# Patient Record
Sex: MALE | Race: WHITE | Employment: FULL TIME | URBAN - METROPOLITAN AREA
[De-identification: names, ages, dates, MRNs, and addresses within clinical notes are randomized per-mention and may not be internally consistent; named-entity substitution may affect disease eponyms.]

---

## 2017-07-14 ENCOUNTER — HOSPITAL ENCOUNTER (EMERGENCY)
Age: 37
Discharge: HOME | End: 2017-07-14
Attending: EMERGENCY MEDICINE

## 2017-07-14 ENCOUNTER — APPOINTMENT (OUTPATIENT)
Dept: CT IMAGING | Age: 37
End: 2017-07-14

## 2017-07-14 VITALS
HEIGHT: 74 IN | SYSTOLIC BLOOD PRESSURE: 115 MMHG | HEART RATE: 67 BPM | DIASTOLIC BLOOD PRESSURE: 66 MMHG | TEMPERATURE: 97 F | OXYGEN SATURATION: 99 % | WEIGHT: 165 LBS | RESPIRATION RATE: 16 BRPM | BODY MASS INDEX: 21.17 KG/M2

## 2017-07-14 DIAGNOSIS — H72.92 RUPTURED TYMPANIC MEMBRANE, LEFT: Primary | ICD-10-CM

## 2017-07-14 PROCEDURE — 99284 EMERGENCY DEPT VISIT MOD MDM: CPT

## 2017-07-14 PROCEDURE — 6370000000 HC RX 637 (ALT 250 FOR IP): Performed by: EMERGENCY MEDICINE

## 2017-07-14 PROCEDURE — 70480 CT ORBIT/EAR/FOSSA W/O DYE: CPT

## 2017-07-14 RX ORDER — CIPROFLOXACIN AND DEXAMETHASONE 3; 1 MG/ML; MG/ML
4 SUSPENSION/ DROPS AURICULAR (OTIC) 2 TIMES DAILY
Status: DISCONTINUED | OUTPATIENT
Start: 2017-07-14 | End: 2017-07-14 | Stop reason: HOSPADM

## 2017-07-14 RX ORDER — CIPROFLOXACIN AND DEXAMETHASONE 3; 1 MG/ML; MG/ML
4 SUSPENSION/ DROPS AURICULAR (OTIC) 2 TIMES DAILY
Qty: 7.5 ML | Refills: 0 | Status: SHIPPED | OUTPATIENT
Start: 2017-07-14 | End: 2017-07-21

## 2017-07-14 RX ADMIN — CIPROFLOXACIN AND DEXAMETHASONE 4 DROP: 3; 1 SUSPENSION/ DROPS AURICULAR (OTIC) at 07:36

## 2017-07-14 ASSESSMENT — ENCOUNTER SYMPTOMS
RHINORRHEA: 0
COLOR CHANGE: 0
NAUSEA: 0
ABDOMINAL PAIN: 0
SHORTNESS OF BREATH: 0
TROUBLE SWALLOWING: 0
VOMITING: 0
CHEST TIGHTNESS: 0

## 2017-07-19 ENCOUNTER — OFFICE VISIT (OUTPATIENT)
Dept: OTOLARYNGOLOGY | Age: 37
End: 2017-07-19

## 2017-07-19 VITALS
SYSTOLIC BLOOD PRESSURE: 127 MMHG | HEIGHT: 74 IN | HEART RATE: 73 BPM | WEIGHT: 164.9 LBS | DIASTOLIC BLOOD PRESSURE: 68 MMHG | BODY MASS INDEX: 21.16 KG/M2

## 2017-07-19 DIAGNOSIS — H72.92 TYMPANIC MEMBRANE PERFORATION, LEFT: Primary | ICD-10-CM

## 2017-07-19 PROCEDURE — 99202 OFFICE O/P NEW SF 15 MIN: CPT

## 2017-07-19 PROCEDURE — 99203 OFFICE O/P NEW LOW 30 MIN: CPT | Performed by: OTOLARYNGOLOGY

## 2017-07-24 ENCOUNTER — TELEPHONE (OUTPATIENT)
Dept: FAMILY MEDICINE CLINIC | Age: 37
End: 2017-07-24

## 2017-08-10 ENCOUNTER — HOSPITAL ENCOUNTER (EMERGENCY)
Age: 37
Discharge: HOME | End: 2017-08-10
Attending: EMERGENCY MEDICINE

## 2017-08-10 ENCOUNTER — APPOINTMENT (OUTPATIENT)
Dept: CT IMAGING | Age: 37
End: 2017-08-10

## 2017-08-10 VITALS
RESPIRATION RATE: 16 BRPM | DIASTOLIC BLOOD PRESSURE: 74 MMHG | SYSTOLIC BLOOD PRESSURE: 112 MMHG | TEMPERATURE: 97 F | OXYGEN SATURATION: 100 % | HEART RATE: 75 BPM

## 2017-08-10 DIAGNOSIS — R10.9 FLANK PAIN: Primary | ICD-10-CM

## 2017-08-10 LAB
-: ABNORMAL
ABSOLUTE EOS #: 0.1 K/UL (ref 0–0.4)
ABSOLUTE LYMPH #: 2.6 K/UL (ref 1–4.8)
ABSOLUTE MONO #: 1.1 K/UL (ref 0.1–1.2)
AMORPHOUS: ABNORMAL
ANION GAP SERPL CALCULATED.3IONS-SCNC: 17 MMOL/L (ref 9–17)
BACTERIA: ABNORMAL
BASOPHILS # BLD: 0 %
BASOPHILS ABSOLUTE: 0 K/UL (ref 0–0.2)
BILIRUBIN URINE: NEGATIVE
BUN BLDV-MCNC: 12 MG/DL (ref 6–20)
BUN/CREAT BLD: ABNORMAL (ref 9–20)
CALCIUM SERPL-MCNC: 9.3 MG/DL (ref 8.6–10.4)
CASTS UA: ABNORMAL /LPF (ref 0–8)
CHLORIDE BLD-SCNC: 101 MMOL/L (ref 98–107)
CO2: 27 MMOL/L (ref 20–31)
COLOR: ABNORMAL
CREAT SERPL-MCNC: 0.83 MG/DL (ref 0.7–1.2)
CRYSTALS, UA: ABNORMAL /HPF
DIFFERENTIAL TYPE: ABNORMAL
EOSINOPHILS RELATIVE PERCENT: 1 %
EPITHELIAL CELLS UA: ABNORMAL /HPF (ref 0–5)
GFR AFRICAN AMERICAN: >60 ML/MIN
GFR NON-AFRICAN AMERICAN: >60 ML/MIN
GFR SERPL CREATININE-BSD FRML MDRD: ABNORMAL ML/MIN/{1.73_M2}
GFR SERPL CREATININE-BSD FRML MDRD: ABNORMAL ML/MIN/{1.73_M2}
GLUCOSE BLD-MCNC: 102 MG/DL (ref 70–99)
GLUCOSE URINE: NEGATIVE
HCT VFR BLD CALC: 39.2 % (ref 41–53)
HEMOGLOBIN: 13.1 G/DL (ref 13.5–17.5)
KETONES, URINE: ABNORMAL
LEUKOCYTE ESTERASE, URINE: ABNORMAL
LYMPHOCYTES # BLD: 19 %
MCH RBC QN AUTO: 30 PG (ref 26–34)
MCHC RBC AUTO-ENTMCNC: 33.5 G/DL (ref 31–37)
MCV RBC AUTO: 89.6 FL (ref 80–100)
MONOCYTES # BLD: 8 %
MUCUS: ABNORMAL
NITRITE, URINE: NEGATIVE
OTHER OBSERVATIONS UA: ABNORMAL
PDW BLD-RTO: 13.6 % (ref 12.5–15.4)
PH UA: 5 (ref 5–8)
PLATELET # BLD: 159 K/UL (ref 140–450)
PLATELET ESTIMATE: ABNORMAL
PMV BLD AUTO: 8.9 FL (ref 6–12)
POTASSIUM SERPL-SCNC: 4 MMOL/L (ref 3.7–5.3)
PROTEIN UA: ABNORMAL
RBC # BLD: 4.37 M/UL (ref 4.5–5.9)
RBC # BLD: ABNORMAL 10*6/UL
RBC UA: ABNORMAL /HPF (ref 0–4)
RENAL EPITHELIAL, UA: ABNORMAL /HPF
SEG NEUTROPHILS: 72 %
SEGMENTED NEUTROPHILS ABSOLUTE COUNT: 9.8 K/UL (ref 1.8–7.7)
SODIUM BLD-SCNC: 145 MMOL/L (ref 135–144)
SPECIFIC GRAVITY UA: 1.04 (ref 1–1.03)
TRICHOMONAS: ABNORMAL
TURBIDITY: CLEAR
URINE HGB: ABNORMAL
UROBILINOGEN, URINE: NORMAL
WBC # BLD: 13.6 K/UL (ref 3.5–11)
WBC # BLD: ABNORMAL 10*3/UL
WBC UA: ABNORMAL /HPF (ref 0–5)
YEAST: ABNORMAL

## 2017-08-10 PROCEDURE — 81001 URINALYSIS AUTO W/SCOPE: CPT

## 2017-08-10 PROCEDURE — 6360000002 HC RX W HCPCS: Performed by: EMERGENCY MEDICINE

## 2017-08-10 PROCEDURE — 96372 THER/PROPH/DIAG INJ SC/IM: CPT

## 2017-08-10 PROCEDURE — 85025 COMPLETE CBC W/AUTO DIFF WBC: CPT

## 2017-08-10 PROCEDURE — 96374 THER/PROPH/DIAG INJ IV PUSH: CPT

## 2017-08-10 PROCEDURE — 87591 N.GONORRHOEAE DNA AMP PROB: CPT

## 2017-08-10 PROCEDURE — 87491 CHLMYD TRACH DNA AMP PROBE: CPT

## 2017-08-10 PROCEDURE — 6370000000 HC RX 637 (ALT 250 FOR IP): Performed by: EMERGENCY MEDICINE

## 2017-08-10 PROCEDURE — 74176 CT ABD & PELVIS W/O CONTRAST: CPT

## 2017-08-10 PROCEDURE — 80048 BASIC METABOLIC PNL TOTAL CA: CPT

## 2017-08-10 PROCEDURE — G0383 LEV 4 HOSP TYPE B ED VISIT: HCPCS

## 2017-08-10 RX ORDER — IBUPROFEN 600 MG/1
600 TABLET ORAL EVERY 6 HOURS PRN
Qty: 30 TABLET | Refills: 0 | Status: SHIPPED | OUTPATIENT
Start: 2017-08-10 | End: 2018-08-19

## 2017-08-10 RX ORDER — HYDROCODONE BITARTRATE AND ACETAMINOPHEN 5; 325 MG/1; MG/1
1 TABLET ORAL EVERY 6 HOURS PRN
Qty: 10 TABLET | Refills: 0 | Status: SHIPPED | OUTPATIENT
Start: 2017-08-10 | End: 2017-08-17

## 2017-08-10 RX ORDER — AZITHROMYCIN 250 MG/1
1000 TABLET, FILM COATED ORAL ONCE
Status: COMPLETED | OUTPATIENT
Start: 2017-08-10 | End: 2017-08-10

## 2017-08-10 RX ORDER — KETOROLAC TROMETHAMINE 30 MG/ML
30 INJECTION, SOLUTION INTRAMUSCULAR; INTRAVENOUS ONCE
Status: COMPLETED | OUTPATIENT
Start: 2017-08-10 | End: 2017-08-10

## 2017-08-10 RX ORDER — CEPHALEXIN 250 MG/1
500 CAPSULE ORAL ONCE
Status: COMPLETED | OUTPATIENT
Start: 2017-08-10 | End: 2017-08-10

## 2017-08-10 RX ORDER — CEPHALEXIN 500 MG/1
500 CAPSULE ORAL 4 TIMES DAILY
Qty: 40 CAPSULE | Refills: 0 | Status: SHIPPED | OUTPATIENT
Start: 2017-08-10 | End: 2017-08-20

## 2017-08-10 RX ORDER — CEFTRIAXONE SODIUM 250 MG/1
250 INJECTION, POWDER, FOR SOLUTION INTRAMUSCULAR; INTRAVENOUS ONCE
Status: COMPLETED | OUTPATIENT
Start: 2017-08-10 | End: 2017-08-10

## 2017-08-10 RX ORDER — KETOROLAC TROMETHAMINE 30 MG/ML
60 INJECTION, SOLUTION INTRAMUSCULAR; INTRAVENOUS ONCE
Status: DISCONTINUED | OUTPATIENT
Start: 2017-08-10 | End: 2017-08-10

## 2017-08-10 RX ADMIN — KETOROLAC TROMETHAMINE 30 MG: 30 INJECTION, SOLUTION INTRAMUSCULAR at 20:35

## 2017-08-10 RX ADMIN — CEPHALEXIN 500 MG: 250 CAPSULE ORAL at 20:52

## 2017-08-10 RX ADMIN — CEFTRIAXONE SODIUM 250 MG: 250 INJECTION, POWDER, FOR SOLUTION INTRAMUSCULAR; INTRAVENOUS at 20:52

## 2017-08-10 RX ADMIN — AZITHROMYCIN 1000 MG: 250 TABLET, FILM COATED ORAL at 20:51

## 2017-08-10 ASSESSMENT — ENCOUNTER SYMPTOMS
NAUSEA: 0
VOMITING: 0
BACK PAIN: 0
SHORTNESS OF BREATH: 0
COUGH: 0
WHEEZING: 0
ABDOMINAL PAIN: 0
ORTHOPNEA: 0
DIARRHEA: 0

## 2017-08-10 ASSESSMENT — PAIN DESCRIPTION - PAIN TYPE: TYPE: ACUTE PAIN

## 2017-08-10 ASSESSMENT — PAIN SCALES - GENERAL: PAINLEVEL_OUTOF10: 2

## 2017-08-10 ASSESSMENT — PAIN SCALES - WONG BAKER: WONGBAKER_NUMERICALRESPONSE: 0

## 2017-08-10 ASSESSMENT — PAIN DESCRIPTION - LOCATION: LOCATION: PENIS

## 2017-08-11 LAB
C. TRACHOMATIS DNA ,URINE: NEGATIVE
N. GONORRHOEAE DNA, URINE: NEGATIVE

## 2018-04-15 ENCOUNTER — APPOINTMENT (OUTPATIENT)
Dept: GENERAL RADIOLOGY | Age: 38
End: 2018-04-15

## 2018-04-15 ENCOUNTER — HOSPITAL ENCOUNTER (EMERGENCY)
Age: 38
Discharge: HOME | End: 2018-04-15
Attending: EMERGENCY MEDICINE

## 2018-04-15 VITALS
OXYGEN SATURATION: 98 % | RESPIRATION RATE: 19 BRPM | TEMPERATURE: 98.1 F | SYSTOLIC BLOOD PRESSURE: 121 MMHG | HEART RATE: 89 BPM | BODY MASS INDEX: 22.46 KG/M2 | WEIGHT: 175 LBS | DIASTOLIC BLOOD PRESSURE: 68 MMHG | HEIGHT: 74 IN

## 2018-04-15 DIAGNOSIS — S01.81XA CHIN LACERATION, INITIAL ENCOUNTER: Primary | ICD-10-CM
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
## 2018-04-15 PROCEDURE — 12013 RPR F/E/E/N/L/M 2.6-5.0 CM: CPT

## 2018-04-15 PROCEDURE — 99283 EMERGENCY DEPT VISIT LOW MDM: CPT

## 2018-04-15 PROCEDURE — 72072 X-RAY EXAM THORAC SPINE 3VWS: CPT

## 2018-04-15 PROCEDURE — 73562 X-RAY EXAM OF KNEE 3: CPT

## 2018-04-15 ASSESSMENT — PAIN DESCRIPTION - ONSET: ONSET: SUDDEN

## 2018-04-15 ASSESSMENT — PAIN DESCRIPTION - FREQUENCY: FREQUENCY: CONTINUOUS

## 2018-04-15 ASSESSMENT — PAIN DESCRIPTION - ORIENTATION: ORIENTATION: LOWER

## 2018-04-15 ASSESSMENT — PAIN DESCRIPTION - DESCRIPTORS: DESCRIPTORS: ACHING

## 2018-04-15 ASSESSMENT — PAIN DESCRIPTION - PAIN TYPE: TYPE: ACUTE PAIN

## 2018-04-15 ASSESSMENT — PAIN SCALES - GENERAL: PAINLEVEL_OUTOF10: 2

## 2018-04-24 ENCOUNTER — HOSPITAL ENCOUNTER (EMERGENCY)
Age: 38
Discharge: HOME | End: 2018-04-24
Attending: EMERGENCY MEDICINE

## 2018-04-24 VITALS
WEIGHT: 164 LBS | SYSTOLIC BLOOD PRESSURE: 107 MMHG | HEART RATE: 56 BPM | HEIGHT: 74 IN | RESPIRATION RATE: 17 BRPM | OXYGEN SATURATION: 100 % | TEMPERATURE: 98.1 F | DIASTOLIC BLOOD PRESSURE: 65 MMHG | BODY MASS INDEX: 21.05 KG/M2

## 2018-04-24 DIAGNOSIS — Z48.02 VISIT FOR SUTURE REMOVAL: Primary | ICD-10-CM

## 2018-04-24 PROCEDURE — 99281 EMR DPT VST MAYX REQ PHY/QHP: CPT

## 2018-04-24 ASSESSMENT — ENCOUNTER SYMPTOMS
ABDOMINAL PAIN: 0
NAUSEA: 0
EYE DISCHARGE: 0
CHEST TIGHTNESS: 0
RHINORRHEA: 0
VOMITING: 0
SHORTNESS OF BREATH: 0
DIARRHEA: 0
EYE REDNESS: 0
BLOOD IN STOOL: 0
SORE THROAT: 0
COUGH: 0

## 2018-08-19 ENCOUNTER — HOSPITAL ENCOUNTER (EMERGENCY)
Age: 38
Discharge: HOME | End: 2018-08-19
Attending: EMERGENCY MEDICINE

## 2018-08-19 VITALS
TEMPERATURE: 98.4 F | RESPIRATION RATE: 18 BRPM | SYSTOLIC BLOOD PRESSURE: 123 MMHG | HEART RATE: 77 BPM | DIASTOLIC BLOOD PRESSURE: 76 MMHG | OXYGEN SATURATION: 100 %

## 2018-08-19 DIAGNOSIS — S46.011A STRAIN OF RIGHT ROTATOR CUFF CAPSULE, INITIAL ENCOUNTER: Primary | ICD-10-CM

## 2018-08-19 PROCEDURE — 96374 THER/PROPH/DIAG INJ IV PUSH: CPT

## 2018-08-19 PROCEDURE — 6360000002 HC RX W HCPCS: Performed by: EMERGENCY MEDICINE

## 2018-08-19 PROCEDURE — 99283 EMERGENCY DEPT VISIT LOW MDM: CPT

## 2018-08-19 RX ORDER — IBUPROFEN 200 MG
400 TABLET ORAL EVERY 8 HOURS PRN
Qty: 30 TABLET | Refills: 0 | Status: SHIPPED | OUTPATIENT
Start: 2018-08-19 | End: 2018-08-24

## 2018-08-19 RX ORDER — KETOROLAC TROMETHAMINE 30 MG/ML
30 INJECTION, SOLUTION INTRAMUSCULAR; INTRAVENOUS ONCE
Status: COMPLETED | OUTPATIENT
Start: 2018-08-19 | End: 2018-08-19

## 2018-08-19 RX ORDER — ACETAMINOPHEN 325 MG/1
650 TABLET ORAL EVERY 6 HOURS PRN
Qty: 30 TABLET | Refills: 3 | Status: SHIPPED | OUTPATIENT
Start: 2018-08-19 | End: 2018-08-24

## 2018-08-19 RX ADMIN — KETOROLAC TROMETHAMINE 30 MG: 30 INJECTION, SOLUTION INTRAMUSCULAR at 22:33

## 2018-08-19 ASSESSMENT — PAIN SCALES - GENERAL: PAINLEVEL_OUTOF10: 9

## 2018-08-19 ASSESSMENT — ENCOUNTER SYMPTOMS
EYE PAIN: 0
SORE THROAT: 0
COUGH: 0
VOMITING: 0
ABDOMINAL PAIN: 0
EYE DISCHARGE: 0
SHORTNESS OF BREATH: 0
DIARRHEA: 0
NAUSEA: 0

## 2018-08-20 NOTE — ED NOTES
Sw asked to see patient who had questions regarding insurance. Patient has pending medicaid. Sw gave patient HELP # to call and check on pending #. Patient was also in need of a PCP for follow up, SW gave patient resources to call and schedule @ [facility].

## 2018-08-20 NOTE — ED PROVIDER NOTES
distress. He has no wheezes. He has no rales. Abdominal: Soft. Bowel sounds are normal. There is no tenderness. There is no rebound and no guarding. Musculoskeletal: He exhibits no edema or deformity. No erythema or swelling to the shoulder, no bony tenderness to the shoulder, full active and passive range of motion although patient notes pain with abduction above 90°  Strong radial pulses bilaterally, 5/5 strength and sensation intact to bilateral upper extremities, cap refill less than 2 seconds to bilateral fingers  No midline tenderness or deformity to thoracic or lumbar spine   Neurological: He is alert and oriented to person, place, and time. He exhibits normal muscle tone. Skin: Skin is warm and dry. No rash noted. Psychiatric: He has a normal mood and affect. Thought content normal.   Nursing note and vitals reviewed. DIFFERENTIAL  DIAGNOSIS     PLAN (LABS / IMAGING / EKG):  No orders of the defined types were placed in this encounter. MEDICATIONS ORDERED:  Orders Placed This Encounter   Medications    ketorolac (TORADOL) injection 30 mg    ibuprofen (ADVIL;MOTRIN) 200 MG tablet     Sig: Take 2 tablets by mouth every 8 hours as needed for Pain or Fever     Dispense:  30 tablet     Refill:  0    acetaminophen (TYLENOL) 325 MG tablet     Sig: Take 2 tablets by mouth every 6 hours as needed for Pain     Dispense:  30 tablet     Refill:  3       DDX: Rotator cuff strain, contusion, bursitis, arthritis    DIAGNOSTIC RESULTS / [address] [place] [address] / [facility]     LABS:  No results found for this visit on 08/19/18. IMPRESSION: 52-year-old male complaining of right-sided shoulder pain, nontraumatic, ×1 month, no evidence of any infection, full active and passive range of motion, doubt septic arthritis, increased pain with over 90° of abduction of the right shoulder, consistent with rotator cuff strain.   Discussed need for patient to follow up with primary care provider for potential

## 2022-10-01 ENCOUNTER — APPOINTMENT (OUTPATIENT)
Dept: GENERAL RADIOLOGY | Age: 42
DRG: 308 | End: 2022-10-01

## 2022-10-01 ENCOUNTER — HOSPITAL ENCOUNTER (INPATIENT)
Age: 42
LOS: 3 days | Discharge: HOME | DRG: 308 | End: 2022-10-04
Attending: EMERGENCY MEDICINE | Admitting: INTERNAL MEDICINE

## 2022-10-01 DIAGNOSIS — U07.1 COVID-19: ICD-10-CM

## 2022-10-01 DIAGNOSIS — R00.1 BRADYCARDIA: Primary | ICD-10-CM

## 2022-10-01 PROBLEM — J45.909 ASTHMA: Status: RESOLVED | Noted: 2022-10-01 | Resolved: 2022-10-01

## 2022-10-01 PROBLEM — J45.909 ASTHMA: Status: ACTIVE | Noted: 2022-10-01

## 2022-10-01 PROBLEM — J93.83 SPONTANEOUS PNEUMOTHORAX: Status: ACTIVE | Noted: 2019-11-11

## 2022-10-01 PROBLEM — F17.210 NICOTINE DEPENDENCE, CIGARETTES, UNCOMPLICATED: Status: ACTIVE | Noted: 2019-11-12

## 2022-10-01 LAB
ABSOLUTE EOS #: <0.03 K/UL (ref 0–0.44)
ABSOLUTE IMMATURE GRANULOCYTE: <0.03 K/UL (ref 0–0.3)
ABSOLUTE LYMPH #: 1.96 K/UL (ref 1.1–3.7)
ABSOLUTE MONO #: 1.4 K/UL (ref 0.1–1.2)
ANION GAP SERPL CALCULATED.3IONS-SCNC: 11 MMOL/L (ref 9–17)
BASOPHILS # BLD: 0 % (ref 0–2)
BASOPHILS ABSOLUTE: 0.03 K/UL (ref 0–0.2)
BUN BLDV-MCNC: 13 MG/DL (ref 6–20)
CALCIUM SERPL-MCNC: 8.7 MG/DL (ref 8.6–10.4)
CHLORIDE BLD-SCNC: 98 MMOL/L (ref 98–107)
CO2: 25 MMOL/L (ref 20–31)
CREAT SERPL-MCNC: 0.92 MG/DL (ref 0.7–1.2)
EOSINOPHILS RELATIVE PERCENT: 0 % (ref 1–4)
FLU A ANTIGEN: NEGATIVE
FLU B ANTIGEN: NEGATIVE
GFR AFRICAN AMERICAN: >60 ML/MIN
GFR NON-AFRICAN AMERICAN: >60 ML/MIN
GFR SERPL CREATININE-BSD FRML MDRD: ABNORMAL ML/MIN/{1.73_M2}
GLUCOSE BLD-MCNC: 112 MG/DL (ref 70–99)
HCT VFR BLD CALC: 38.8 % (ref 40.7–50.3)
HEMOGLOBIN: 13.3 G/DL (ref 13–17)
IMMATURE GRANULOCYTES: 0 %
LACTIC ACID, WHOLE BLOOD: 1.3 MMOL/L (ref 0.7–2.1)
LIPASE: 30 U/L (ref 13–60)
LYMPHOCYTES # BLD: 24 % (ref 24–43)
MAGNESIUM: 2.1 MG/DL (ref 1.6–2.6)
MCH RBC QN AUTO: 30.8 PG (ref 25.2–33.5)
MCHC RBC AUTO-ENTMCNC: 34.3 G/DL (ref 28.4–34.8)
MCV RBC AUTO: 89.8 FL (ref 82.6–102.9)
MONOCYTES # BLD: 17 % (ref 3–12)
NRBC AUTOMATED: 0 PER 100 WBC
PDW BLD-RTO: 13.2 % (ref 11.8–14.4)
PLATELET # BLD: 149 K/UL (ref 138–453)
PMV BLD AUTO: 10.9 FL (ref 8.1–13.5)
POTASSIUM SERPL-SCNC: 3.6 MMOL/L (ref 3.7–5.3)
RBC # BLD: 4.32 M/UL (ref 4.21–5.77)
SARS-COV-2, RAPID: DETECTED
SEG NEUTROPHILS: 59 % (ref 36–65)
SEGMENTED NEUTROPHILS ABSOLUTE COUNT: 4.94 K/UL (ref 1.5–8.1)
SODIUM BLD-SCNC: 134 MMOL/L (ref 135–144)
SPECIMEN DESCRIPTION: ABNORMAL
TOTAL CK: 105 U/L (ref 39–308)
TROPONIN, HIGH SENSITIVITY: 7 NG/L (ref 0–22)
TROPONIN, HIGH SENSITIVITY: <6 NG/L (ref 0–22)
WBC # BLD: 8.3 K/UL (ref 3.5–11.3)

## 2022-10-01 PROCEDURE — 83605 ASSAY OF LACTIC ACID: CPT

## 2022-10-01 PROCEDURE — 87804 INFLUENZA ASSAY W/OPTIC: CPT

## 2022-10-01 PROCEDURE — 71045 X-RAY EXAM CHEST 1 VIEW: CPT

## 2022-10-01 PROCEDURE — 83690 ASSAY OF LIPASE: CPT

## 2022-10-01 PROCEDURE — 93005 ELECTROCARDIOGRAM TRACING: CPT | Performed by: STUDENT IN AN ORGANIZED HEALTH CARE EDUCATION/TRAINING PROGRAM

## 2022-10-01 PROCEDURE — 87635 SARS-COV-2 COVID-19 AMP PRB: CPT

## 2022-10-01 PROCEDURE — 2060000000 HC ICU INTERMEDIATE R&B

## 2022-10-01 PROCEDURE — 83735 ASSAY OF MAGNESIUM: CPT

## 2022-10-01 PROCEDURE — 2580000003 HC RX 258: Performed by: STUDENT IN AN ORGANIZED HEALTH CARE EDUCATION/TRAINING PROGRAM

## 2022-10-01 PROCEDURE — 99285 EMERGENCY DEPT VISIT HI MDM: CPT

## 2022-10-01 PROCEDURE — 36415 COLL VENOUS BLD VENIPUNCTURE: CPT

## 2022-10-01 PROCEDURE — 96372 THER/PROPH/DIAG INJ SC/IM: CPT

## 2022-10-01 PROCEDURE — 96374 THER/PROPH/DIAG INJ IV PUSH: CPT

## 2022-10-01 PROCEDURE — 85025 COMPLETE CBC W/AUTO DIFF WBC: CPT

## 2022-10-01 PROCEDURE — 84484 ASSAY OF TROPONIN QUANT: CPT

## 2022-10-01 PROCEDURE — 2580000003 HC RX 258: Performed by: CLINICAL NURSE SPECIALIST

## 2022-10-01 PROCEDURE — 6370000000 HC RX 637 (ALT 250 FOR IP): Performed by: CLINICAL NURSE SPECIALIST

## 2022-10-01 PROCEDURE — 6360000002 HC RX W HCPCS: Performed by: STUDENT IN AN ORGANIZED HEALTH CARE EDUCATION/TRAINING PROGRAM

## 2022-10-01 PROCEDURE — 80048 BASIC METABOLIC PNL TOTAL CA: CPT

## 2022-10-01 PROCEDURE — 82550 ASSAY OF CK (CPK): CPT

## 2022-10-01 PROCEDURE — 6360000002 HC RX W HCPCS: Performed by: CLINICAL NURSE SPECIALIST

## 2022-10-01 RX ORDER — DICYCLOMINE HYDROCHLORIDE 10 MG/ML
20 INJECTION INTRAMUSCULAR ONCE
Status: COMPLETED | OUTPATIENT
Start: 2022-10-01 | End: 2022-10-01

## 2022-10-01 RX ORDER — SODIUM CHLORIDE 9 MG/ML
INJECTION, SOLUTION INTRAVENOUS CONTINUOUS
Status: DISCONTINUED | OUTPATIENT
Start: 2022-10-01 | End: 2022-10-03

## 2022-10-01 RX ORDER — GUAIFENESIN DEXTROMETHORPHAN HYDROBROMIDE ORAL SOLUTION 10; 100 MG/5ML; MG/5ML
5 SOLUTION ORAL EVERY 4 HOURS PRN
Status: DISCONTINUED | OUTPATIENT
Start: 2022-10-01 | End: 2022-10-04 | Stop reason: HOSPADM

## 2022-10-01 RX ORDER — ACETAMINOPHEN 325 MG/1
650 TABLET ORAL EVERY 6 HOURS PRN
Status: DISCONTINUED | OUTPATIENT
Start: 2022-10-01 | End: 2022-10-04 | Stop reason: HOSPADM

## 2022-10-01 RX ORDER — ONDANSETRON 2 MG/ML
4 INJECTION INTRAMUSCULAR; INTRAVENOUS ONCE
Status: COMPLETED | OUTPATIENT
Start: 2022-10-01 | End: 2022-10-01

## 2022-10-01 RX ORDER — ONDANSETRON 4 MG/1
4 TABLET, ORALLY DISINTEGRATING ORAL EVERY 8 HOURS PRN
Status: DISCONTINUED | OUTPATIENT
Start: 2022-10-01 | End: 2022-10-04 | Stop reason: HOSPADM

## 2022-10-01 RX ORDER — 0.9 % SODIUM CHLORIDE 0.9 %
1000 INTRAVENOUS SOLUTION INTRAVENOUS ONCE
Status: COMPLETED | OUTPATIENT
Start: 2022-10-01 | End: 2022-10-01

## 2022-10-01 RX ORDER — GUAIFENESIN/DEXTROMETHORPHAN 100-10MG/5
5 SYRUP ORAL EVERY 4 HOURS PRN
Status: DISCONTINUED | OUTPATIENT
Start: 2022-10-01 | End: 2022-10-01 | Stop reason: CLARIF

## 2022-10-01 RX ORDER — SODIUM CHLORIDE 0.9 % (FLUSH) 0.9 %
5-40 SYRINGE (ML) INJECTION PRN
Status: DISCONTINUED | OUTPATIENT
Start: 2022-10-01 | End: 2022-10-04 | Stop reason: HOSPADM

## 2022-10-01 RX ORDER — ENOXAPARIN SODIUM 100 MG/ML
30 INJECTION SUBCUTANEOUS 2 TIMES DAILY
Status: DISCONTINUED | OUTPATIENT
Start: 2022-10-01 | End: 2022-10-04 | Stop reason: HOSPADM

## 2022-10-01 RX ORDER — SODIUM CHLORIDE 9 MG/ML
25 INJECTION, SOLUTION INTRAVENOUS PRN
Status: DISCONTINUED | OUTPATIENT
Start: 2022-10-01 | End: 2022-10-04 | Stop reason: HOSPADM

## 2022-10-01 RX ORDER — ONDANSETRON 2 MG/ML
4 INJECTION INTRAMUSCULAR; INTRAVENOUS EVERY 6 HOURS PRN
Status: DISCONTINUED | OUTPATIENT
Start: 2022-10-01 | End: 2022-10-04 | Stop reason: HOSPADM

## 2022-10-01 RX ORDER — ACETAMINOPHEN 650 MG/1
650 SUPPOSITORY RECTAL EVERY 6 HOURS PRN
Status: DISCONTINUED | OUTPATIENT
Start: 2022-10-01 | End: 2022-10-04 | Stop reason: HOSPADM

## 2022-10-01 RX ORDER — POLYETHYLENE GLYCOL 3350 17 G/17G
17 POWDER, FOR SOLUTION ORAL DAILY PRN
Status: DISCONTINUED | OUTPATIENT
Start: 2022-10-01 | End: 2022-10-04 | Stop reason: HOSPADM

## 2022-10-01 RX ORDER — SODIUM CHLORIDE 0.9 % (FLUSH) 0.9 %
5-40 SYRINGE (ML) INJECTION EVERY 12 HOURS SCHEDULED
Status: DISCONTINUED | OUTPATIENT
Start: 2022-10-01 | End: 2022-10-04 | Stop reason: HOSPADM

## 2022-10-01 RX ADMIN — DICYCLOMINE HYDROCHLORIDE 20 MG: 10 INJECTION, SOLUTION INTRAMUSCULAR at 10:13

## 2022-10-01 RX ADMIN — SODIUM CHLORIDE 1000 ML: 9 INJECTION, SOLUTION INTRAVENOUS at 10:17

## 2022-10-01 RX ADMIN — ONDANSETRON 4 MG: 2 INJECTION INTRAMUSCULAR; INTRAVENOUS at 10:11

## 2022-10-01 RX ADMIN — ENOXAPARIN SODIUM 30 MG: 100 INJECTION SUBCUTANEOUS at 21:03

## 2022-10-01 RX ADMIN — SODIUM CHLORIDE: 9 INJECTION, SOLUTION INTRAVENOUS at 15:03

## 2022-10-01 RX ADMIN — SODIUM CHLORIDE 1000 ML: 9 INJECTION, SOLUTION INTRAVENOUS at 12:48

## 2022-10-01 RX ADMIN — ACETAMINOPHEN 650 MG: 325 TABLET ORAL at 21:11

## 2022-10-01 ASSESSMENT — ENCOUNTER SYMPTOMS
DIARRHEA: 1
COUGH: 1
TROUBLE SWALLOWING: 0
NAUSEA: 1
APNEA: 0
ABDOMINAL DISTENTION: 0
ABDOMINAL PAIN: 0
SORE THROAT: 0
BACK PAIN: 0
COUGH: 0
COLOR CHANGE: 0
CHEST TIGHTNESS: 0
VOICE CHANGE: 0
SHORTNESS OF BREATH: 0
VOMITING: 1
BLOOD IN STOOL: 0
CONSTIPATION: 0

## 2022-10-01 ASSESSMENT — PAIN DESCRIPTION - LOCATION: LOCATION: HEAD

## 2022-10-01 ASSESSMENT — PAIN SCALES - GENERAL: PAINLEVEL_OUTOF10: 3

## 2022-10-01 ASSESSMENT — PAIN - FUNCTIONAL ASSESSMENT: PAIN_FUNCTIONAL_ASSESSMENT: NONE - DENIES PAIN

## 2022-10-01 NOTE — ED NOTES
HR dropped to 34 and increased to 40s, Dr. Justine Kenyon notified.       Fabby Conner, RN  10/01/22 1000

## 2022-10-01 NOTE — ED NOTES
Pt received warm blankets. Pt denies other needs at this time.      Coletta Osler, RN  10/01/22 1010

## 2022-10-01 NOTE — H&P
[facility]  Office: [address], DO, Christopher Malhotra, DO, Brian Max, DO, Kaitlin Orlandomaxwell Blood, DO, Velton Habermann, MD, Ginette Suggs MD, Colton Reza MD, Pam Urena MD,  Silvina Hamilton MD, Mannie Goodrich MD, Rebeca Falk, DO, Simón Paulino MD,  Rula Osorio MD, Dori Brito MD, Artis Snyder DO, Dara Ken MD, Damaris Baldwin MD, Erika Encarnacion MD, Fransico Mobley MD, Naldo France MD, Kurt Cardona MD, Carrillo Morrison, DO, Trisha Khan MD, [place] MD, Jose Maria Finley, CNP,  Renetta Cross, CNP, Diamond Barragan, CNP, Bertha Syed, CNP,  Nika Nicolas, [facility], Yana Alvarez, CNP, Micah Perez, CNP, Shamar Anne, CNP, Denise Moss, CNP, Austin Rosenbaum, CNP, Josué Royal, PA-C, Sim Elizondo, CNS, Dawna Fink, [facility], Toni Drew, CNP, Flaquito Day, CNP, Alycia Edwards, [facility]    HISTORY AND PHYSICAL EXAMINATION            Date:   10/1/2022  Patient name:  Derik Harris  Date of admission:  10/1/2022  9:42 AM  MRN:   [id number]  Account:  [de-identified]  YOB: 1980  PCP:    No primary care provider on file. Room:   [address] [place] [address]  Code Status:    Full Code    Chief Complaint:     Chief Complaint   Patient presents with    Nausea       History Obtained From:     patient, electronic medical record    History of Present Illness:     Derik Harris is a 40 y.o. male who presents with Nausea  He was admitted Oct 1 through the ED for the management of Bradycardia. Pt tested + Covid, not vaccinated. Hx left PTX due to bleb, Nov '19. During that admit pt was told he was frequently bradycardic but there is no documentation of this in care everywhere. He reports Fever and chills since Sept 29, nausea with 1 episode of vomiting/diarrhea yesterday. Pt has also had gen body aches and  fatigue.   In the ED he was noted to have a sinus bradycardia, at times in the 20's. Pt admits to freq episodes of dizziness. In fact, his co-workers know that if he changes position too fast they should be ready to catch him. He denies chest pain or shortness of breath. Past Medical History:     Past Medical History:   Diagnosis Date    Asthma     Spontaneous pneumothorax     Left        Past Surgical History:     Past Surgical History:   Procedure Laterality Date    HAND SURGERY          Medications Prior to Admission:     Prior to Admission medications    Medication Sig Start Date End Date Taking? Authorizing Provider   ibuprofen (ADVIL;MOTRIN) 200 MG tablet Take 2 tablets by mouth every 8 hours as needed for Pain or Fever 8/19/18 8/24/18  Mao [place], DO   acetaminophen (TYLENOL) 325 MG tablet Take 2 tablets by mouth every 6 hours as needed for Pain 8/19/18 8/24/18  Mao Arndtr,         Allergies:     Patient has no known allergies. Social History:     Tobacco:    reports that he has been smoking cigarettes. He has been smoking an average of 1 pack per day. He does not have any smokeless tobacco history on file. Alcohol:      reports no history of alcohol use. Drug Use:  reports current drug use. Drug: Marijuana Yanidelfino Valdivia). Family History:     History reviewed. No pertinent family history. Review of Systems:     Positive and Negative as described in HPI. Review of Systems   Constitutional:  Positive for chills, fatigue and fever. HENT:  Negative for sore throat and trouble swallowing. Respiratory:  Positive for cough. Negative for chest tightness and shortness of breath. Cardiovascular:  Negative for chest pain, palpitations and leg swelling. Gastrointestinal:  Positive for diarrhea, nausea and vomiting. Negative for abdominal pain and blood in stool. Genitourinary:  Negative for dysuria and hematuria. Musculoskeletal:  Positive for myalgias. Neurological:  Positive for dizziness (with position changes).    All other systems reviewed and are negative. Physical Exam:   BP (!) 101/57   Pulse (!) 34   Temp 98.9 °F (37.2 °C) (Oral)   Resp 12   Ht 6' 2\" (1.88 m)   Wt 180 lb 12.4 oz (82 kg)   SpO2 96%   BMI 23.21 kg/m²   Temp (24hrs), Av.9 °F (37.2 °C), Min:98.9 °F (37.2 °C), Max:98.9 °F (37.2 °C)    No results for input(s): POCGLU in the last 72 hours. No intake or output data in the 24 hours ending 10/01/22 1627    Physical Exam  Vitals and nursing note reviewed. Constitutional:       General: He is not in acute distress. Appearance: He is ill-appearing. HENT:      Head: Normocephalic. Mouth/Throat:      Mouth: Mucous membranes are moist.      Pharynx: No oropharyngeal exudate. Comments: Post pharynx mildly erythematous  Eyes:      General: No scleral icterus. Extraocular Movements: Extraocular movements intact. Pupils: Pupils are equal, round, and reactive to light. Cardiovascular:      Rate and Rhythm: Regular rhythm. Bradycardia present. Pulses: Normal pulses. Heart sounds: No murmur heard. Comments: Sinus mayda 40's  Pulmonary:      Effort: Pulmonary effort is normal.      Breath sounds: Normal breath sounds. Abdominal:      General: Abdomen is flat. Palpations: Abdomen is soft. Tenderness: There is no abdominal tenderness. Musculoskeletal:         General: No swelling or tenderness. Skin:     General: Skin is warm and dry. Coloration: Skin is not jaundiced. Neurological:      General: No focal deficit present. Mental Status: He is alert and oriented to person, place, and time. Cranial Nerves: No cranial nerve deficit.    Psychiatric:         Behavior: Behavior normal.       Investigations:      Laboratory Testing:  Recent Results (from the past 24 hour(s))   CBC with Auto Differential    Collection Time: 10/01/22 10:07 AM   Result Value Ref Range    WBC 8.3 3.5 - 11.3 k/uL    RBC 4.32 4.21 - 5.77 m/uL    Hemoglobin 13.3 13.0 - 17.0 g/dL    Hematocrit 38.8 (L) 40.7 - 50.3 %    MCV 89.8 82.6 - 102.9 fL    MCH 30.8 25.2 - 33.5 pg    MCHC 34.3 28.4 - 34.8 g/dL    RDW 13.2 11.8 - 14.4 %    Platelets 125 563 - 800 k/uL    MPV 10.9 8.1 - 13.5 fL    NRBC Automated 0.0 0.0 per 100 WBC    Seg Neutrophils 59 36 - 65 %    Lymphocytes 24 24 - 43 %    Monocytes 17 (H) 3 - 12 %    Eosinophils % 0 (L) 1 - 4 %    Basophils 0 0 - 2 %    Immature Granulocytes 0 0 %    Segs Absolute 4.94 1.50 - 8.10 k/uL    Absolute Lymph # 1.96 1.10 - 3.70 k/uL    Absolute Mono # 1.40 (H) 0.10 - 1.20 k/uL    Absolute Eos # <0.03 0.00 - 0.44 k/uL    Basophils Absolute 0.03 0.00 - 0.20 k/uL    Absolute Immature Granulocyte <0.03 0.00 - 0.30 k/uL   BMP    Collection Time: 10/01/22 10:07 AM   Result Value Ref Range    Glucose 112 (H) 70 - 99 mg/dL    BUN 13 6 - 20 mg/dL    Creatinine 0.92 0.70 - 1.20 mg/dL    Calcium 8.7 8.6 - 10.4 mg/dL    Sodium 134 (L) 135 - 144 mmol/L    Potassium 3.6 (L) 3.7 - 5.3 mmol/L    Chloride 98 98 - 107 mmol/L    CO2 25 20 - 31 mmol/L    Anion Gap 11 9 - 17 mmol/L    GFR Non-African American >60 >60 mL/min    GFR African American >60 >60 mL/min    GFR Comment         COVID-19, Rapid    Collection Time: 10/01/22 10:07 AM    Specimen: Nasopharyngeal Swab   Result Value Ref Range    Specimen Description . NASOPHARYNGEAL SWAB     SARS-CoV-2, Rapid DETECTED (A) Not Detected   Lactic Acid    Collection Time: 10/01/22 10:07 AM   Result Value Ref Range    Lactic Acid, Whole Blood 1.3 0.7 - 2.1 mmol/L   Lipase    Collection Time: 10/01/22 10:07 AM   Result Value Ref Range    Lipase 30 13 - 60 U/L   Magnesium    Collection Time: 10/01/22 10:07 AM   Result Value Ref Range    Magnesium 2.1 1.6 - 2.6 mg/dL   Troponin    Collection Time: 10/01/22 10:07 AM   Result Value Ref Range    Troponin, High Sensitivity <6 0 - 22 ng/L   CK    Collection Time: 10/01/22 10:07 AM   Result Value Ref Range    Total  39 - 308 U/L   RAPID INFLUENZA A/B ANTIGENS    Collection Time: 10/01/22 10:21 AM Specimen: Nasopharyngeal   Result Value Ref Range    Flu A Antigen NEGATIVE NEGATIVE    Flu B Antigen NEGATIVE NEGATIVE       Imaging/Diagnostics:    XR CHEST PORTABLE  Result Date: 10/1/2022  No acute abnormality. Assessment :      Hospital Problems             Last Modified POA    * (Principal) Bradycardia 10/1/2022 Yes    COVID 10/1/2022 Yes    Nicotine dependence, cigarettes, uncomplicated 98/1/8506 Yes       Plan:     Patient status inpatient in the Progressive Unit/Step down    Bradycardia, freq episodes of dizziness  -Consult Cardiology    Covid  -Monitor    Nicotine dependence  -Encouraged to consider quitting, hasn't smoked in 2 days    [facility]   [facility]    Patient is admitted as inpatient status because of co-morbidities listed above, severity of signs and symptoms as outlined, requirement for current medical therapies and most importantly because of direct risk to patient if care not provided in a hospital setting. Expected length of stay > 48 hours. ALBERT Briceño - CNS  10/1/2022  4:27 PM    Copy sent to Dr. Frank Beltran primary care provider on file.

## 2022-10-01 NOTE — ED PROVIDER NOTES
[facility]  ED  Emergency Department Encounter  Emergency Medicine Resident     Pt Name: Mariah Christianson  FSO:[id number]  [place] 1980  Date of evaluation: 10/1/22  PCP:  No primary care provider on file. CHIEF COMPLAINT       Chief Complaint   Patient presents with    Nausea       HISTORY OF PRESENT ILLNESS  (Location/Symptom, Timing/Onset, Context/Setting, Quality, Duration, ModifyingFactors, Severity.)      Mariah Christianson is a 40 y.o. male presents to the emergency room for evaluation secondary to having several days of nausea with emesis and a single episode of diarrhea. Patient states that he has been feeling off and just not himself. Patient works as a scrap yard middle worker, states that is confounding of severe fatigue. Endorses that he is been having hot and cold chills at home as well as change in his taste and appetite. Patient Floydene Prude is that he does not like needles, has not been vaccinated for the flu or COVID-19. Patient denies change in his mental status or recent viral illnesses. PAST MEDICAL / SURGICAL / SOCIAL /FAMILY HISTORY      has a past medical history of Asthma. No other pertinent PMH on review with patient/guardian. has a past surgical history that includes Hand surgery. No other pertinent PSH on review with patient/guardian.   Social History     Socioeconomic History    Marital status: Single     Spouse name: Not on file    Number of children: Not on file    Years of education: Not on file    Highest education level: Not on file   Occupational History    Not on file   Tobacco Use    Smoking status: Every Day     Packs/day: 1.00     Types: Cigarettes    Smokeless tobacco: Not on file   Substance and Sexual Activity    Alcohol use: No    Drug use: Yes     Types: Marijuana Deadra Steven)    Sexual activity: Not on file   Other Topics Concern    Not on file   Social History Narrative    Not on file     Social Determinants of Health     Financial Resource Strain: Not on file   Food Insecurity: Not on file   Transportation Needs: Not on file   Physical Activity: Not on file   Stress: Not on file   Social Connections: Not on file   Intimate Partner Violence: Not on file   Housing Stability: Not on file       I counseled the patient against using tobacco products. History reviewed. No pertinent family history. No other pertinent FamHx on review with patient/guardian. Allergies:  Patient has no known allergies. Home Medications:  Prior to Admission medications    Medication Sig Start Date End Date Taking? Authorizing Provider   ibuprofen (ADVIL;MOTRIN) 200 MG tablet Take 2 tablets by mouth every 8 hours as needed for Pain or Fever 8/19/18 8/24/18  Yves Linton,    acetaminophen (TYLENOL) 325 MG tablet Take 2 tablets by mouth every 6 hours as needed for Pain 8/19/18 8/24/18  Yonatan Long, DO       REVIEW OF SYSTEMS    (2-9 systems for level 4, 10 ormore for level 5)      Review of Systems   Constitutional:  Positive for activity change, appetite change, chills, fatigue and fever. HENT:  Negative for congestion, trouble swallowing and voice change. Respiratory:  Negative for apnea, cough and shortness of breath. Cardiovascular:  Negative for chest pain. Gastrointestinal:  Positive for diarrhea, nausea and vomiting. Negative for abdominal distention, abdominal pain, blood in stool and constipation. Endocrine: Positive for cold intolerance. Negative for heat intolerance. Genitourinary:  Negative for dysuria and frequency. Musculoskeletal:  Negative for arthralgias and back pain. Skin:  Negative for color change, pallor, rash and wound. Allergic/Immunologic: Negative for immunocompromised state. Neurological:  Negative for dizziness, light-headedness and headaches. Psychiatric/Behavioral:  Negative for agitation, behavioral problems and confusion.       PHYSICAL EXAM   (up to 7 for level 4, 8 or more for level 5)      INITIAL VITALS:   BP (!) 99/55   Pulse 54   Temp 98.9 °F (37.2 °C) (Oral)   Resp 18   Ht 6' 2\" (1.88 m)   Wt 175 lb (79.4 kg)   SpO2 95%   BMI 22.47 kg/m²     Physical Exam  Vitals reviewed. Constitutional:       Appearance: Normal appearance. He is obese. He is not ill-appearing. HENT:      Head: Normocephalic and atraumatic. Nose: Nose normal.      Mouth/Throat:      Mouth: Mucous membranes are moist.      Pharynx: Oropharynx is clear. Eyes:      Extraocular Movements: Extraocular movements intact. Conjunctiva/sclera: Conjunctivae normal.      Pupils: Pupils are equal, round, and reactive to light. Cardiovascular:      Rate and Rhythm: Regular rhythm. Bradycardia present. Pulses: Normal pulses. Heart sounds: Normal heart sounds. Pulmonary:      Effort: Pulmonary effort is normal.      Breath sounds: Normal breath sounds. Abdominal:      General: Abdomen is flat. There is no distension. Palpations: Abdomen is soft. Tenderness: There is no abdominal tenderness. Musculoskeletal:         General: Normal range of motion. Cervical back: Normal range of motion and neck supple. Skin:     General: Skin is warm and dry. Capillary Refill: Capillary refill takes less than 2 seconds. Neurological:      General: No focal deficit present. Mental Status: He is alert. Mental status is at baseline.    Psychiatric:         Mood and Affect: Mood normal.       DIFFERENTIAL  DIAGNOSIS     DDX: COVID-19/influenza, viral illness, gastritis, enteritis, hepatitis    PLAN (LABS / IMAGING / EKG):  Orders Placed This Encounter   Procedures    COVID-19, Rapid    RAPID INFLUENZA A/B ANTIGENS    XR CHEST PORTABLE    CBC with Auto Differential    BMP    Lactic Acid    Lipase    Magnesium    Troponin    MYOGLOBIN, SERUM    CK    Inpatient consult to Cardiology    EKG 12 Lead       MEDICATIONS ORDERED:  Orders Placed This Encounter   Medications    0.9 % sodium chloride bolus    ondansetron (ZOFRAN) injection 4 mg    dicyclomine (BENTYL) injection 20 mg    0.9 % sodium chloride bolus           DIAGNOSTIC RESULTS / EMERGENCY DEPARTMENT COURSE / MDM     LABS:  Results for orders placed or performed during the hospital encounter of 10/01/22   COVID-19, Rapid    Specimen: Nasopharyngeal Swab   Result Value Ref Range    Specimen Description . NASOPHARYNGEAL SWAB     SARS-CoV-2, Rapid DETECTED (A) Not Detected   RAPID INFLUENZA A/B ANTIGENS    Specimen: Nasopharyngeal   Result Value Ref Range    Flu A Antigen NEGATIVE NEGATIVE    Flu B Antigen NEGATIVE NEGATIVE   CBC with Auto Differential   Result Value Ref Range    WBC 8.3 3.5 - 11.3 k/uL    RBC 4.32 4.21 - 5.77 m/uL    Hemoglobin 13.3 13.0 - 17.0 g/dL    Hematocrit 38.8 (L) 40.7 - 50.3 %    MCV 89.8 82.6 - 102.9 fL    MCH 30.8 25.2 - 33.5 pg    MCHC 34.3 28.4 - 34.8 g/dL    RDW 13.2 11.8 - 14.4 %    Platelets 649 404 - 361 k/uL    MPV 10.9 8.1 - 13.5 fL    NRBC Automated 0.0 0.0 per 100 WBC    Seg Neutrophils 59 36 - 65 %    Lymphocytes 24 24 - 43 %    Monocytes 17 (H) 3 - 12 %    Eosinophils % 0 (L) 1 - 4 %    Basophils 0 0 - 2 %    Immature Granulocytes 0 0 %    Segs Absolute 4.94 1.50 - 8.10 k/uL    Absolute Lymph # 1.96 1.10 - 3.70 k/uL    Absolute Mono # 1.40 (H) 0.10 - 1.20 k/uL    Absolute Eos # <0.03 0.00 - 0.44 k/uL    Basophils Absolute 0.03 0.00 - 0.20 k/uL    Absolute Immature Granulocyte <0.03 0.00 - 0.30 k/uL   BMP   Result Value Ref Range    Glucose 112 (H) 70 - 99 mg/dL    BUN 13 6 - 20 mg/dL    Creatinine 0.92 0.70 - 1.20 mg/dL    Calcium 8.7 8.6 - 10.4 mg/dL    Sodium 134 (L) 135 - 144 mmol/L    Potassium 3.6 (L) 3.7 - 5.3 mmol/L    Chloride 98 98 - 107 mmol/L    CO2 25 20 - 31 mmol/L    Anion Gap 11 9 - 17 mmol/L    GFR Non-African American >60 >60 mL/min    GFR African American >60 >60 mL/min    GFR Comment         Lactic Acid   Result Value Ref Range    Lactic Acid, Whole Blood 1.3 0.7 - 2.1 mmol/L   Lipase   Result Value Ref Range    Lipase 30 13 - 60 U/L   Magnesium   Result Value Ref Range    Magnesium 2.1 1.6 - 2.6 mg/dL   Troponin   Result Value Ref Range    Troponin, High Sensitivity <6 0 - 22 ng/L         IMPRESSION/MDM/ED COURSE:  40 y.o. male presented with 2 days of fatigue, nausea vomiting and some mild diarrhea. Patient is dehydrated clinically and states he does not feel well. Patient is a decreased appetite, and is not vaccinated for flu or COVID. Will swab for COVID and influenza. Fluid hydrate with 1 L normal saline, symptomatic Introl with Bentyl and Zofran, draw abdominal laboratory work-up as well as cardiac laboratory work-up for evaluation of patient's symptoms as well as his bradycardia. Will reevaluate after labs and scans are returned    ED Course as of 10/01/22 1237   Sat Oct 01, 2022   1234 Was able to speak with cardiology. Stated the patient would need to be admitted for evaluation by them. Stated that the patient went into the 20s again that he would need to have atropine regardless of his symptomology. We will proceed in this fashion. [ES]      ED Course User Index  [ES] Lennox Burn, MD         RADIOLOGY:  XR CHEST PORTABLE   Final Result   No acute abnormality. EKG  Marked sinus bradycardia  Normal axis  No ST elevations  No T wave inversions  No ectopy  Other than bradycardia, normal EKG. All EKG's are interpreted by the Emergency Department Physician who either signs or Co-signs this chart in the absence of a cardiologist.      PROCEDURES:  None    CONSULTS:  IP CONSULT TO CARDIOLOGY        FINAL IMPRESSION      1. Bradycardia    2. COVID-19          DISPOSITION / PLAN       DISPOSITION Decision To Admit 10/01/2022 12:37:00 PM        PATIENT REFERREDTO:  No follow-up provider specified.     DISCHARGE MEDICATIONS:  New Prescriptions    No medications on file       Lennox Burn MD  PGY 3  Resident Physician Emergency Medicine  10/01/22 12:37 PM        (Please note that portions of this note were completed with a voice recognition program.Efforts were made to edit the dictations but occasionally words are mis-transcribed.)       Malik Granados MD  Resident  10/01/22 8652

## 2022-10-01 NOTE — ED NOTES
Pt arrives to ED with nausea and vomiting. Pt states he has not been feeling right for the past couple of days and has been vomiting \"green stuff\" since yesterday. Pt has not had anything to eat/drink since yesterday. Pt states he has also been more tired recently and sleeps all the time. Pt hr is mayda in the 40s, pt states that is not unusual for him. Pt denies pain at this time. Pt A&Ox4, RR even and nonlabored. Dr. Steven Osorio at bedside to assess pt.      Mike Oscar RN  10/01/22 2497

## 2022-10-02 PROBLEM — Z87.898 HISTORY OF SYNCOPE: Status: ACTIVE | Noted: 2022-10-02

## 2022-10-02 LAB
ABSOLUTE EOS #: 0.04 K/UL (ref 0–0.44)
ABSOLUTE IMMATURE GRANULOCYTE: <0.03 K/UL (ref 0–0.3)
ABSOLUTE LYMPH #: 2.09 K/UL (ref 1.1–3.7)
ABSOLUTE MONO #: 0.59 K/UL (ref 0.1–1.2)
ALBUMIN SERPL-MCNC: 3.3 G/DL (ref 3.5–5.2)
ALBUMIN/GLOBULIN RATIO: 1.3 (ref 1–2.5)
ALP BLD-CCNC: 51 U/L (ref 40–129)
ALT SERPL-CCNC: 12 U/L (ref 5–41)
ANION GAP SERPL CALCULATED.3IONS-SCNC: 11 MMOL/L (ref 9–17)
AST SERPL-CCNC: 27 U/L
BASOPHILS # BLD: 1 % (ref 0–2)
BASOPHILS ABSOLUTE: 0.03 K/UL (ref 0–0.2)
BILIRUB SERPL-MCNC: 0.2 MG/DL (ref 0.3–1.2)
BUN BLDV-MCNC: 11 MG/DL (ref 6–20)
C-REACTIVE PROTEIN: <3 MG/L (ref 0–5)
CALCIUM SERPL-MCNC: 7.8 MG/DL (ref 8.6–10.4)
CHLORIDE BLD-SCNC: 105 MMOL/L (ref 98–107)
CO2: 21 MMOL/L (ref 20–31)
CREAT SERPL-MCNC: 0.84 MG/DL (ref 0.7–1.2)
EOSINOPHILS RELATIVE PERCENT: 1 % (ref 1–4)
FERRITIN: 236 NG/ML (ref 30–400)
GFR AFRICAN AMERICAN: >60 ML/MIN
GFR NON-AFRICAN AMERICAN: >60 ML/MIN
GFR SERPL CREATININE-BSD FRML MDRD: ABNORMAL ML/MIN/{1.73_M2}
GLUCOSE BLD-MCNC: 94 MG/DL (ref 70–99)
HCT VFR BLD CALC: 34.5 % (ref 40.7–50.3)
HEMOGLOBIN: 11.7 G/DL (ref 13–17)
IMMATURE GRANULOCYTES: 0 %
LYMPHOCYTES # BLD: 49 % (ref 24–43)
MCH RBC QN AUTO: 30.6 PG (ref 25.2–33.5)
MCHC RBC AUTO-ENTMCNC: 33.9 G/DL (ref 28.4–34.8)
MCV RBC AUTO: 90.3 FL (ref 82.6–102.9)
MONOCYTES # BLD: 14 % (ref 3–12)
NRBC AUTOMATED: 0 PER 100 WBC
PDW BLD-RTO: 13.3 % (ref 11.8–14.4)
PLATELET # BLD: ABNORMAL K/UL (ref 138–453)
PLATELET, FLUORESCENCE: 119 K/UL (ref 138–453)
PLATELET, IMMATURE FRACTION: 6 % (ref 1.1–10.3)
POTASSIUM SERPL-SCNC: 4.2 MMOL/L (ref 3.7–5.3)
RBC # BLD: 3.82 M/UL (ref 4.21–5.77)
SEG NEUTROPHILS: 36 % (ref 36–65)
SEGMENTED NEUTROPHILS ABSOLUTE COUNT: 1.55 K/UL (ref 1.5–8.1)
SODIUM BLD-SCNC: 137 MMOL/L (ref 135–144)
TOTAL PROTEIN: 5.8 G/DL (ref 6.4–8.3)
TSH SERPL DL<=0.05 MIU/L-ACNC: 1.06 UIU/ML (ref 0.3–5)
VITAMIN D 25-HYDROXY: 27.4 NG/ML
WBC # BLD: 4.3 K/UL (ref 3.5–11.3)

## 2022-10-02 PROCEDURE — 2580000003 HC RX 258: Performed by: CLINICAL NURSE SPECIALIST

## 2022-10-02 PROCEDURE — 36415 COLL VENOUS BLD VENIPUNCTURE: CPT

## 2022-10-02 PROCEDURE — 84443 ASSAY THYROID STIM HORMONE: CPT

## 2022-10-02 PROCEDURE — 85055 RETICULATED PLATELET ASSAY: CPT

## 2022-10-02 PROCEDURE — 85025 COMPLETE CBC W/AUTO DIFF WBC: CPT

## 2022-10-02 PROCEDURE — 82728 ASSAY OF FERRITIN: CPT

## 2022-10-02 PROCEDURE — 82306 VITAMIN D 25 HYDROXY: CPT

## 2022-10-02 PROCEDURE — 86140 C-REACTIVE PROTEIN: CPT

## 2022-10-02 PROCEDURE — 80053 COMPREHEN METABOLIC PANEL: CPT

## 2022-10-02 PROCEDURE — 2060000000 HC ICU INTERMEDIATE R&B

## 2022-10-02 PROCEDURE — 6360000002 HC RX W HCPCS: Performed by: CLINICAL NURSE SPECIALIST

## 2022-10-02 RX ORDER — ATROPINE SULFATE 0.1 MG/ML
0.5 INJECTION INTRAVENOUS PRN
Status: DISCONTINUED | OUTPATIENT
Start: 2022-10-02 | End: 2022-10-04 | Stop reason: HOSPADM

## 2022-10-02 RX ADMIN — ENOXAPARIN SODIUM 30 MG: 100 INJECTION SUBCUTANEOUS at 21:25

## 2022-10-02 RX ADMIN — ENOXAPARIN SODIUM 30 MG: 100 INJECTION SUBCUTANEOUS at 10:19

## 2022-10-02 RX ADMIN — SODIUM CHLORIDE 75 ML/HR: 9 INJECTION, SOLUTION INTRAVENOUS at 23:44

## 2022-10-02 RX ADMIN — SODIUM CHLORIDE 75 ML/HR: 9 INJECTION, SOLUTION INTRAVENOUS at 01:44

## 2022-10-02 ASSESSMENT — ENCOUNTER SYMPTOMS
SHORTNESS OF BREATH: 0
COUGH: 0
VOMITING: 0
CONSTIPATION: 0
BLOOD IN STOOL: 0
ABDOMINAL PAIN: 0
CHEST TIGHTNESS: 0
WHEEZING: 0
DIARRHEA: 0
NAUSEA: 0

## 2022-10-02 NOTE — PROGRESS NOTES
[facility] Summers, [facility]. Bradycardia [R00.1]  COVID-19 [U07.1] . Vitals:    10/02/22 0457   BP: (!) 95/59   Pulse: (!) 39   Resp:    Temp:    SpO2: 99%   . Patients home meds are   Prior to Admission medications    Medication Sig Start Date End Date Taking?  Authorizing Provider   ibuprofen (ADVIL;MOTRIN) 200 MG tablet Take 2 tablets by mouth every 8 hours as needed for Pain or Fever 8/19/18 8/24/18  Nain Mole, DO   acetaminophen (TYLENOL) 325 MG tablet Take 2 tablets by mouth every 6 hours as needed for Pain 8/19/18 8/24/18  [place]ak Mole, DO   .  RR 17  Breath Sounds: clear      Bronchodilator assessment at level  0  Hyperinflation assessment at level   Secretion Management assessment at level      [x]    Bronchodilator Assessment  BRONCHODILATOR ASSESSMENT SCORE  Score 0 1 2 3 4 5   Breath Sounds   [x]  Patient Baseline []  No Wheeze good aeration []  Faint, scattered wheezing, good aeration []  Expiratory Wheezing and or moderately diminished []  Insp/Exp wheeze and/or very diminished []  Insp/Exp and/ or marked distress   Respiratory Rate   [x]  Patient Baseline []  Less than 20 []  Less than 20 []  20-25 []  Greater than 25 []  Greater than 25   Peak flow % of Pred or PB [x]  NA   []  Greater than 90%  []  81-90% []  71-80% []  Less than or equal to 70%  or unable to perform []  Unable due to Respiratory Distress   Dyspnea re [x]  Patient Baseline []  No SOB []  No SOB []  SOB on exertion []  SOB min activity []  At rest/acute   e FEV% Predicted       [x]  NA []  Above 69%  []  Unable []  Above 60-69%  []  Unable []  Above 50-59%  []  Unable []  Above 35-49%  []  Unable []  Less than 35%  []  Unable

## 2022-10-02 NOTE — FLOWSHEET NOTE
10/02/22 1743   Vital Signs   Temp 99 °F (37.2 °C)   Temp Source Oral   Heart Rate 52   Heart Rate Source Monitor   Resp 17   BP (!) 109/59   MAP (Calculated) 75.67   Orthostatic B/P and Pulse?  Yes   Blood Pressure Lying 109/59   Pulse Lying 49 PER MINUTE   Blood Pressure Sitting 104/66   Pulse Sitting 67 PER MINUTE   Blood Pressure Standing 95/65   Pulse Standing 68 PER MINUTE   Level of Consciousness 0   MEWS Score 1   Oxygen Therapy   SpO2 96 %   O2 Device None (Room air)

## 2022-10-02 NOTE — PROGRESS NOTES
[facility]  Office: [address], DO, Darby Stone, DO, Anisha Angelo, DO, Daniels Blood, DO, Wilfrido Meza MD, Sravanthi Townsend MD, Andree Fabian MD, Ousmane Abreu MD,  Christiano Tinoco MD, Amador Owen MD, Conrado Salmon, DO, Kashif Steinberg MD,  Navin Leblanc MD, Manfred Pérez MD, Inocente Soulier, DO, Jay Carroll MD, Solange Johnson MD, Conor Aguilar MD, Dannie Hemphill MD, Waldo Bucio MD, Yu Meza MD, Kamlesh Christina, DO, Gene Back MD, Darryl Roe MD, Angel Stratton CNP,  Laron Connelly CNP, Americo Brody, CNP, Silvino Felton CNP,  Kalie Titus, [facility], Lashay Al, CNP, Chris Valenzuela, CNP, Mike Lee, CNP, Kate Ocampo, CNP, Sadi Eduardo, CNP, TAMELA SuárezC, Adilene Douglass, CNS, Faraz Shaikh, DNP, Alessandro Marcus, CNP, Edita Nicholson, CNP, Ashlee Swans, [address] [place] [address]    Progress Note    10/2/2022    2:45 PM    Name:   Kenyon Allison  MRN:     [id number]     Acct:      [de-identified]   Room:   [address] [place] [address] Day:  1  Admit Date:  10/1/2022  9:42 AM    PCP:   No primary care provider on file. Code Status:  Full Code    Subjective:     C/C:   Chief Complaint   Patient presents with    Nausea     Interval History Status: improved. Patient seen and examined at bedside, no acute events overnight. Continue with bradycardia worst was 29 while asleep yesterday, he continues to deny any symptoms with any of the above   His BP is soft  Has been having episodes of syncope if stood up quickly for years now per his report  He is not vaccinated   patient denies any chest pain, shortness of breath, chills, fevers, nausea or vomiting.   Patient vitals, labs and all providers notes were reviewed,from overnight shift and morning updates were noted and discussed with the nurse    Brief History:     Kenyon Allison is a 40 y.o. male who presents with Nausea  He was admitted Oct 1 through the ED for the management of Bradycardia. Pt tested + Covid, not vaccinated. Hx left PTX due to bleb, Nov '19. During that admit pt was told he was frequently bradycardic but there is no documentation of this in care everywhere. He reports Fever and chills since Sept 29, nausea with 1 episode of vomiting/diarrhea yesterday. Pt has also had gen body aches and  fatigue. In the ED he was noted to have a sinus bradycardia, at times in the 20's. Pt admits to freq episodes of dizziness. In fact, his co-workers know that if he changes position too fast they should be ready to catch him. He denies chest pain or shortness of breath    Review of Systems:     Review of Systems   Constitutional:  Positive for activity change, appetite change and fatigue. Negative for chills, diaphoresis and fever. HENT:  Negative for congestion. Eyes:  Negative for visual disturbance. Respiratory:  Negative for cough, chest tightness, shortness of breath and wheezing. Cardiovascular:  Negative for chest pain, palpitations and leg swelling. Gastrointestinal:  Negative for abdominal pain, blood in stool, constipation, diarrhea, nausea and vomiting. Genitourinary:  Negative for difficulty urinating. Neurological:  Negative for dizziness, weakness, light-headedness, numbness and headaches. All other systems reviewed and are negative. Medications:      Allergies:  No Known Allergies    Current Meds:   Scheduled Meds:    sodium chloride flush  5-40 mL IntraVENous 2 times per day    enoxaparin  30 mg SubCUTAneous BID     Continuous Infusions:    sodium chloride      sodium chloride 75 mL/hr (10/02/22 0144)     PRN Meds: atropine, sodium chloride flush, sodium chloride, ondansetron **OR** ondansetron, polyethylene glycol, acetaminophen **OR** acetaminophen, dextromethorphan-guaiFENesin    Data:     Past Medical History:   has a past medical history of Asthma and Spontaneous pneumothorax. Social History:   reports that he has been smoking cigarettes. He has been smoking an average of 1 pack per day. He does not have any smokeless tobacco history on file. He reports current drug use. Drug: Marijuana Aric Goldberg). He reports that he does not drink alcohol. Family History:   Family History   Adopted: Yes   Problem Relation Age of Onset    Kidney stones Mother     Lymphoma Mother     Suicide Father        Vitals:  /64   Pulse 50   Temp 99.1 °F (37.3 °C) (Oral)   Resp 16   Ht 6' 2\" (1.88 m)   Wt 180 lb 12.4 oz (82 kg)   SpO2 97%   BMI 23.21 kg/m²   Temp (24hrs), Av.8 °F (37.1 °C), Min:98.2 °F (36.8 °C), Max:99.5 °F (37.5 °C)    No results for input(s): POCGLU in the last 72 hours. I/O (24Hr):     Intake/Output Summary (Last 24 hours) at 10/2/2022 1445  Last data filed at 10/2/2022 3176  Gross per 24 hour   Intake 200 ml   Output --   Net 200 ml       Labs:  Hematology:  Recent Labs     10/01/22  1007 10/02/22  0732   WBC 8.3 4.3   RBC 4.32 3.82*   HGB 13.3 11.7*   HCT 38.8* 34.5*   MCV 89.8 90.3   MCH 30.8 30.6   MCHC 34.3 33.9   RDW 13.2 13.3    See Reflexed IPF Result   MPV 10.9  --      Chemistry:  Recent Labs     10/01/22  1007 10/01/22  1718 10/02/22  0732   *  --  137   K 3.6*  --  4.2   CL 98  --  105   CO2 25  --  21   GLUCOSE 112*  --  94   BUN 13  --  11   CREATININE 0.92  --  0.84   MG 2.1  --   --    ANIONGAP 11  --  11   LABGLOM >60  --  >60   GFRAA >60  --  >60   CALCIUM 8.7  --  7.8*   TROPHS <6 7  --    CKTOTAL 105  --   --    LACTACIDWB 1.3  --   --      Recent Labs     10/01/22  1007 10/02/22  0732   PROT  --  5.8*   LABALBU  --  3.3*   TSH  --  1.06   AST  --  27   ALT  --  12   ALKPHOS  --  51   BILITOT  --  0.2*   LIPASE 30  --      ABG:No results found for: POCPH, PHART, PH, POCPCO2, RPI3PGG, PCO2, POCPO2, PO2ART, PO2, POCHCO3, GXV3SEU, HCO3, NBEA, PBEA, BEART, BE, THGBART, THB, AKE1QHY, ISBW2MEW, V9WTAJWJ, O2SAT, FIO2  No results found for: SPECIAL  No results found for: CULTURE    Radiology:  XR CHEST PORTABLE    Result Date: 10/1/2022  No acute abnormality. Physical Examination:        Physical Exam  Vitals and nursing note reviewed. Constitutional:       General: He is not in acute distress. HENT:      Head: Normocephalic and atraumatic. Eyes:      Conjunctiva/sclera: Conjunctivae normal.      Pupils: Pupils are equal, round, and reactive to light. Cardiovascular:      Rate and Rhythm: Normal rate and regular rhythm. Heart sounds: No murmur heard. Pulmonary:      Effort: Pulmonary effort is normal. No accessory muscle usage or respiratory distress. Breath sounds: No stridor. No decreased breath sounds, wheezing, rhonchi or rales. Abdominal:      General: Bowel sounds are normal. There is no distension. Palpations: Abdomen is soft. Abdomen is not rigid. Tenderness: There is no abdominal tenderness. There is no guarding. Musculoskeletal:         General: No tenderness. Skin:     General: Skin is warm and dry. Findings: No erythema, lesion or rash. Neurological:      Mental Status: He is alert and oriented to person, place, and time. Cranial Nerves: No cranial nerve deficit. Motor: No seizure activity. Psychiatric:         Speech: Speech normal.         Behavior: Behavior normal. Behavior is cooperative.        Assessment:        Hospital Problems             Last Modified POA    * (Principal) Bradycardia 10/1/2022 Yes    Nicotine dependence, cigarettes, uncomplicated 03/1/8864 Yes    COVID 10/1/2022 Yes    History of syncope 10/2/2022 Yes       Plan:        Principal Problem:    Bradycardia  Active Problems:    Nicotine dependence, cigarettes, uncomplicated    COVID    History of syncope  Resolved Problems:    Asthma      -Gentle hydration  -Inflammatory markers  -TSH normal  -Continue telemetry  -Get orthostatic vitals  -Fall precautions  -EKG as needed  -Appreciate cardiology input  -Might benefit from EP consult  -Tobacco cessation education  -Nicotine patch added  -Consulted regarding continued on his energy drink consumption  ( consumes up to 12 can/day)   -Continue to monitor closely      Luvenia Duverney, MD  10/2/2022  2:45 PM

## 2022-10-02 NOTE — CONSULTS
[facility]    Consult / H&P               Today's Date: 10/2/2022  Patient Name: Álvaro Luevano  Date of admission: 10/1/2022  9:42 AM  Patient's age: 40 y.o., 1980  Admission Dx: Bradycardia [R00.1]  COVID-19 [U07.1]    Reason for Consult: bradycardia  Requesting Physician: No admitting provider for patient encounter. CHIEF COMPLAINT: Nausea, diarrhea. History Obtained From:  patient, EMR    HISTORY OF PRESENT ILLNESS:      The patient is a 40 y.o. male who presents to the hospital with nausea for few days and 1 episode of diarrhea. Patient does not have any significant past medical history aside from asthma, no chronic medications. Patient was found to be COVID-positive. Arrival here patient was found to be bradycardic at around 47 but otherwise other vital signs have been stable. Cardiology was consulted for his bradycardia which was going to 20. Atropine as needed ordered. Labs done in the ED were unremarkable, tropes and BNP WNL. Chest x-ray unremarkable. On my evaluation, patient is still bradycardic, otherwise other vital signs stable. The patient denies any chest pain, shortness of breath, dyspnea. Patient is not sure about family history of heart diseases as he was adopted. Past Medical History:   has a past medical history of Asthma and Spontaneous pneumothorax. Past Surgical History:   has a past surgical history that includes Hand surgery. Home Medications:    Prior to Admission medications    Medication Sig Start Date End Date Taking? Authorizing Provider   ibuprofen (ADVIL;MOTRIN) 200 MG tablet Take 2 tablets by mouth every 8 hours as needed for Pain or Fever 8/19/18 8/24/18  Miryam Bridgett, DO   acetaminophen (TYLENOL) 325 MG tablet Take 2 tablets by mouth every 6 hours as needed for Pain 8/19/18 8/24/18  Miryam Bridgett, DO       Allergies:  Patient has no known allergies.     Social History:   reports that he has been smoking cigarettes. He has been smoking an average of 1 pack per day. He does not have any smokeless tobacco history on file. He reports current drug use. Drug: Marijuana Daril Sin). He reports that he does not drink alcohol. Family History: family history includes Kidney stones in his mother; Lymphoma in his mother; Suicide in his father. He was adopted. No h/o sudden cardiac death. REVIEW OF SYSTEMS:    Constitutional: there has been no unanticipated weight loss. There's been No change in energy level, No change in activity level. Eyes: No visual changes or diplopia. No scleral icterus. ENT: No Headaches  Cardiovascular: as above  Respiratory: No previous pulmonary problems, No cough  Gastrointestinal: No abdominal pain. No change in bowel or bladder habits. Genitourinary: No dysuria, trouble voiding, or hematuria. Musculoskeletal:  No gait disturbance, No weakness or joint complaints. Integumentary: No rash or pruritis. Neurological: No headache, diplopia, change in muscle strength, numbness or tingling. No change in gait, balance, coordination, mood, affect, memory, mentation, behavior. Psychiatric: No anxiety, or depression. Endocrine: No temperature intolerance. No excessive thirst, fluid intake, or urination. No tremor. Hematologic/Lymphatic: No abnormal bruising or bleeding, blood clots or swollen lymph nodes. Allergic/Immunologic: No nasal congestion or hives. PHYSICAL EXAM:      BP (!) 95/59   Pulse (!) 39   Temp 98.4 °F (36.9 °C) (Oral)   Resp 17   Ht 6' 2\" (1.88 m)   Wt 180 lb 12.4 oz (82 kg)   SpO2 99%   BMI 23.21 kg/m²    Constitutional and General Appearance: alert, cooperative, no distress and appears stated age  HEENT: PERRL, no cervical lymphadenopathy. No masses palpable. Normal oral mucosa  Respiratory:  Normal excursion and expansion without use of accessory muscles  Resp Auscultation: Good respiratory effort. No for increased work of breathing.  On auscultation: clear to auscultation bilaterally  Cardiovascular: The apical impulse is not displaced  Heart tones are crisp and normal. regular S1 and S2.  Jugular venous pulsation Normal  The carotid upstroke is normal in amplitude and contour without delay or bruit  Peripheral pulses are symmetrical and full   Abdomen:   No masses or tenderness  Bowel sounds present  Extremities:   No Cyanosis or Clubbing   Lower extremity edema: No   Skin: Warm and dry  Neurological:  Alert and oriented. Moves all extremities well  No abnormalities of mood, affect, memory, mentation, or behavior are noted      Labs:     CBC:   Recent Labs     10/01/22  1007   WBC 8.3   HGB 13.3   HCT 38.8*        BMP:   Recent Labs     10/01/22  1007   *   K 3.6*   CO2 25   BUN 13   CREATININE 0.92   LABGLOM >60   GLUCOSE 112*     BNP: No results for input(s): BNP in the last 72 hours. PT/INR: No results for input(s): PROTIME, INR in the last 72 hours. APTT:No results for input(s): APTT in the last 72 hours. CARDIAC ENZYMES:  Recent Labs     10/01/22  1007   CKTOTAL 105     FASTING LIPID PANEL:No results found for: HDL, LDLDIRECT, LDLCALC, TRIG  LIVER PROFILE:No results for input(s): AST, ALT, LABALBU in the last 72 hours. Patient Active Problem List   Diagnosis    Bradycardia    Nicotine dependence, cigarettes, uncomplicated    COVID    Spontaneous pneumothorax         DATA:    IMPRESSION:    Asymptomatic resting sinus bradycardia. Hypokalemia. COVID-19. RECOMMENDATIONS:  Plan for echo tomorrow a.m. Further recommendations after echo. Avoid heart rate lowering medications. Atropine as needed. Replace potassium, keep it above 4. Keep magnesium above 2. Rest of the management as per primary team    Discussed with attending.      Denise Jeffery MD  Cardiology Service  Internal Medicine Residency Program, PGY-2  [facility]    Attending Physician Statement  I have discussed the care of Monika Cooljean Yuridia, including pertinent history and exam findings,  with the cardiology fellow/resident. I have completed at least one if not all key elements of the E/M (history, physical exam, and MDM). I agree with the assessment, plan and orders as documented by the resident with additional recommendations as below:     COVID19 Infection. Resting sinus bradycardia. No AVB on telemetry. HR in 40's while awake. BP normal. TSH normal. Not on any AVN blockers. Will obtain echo tomorrow. Will start theophylline in case HR is < 40. Atropine PRN.         Kris Ball MD, [facility]

## 2022-10-02 NOTE — PLAN OF CARE
Problem: Discharge Planning  Goal: Discharge to home or other facility with appropriate resources  Outcome: Progressing     Problem: Safety - Adult  Goal: Free from fall injury  Outcome: Progressing     Problem: ABCDS Injury Assessment  Goal: Absence of physical injury  Outcome: Progressing     Problem: Pain  Goal: Verbalizes/displays adequate comfort level or baseline comfort level  Outcome: Progressing     -will continue to monitor the patient

## 2022-10-02 NOTE — CARE COORDINATION
10/02/22 1440   Service Assessment   Patient Orientation Alert and Oriented   Cognition Alert   History Provided By Patient   Support Systems None   PCP Verified by CM No  (Pt requests PCP list)   Prior Functional Level Independent in ADLs/IADLs   Current Functional Level Independent in ADLs/IADLs   Can patient return to prior living arrangement Unknown at present   Ability to make needs known: Good   Family able to assist with home care needs: No   Would you like for me to discuss the discharge plan with any other family members/significant others, and if so, who? No   Social/Functional History   Lives With Alone  (homeless- lives in his truck)   Type of [address] [facility]   (lives in truck)   [address] Responsibilities Yes   Ambulation Assistance Independent   Transfer Assistance Independent   Active  Yes   Mode of Transportation Truck   Occupation Part time employment   Type of Occupation [employer]   Discharge Planning   Type of [address] [facility] Prior To Admission None   Potential Assistance Needed N/A   DME Ordered? No   Potential Assistance Purchasing Medications No   Type of Home Care Services None   Patient expects to be discharged to: Other (comment)  (back to truck)   History of falls? 0   Services At/After Discharge   Services At/After Discharge None   [employer] Information Provided? No   Mode of Transport at Discharge Self   Confirm Follow Up Transport Self   Condition of Participation: Discharge Planning   The Plan for Transition of Care is related to the following treatment goals: goal to discharge independently   The Patient and/or Patient Representative was provided with a Choice of Provider? Patient   The Patient and/Or Patient Representative agree with the Discharge Plan?  Yes   Freedom of Choice list was provided with basic dialogue that supports the patient's individualized plan of care/goals, treatment preferences, and shares the quality data associated with the providers? Yes   CM called and spoke with patient on hospital room phone to discuss transitional planning. Demographic information verified. Patient states that he has been homeless for the past few years and lives out of his truck. Patient declines SW consult for housing and shelter assistance. Patient plans to return to living in his truck at discharge. Patient is requesting hospital PCP list to establish with a provider.

## 2022-10-03 LAB
EKG ATRIAL RATE: 41 BPM
EKG P AXIS: 66 DEGREES
EKG P-R INTERVAL: 126 MS
EKG Q-T INTERVAL: 502 MS
EKG QRS DURATION: 90 MS
EKG QTC CALCULATION (BAZETT): 414 MS
EKG R AXIS: 49 DEGREES
EKG T AXIS: 58 DEGREES
EKG VENTRICULAR RATE: 41 BPM
LV EF: 60 %
LVEF MODALITY: NORMAL

## 2022-10-03 PROCEDURE — 6370000000 HC RX 637 (ALT 250 FOR IP): Performed by: CLINICAL NURSE SPECIALIST

## 2022-10-03 PROCEDURE — 2580000003 HC RX 258: Performed by: CLINICAL NURSE SPECIALIST

## 2022-10-03 PROCEDURE — 6370000000 HC RX 637 (ALT 250 FOR IP): Performed by: FAMILY MEDICINE

## 2022-10-03 PROCEDURE — 6360000002 HC RX W HCPCS: Performed by: CLINICAL NURSE SPECIALIST

## 2022-10-03 PROCEDURE — 93306 TTE W/DOPPLER COMPLETE: CPT

## 2022-10-03 PROCEDURE — 2060000000 HC ICU INTERMEDIATE R&B

## 2022-10-03 RX ORDER — ERGOCALCIFEROL 1.25 MG/1
50000 CAPSULE ORAL WEEKLY
Status: DISCONTINUED | OUTPATIENT
Start: 2022-10-03 | End: 2022-10-04 | Stop reason: HOSPADM

## 2022-10-03 RX ADMIN — ACETAMINOPHEN 650 MG: 325 TABLET ORAL at 08:28

## 2022-10-03 RX ADMIN — ERGOCALCIFEROL 50000 UNITS: 1.25 CAPSULE ORAL at 16:44

## 2022-10-03 RX ADMIN — SODIUM CHLORIDE, PRESERVATIVE FREE 10 ML: 5 INJECTION INTRAVENOUS at 20:38

## 2022-10-03 ASSESSMENT — ENCOUNTER SYMPTOMS
DIARRHEA: 0
SHORTNESS OF BREATH: 0
VOMITING: 0
ABDOMINAL PAIN: 0
NAUSEA: 0
COUGH: 0
CHEST TIGHTNESS: 0
WHEEZING: 0
BLOOD IN STOOL: 0
CONSTIPATION: 0

## 2022-10-03 ASSESSMENT — PAIN SCALES - GENERAL: PAINLEVEL_OUTOF10: 3

## 2022-10-03 NOTE — PLAN OF CARE
Problem: Discharge Planning  Goal: Discharge to home or other facility with appropriate resources  10/3/2022 1128 by Kelsie Laughlin RN  Outcome: Progressing  10/3/2022 0602 by Chalino Gaitan RN  Outcome: Progressing     Problem: Safety - Adult  Goal: Free from fall injury  10/3/2022 1128 by Kelsie Laughlin RN  Outcome: Progressing  10/3/2022 0602 by Chalino Gaitan RN  Outcome: Progressing     Problem: ABCDS Injury Assessment  Goal: Absence of physical injury  10/3/2022 1128 by Kelsie Laughlin RN  Outcome: Progressing  10/3/2022 0602 by Chalino Gaitan RN  Outcome: Progressing     Problem: Pain  Goal: Verbalizes/displays adequate comfort level or baseline comfort level  10/3/2022 1128 by Kelsie Laughlin RN  Outcome: Progressing  10/3/2022 0602 by Chalino Gaitan RN  Outcome: Progressing

## 2022-10-03 NOTE — CARE COORDINATION
Consult : homeless , lives in  truck  Chart reviewed  Noted pt is covid+. 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 Pt has been homeless for the past couple of years. Writer offered shelter and housing resources and pt declined.

## 2022-10-03 NOTE — PROGRESS NOTES
[facility]   Progress Note                   Date:   10/3/2022  Patient name: Dmitriy Bueno  Date of admission:  10/1/2022  9:42 AM  MRN:   [id number]  YOB: 1980  PCP: No primary care provider on file. Reason for Admission: Bradycardia [R00.1]  COVID-19 [U07.1]    Subjective:       Clinical Changes / Abnormalities: LAbs, medications, vitals and tele reviewed. Discussed with RN       Medications:   Scheduled Meds:   sodium chloride flush  5-40 mL IntraVENous 2 times per day    enoxaparin  30 mg SubCUTAneous BID     Continuous Infusions:   sodium chloride       CBC:   Recent Labs     10/01/22  1007 10/02/22  0732   WBC 8.3 4.3   HGB 13.3 11.7*    See Reflexed IPF Result     BMP:    Recent Labs     10/01/22  1007 10/02/22  0732   * 137   K 3.6* 4.2   CL 98 105   CO2 25 21   BUN 13 11   CREATININE 0.92 0.84   GLUCOSE 112* 94     Hepatic:   Recent Labs     10/02/22  0732   AST 27   ALT 12   BILITOT 0.2*   ALKPHOS 51     Troponin:   Recent Labs     10/01/22  1007 10/01/22  1718   TROPHS <6 7     BNP: No results for input(s): BNP in the last 72 hours. Lipids: No results for input(s): CHOL, HDL in the last 72 hours. Invalid input(s): LDLCALCU  INR: No results for input(s): INR in the last 72 hours. Objective:   Vitals: /66   Pulse (!) 49   Temp 99.7 °F (37.6 °C) (Oral)   Resp 18   Ht 6' 2\" (1.88 m)   Wt 180 lb 12.4 oz (82 kg)   SpO2 98%   BMI 23.21 kg/m²   For careful stewardship of limited PPE during COVID-19 pandemic my physical exam was deferred. For physical exam, please see today's physical from primary team or ICU team.          Assessment / Acute Cardiac Problems:   Asymptomatic resting sinus bradycardia. Hypokalemia. COVID-19.     Patient Active Problem List:     Bradycardia     Nicotine dependence, cigarettes, uncomplicated     COVID     Spontaneous pneumothorax     History of syncope      Plan of Treatment:   COVID 19 per primary Asymptomatic resting sinus bradycardia. TSH wnl. No AVN blockings.   HR currently 68  Await ECHO     Electronically signed by ALBERT Felipe CNP on 10/3/2022 at 10:08 AM  [address]  [phone]

## 2022-10-03 NOTE — PROGRESS NOTES
[facility]  Office: [address], DO, Rochester Ards, DO, Arik Cowley, DO, Cornelio Manzano Blood, DO, Curry Pacheco MD, Deandra Arnold MD, Sharon Wallis MD, Elodia Rojo MD,  Ed Sierra MD, Tierra Tyler MD, Katya Garcia, DO, Yasmine Sorto MD,  Guerda Deras MD, Rox Barrientos MD, Tori Moritz, DO, Roby Solis MD, Ayse Barrett MD, Doyle Thornton MD, Kristel Cason MD, Ronal Ruffin MD, Marta Moore MD, Aleksandar Bishpo DO, Konstantin Conklin MD, Jenni Kapoor MD, Rumalda Snellen, CNP,  Amy Ignacio, CNP, Juan José Trimble, CNP, Edison Levin, CNP,  Taty Mccord, [facility], Sharonda Moses, CNP, Deidra Humphrey, CNP, Sonya Lynch, CNP, Lyric Layne, CNP, Lexii Teixeira, CNP, Juancarlos Liu PA-C, Isauro Leo, CNS, Justo Kenyon, [facility], Roxi Diaz, CNP, Makeda Martinez, CNP, Angelia Erazo, [facility]    Progress Note    10/3/2022    8:44 AM    Name:   Ajit Bueno  MRN:     [id number]     Acct:      [de-identified]   Room:   [address] [place] [address] Day:  2  Admit Date:  10/1/2022  9:42 AM    PCP:   No primary care provider on file. Code Status:  Full Code    Subjective:     C/C:   Chief Complaint   Patient presents with    Nausea     Interval History Status: improved. Patient seen and examined at bedside, no acute events overnight. Continue with bradycardia during rest  He is not vaccinated   patient denies any chest pain, shortness of breath, chills, fevers, nausea or vomiting. Patient vitals, labs and all providers notes were reviewed,from overnight shift and morning updates were noted and discussed with the nurse    Brief History:     Ajit Bueno is a 40 y.o. male who presents with Nausea  He was admitted Oct 1 through the ED for the management of Bradycardia. Pt tested + Covid, not vaccinated. Hx left PTX due to bleb, Nov '19.   During that admit pt was told he was frequently bradycardic but there is no documentation of this in care everywhere. He reports Fever and chills since Sept 29, nausea with 1 episode of vomiting/diarrhea yesterday. Pt has also had gen body aches and  fatigue. In the ED he was noted to have a sinus bradycardia, at times in the 20's. Pt admits to freq episodes of dizziness. In fact, his co-workers know that if he changes position too fast they should be ready to catch him. He denies chest pain or shortness of breath    Review of Systems:     Review of Systems   Constitutional:  Positive for activity change, appetite change and fatigue. Negative for chills, diaphoresis and fever. HENT:  Negative for congestion. Eyes:  Negative for visual disturbance. Respiratory:  Negative for cough, chest tightness, shortness of breath and wheezing. Cardiovascular:  Negative for chest pain, palpitations and leg swelling. Gastrointestinal:  Negative for abdominal pain, blood in stool, constipation, diarrhea, nausea and vomiting. Genitourinary:  Negative for difficulty urinating. Neurological:  Negative for dizziness, weakness, light-headedness, numbness and headaches. All other systems reviewed and are negative. Medications: Allergies:  No Known Allergies    Current Meds:   Scheduled Meds:    sodium chloride flush  5-40 mL IntraVENous 2 times per day    enoxaparin  30 mg SubCUTAneous BID     Continuous Infusions:    sodium chloride       PRN Meds: atropine, sodium chloride flush, sodium chloride, ondansetron **OR** ondansetron, polyethylene glycol, acetaminophen **OR** acetaminophen, dextromethorphan-guaiFENesin    Data:     Past Medical History:   has a past medical history of Asthma and Spontaneous pneumothorax. Social History:   reports that he has been smoking cigarettes. He has been smoking an average of 1 pack per day. He does not have any smokeless tobacco history on file. He reports current drug use. Drug: Marijuana [place]).  He reports that he does not drink alcohol. Family History:   Family History   Adopted: Yes   Problem Relation Age of Onset    Kidney stones Mother     Lymphoma Mother     Suicide Father        Vitals:  /66   Pulse (!) 49   Temp 99.7 °F (37.6 °C) (Oral)   Resp 18   Ht 6' 2\" (1.88 m)   Wt 180 lb 12.4 oz (82 kg)   SpO2 98%   BMI 23.21 kg/m²   Temp (24hrs), Av.1 °F (37.3 °C), Min:98.8 °F (37.1 °C), Max:99.7 °F (37.6 °C)    No results for input(s): POCGLU in the last 72 hours. I/O (24Hr): Intake/Output Summary (Last 24 hours) at 10/3/2022 0844  Last data filed at 10/3/2022 0603  Gross per 24 hour   Intake 300 ml   Output --   Net 300 ml         Labs:  Hematology:  Recent Labs     10/01/22  1007 10/02/22  0732   WBC 8.3 4.3   RBC 4.32 3.82*   HGB 13.3 11.7*   HCT 38.8* 34.5*   MCV 89.8 90.3   MCH 30.8 30.6   MCHC 34.3 33.9   RDW 13.2 13.3    See Reflexed IPF Result   MPV 10.9  --    CRP  --  <3.0       Chemistry:  Recent Labs     10/01/22  1007 10/01/22  1718 10/02/22  0732   *  --  137   K 3.6*  --  4.2   CL 98  --  105   CO2 25  --  21   GLUCOSE 112*  --  94   BUN 13  --  11   CREATININE 0.92  --  0.84   MG 2.1  --   --    ANIONGAP 11  --  11   LABGLOM >60  --  >60   GFRAA >60  --  >60   CALCIUM 8.7  --  7.8*   TROPHS <6 7  --    CKTOTAL 105  --   --    LACTACIDWB 1.3  --   --        Recent Labs     10/01/22  1007 10/02/22  0732   PROT  --  5.8*   LABALBU  --  3.3*   TSH  --  1.06   AST  --  27   ALT  --  12   ALKPHOS  --  51   BILITOT  --  0.2*   LIPASE 30  --        ABG:No results found for: POCPH, PHART, PH, POCPCO2, QAR9WOB, PCO2, POCPO2, PO2ART, PO2, POCHCO3, TZQ7GDJ, HCO3, NBEA, PBEA, BEART, BE, THGBART, THB, LWO8WKC, TORD4NDI, I5TZXDPB, O2SAT, FIO2  No results found for: SPECIAL  No results found for: CULTURE    Radiology:  XR CHEST PORTABLE    Result Date: 10/1/2022  No acute abnormality. Physical Examination:        Physical Exam  Vitals and nursing note reviewed. Constitutional:       General: He is not in acute distress. HENT:      Head: Normocephalic and atraumatic. Eyes:      Conjunctiva/sclera: Conjunctivae normal.      Pupils: Pupils are equal, round, and reactive to light. Cardiovascular:      Rate and Rhythm: Normal rate and regular rhythm. Heart sounds: No murmur heard. Pulmonary:      Effort: Pulmonary effort is normal. No accessory muscle usage or respiratory distress. Breath sounds: No stridor. No decreased breath sounds, wheezing, rhonchi or rales. Abdominal:      General: Bowel sounds are normal. There is no distension. Palpations: Abdomen is soft. Abdomen is not rigid. Tenderness: There is no abdominal tenderness. There is no guarding. Musculoskeletal:         General: No tenderness. Skin:     General: Skin is warm and dry. Findings: No erythema, lesion or rash. Neurological:      Mental Status: He is alert and oriented to person, place, and time. Cranial Nerves: No cranial nerve deficit. Motor: No seizure activity. Psychiatric:         Speech: Speech normal.         Behavior: Behavior normal. Behavior is cooperative.        Assessment:        Hospital Problems             Last Modified POA    * (Principal) Bradycardia 10/1/2022 Yes    Nicotine dependence, cigarettes, uncomplicated 54/2/8610 Yes    COVID 10/1/2022 Yes    History of syncope 10/2/2022 Yes     Plan:        Principal Problem:    Bradycardia  Active Problems:    Nicotine dependence, cigarettes, uncomplicated    COVID    History of syncope  Resolved Problems:    Asthma      -Gentle hydration  -Inflammatory markers unremarkable   -TSH normal  - Start Vit D supplementation   -Continue telemetry  -Orthostatic vitals  -Fall precautions  -EKG as needed  - Echo pending  -Appreciate cardiology input  -Might benefit from EP consult  -Tobacco cessation education  -Nicotine patch added  -Consulted regarding continued on his energy drink consumption  ( consumes up to 12 can/day)   -Continue to monitor closely      Virginie Cronin MD  10/3/2022  8:44 AM

## 2022-10-04 VITALS
HEART RATE: 60 BPM | RESPIRATION RATE: 16 BRPM | HEIGHT: 74 IN | DIASTOLIC BLOOD PRESSURE: 67 MMHG | BODY MASS INDEX: 23.2 KG/M2 | TEMPERATURE: 99 F | OXYGEN SATURATION: 94 % | WEIGHT: 180.78 LBS | SYSTOLIC BLOOD PRESSURE: 111 MMHG

## 2022-10-04 LAB
ANION GAP SERPL CALCULATED.3IONS-SCNC: 9 MMOL/L (ref 9–17)
BUN BLDV-MCNC: 7 MG/DL (ref 6–20)
CALCIUM SERPL-MCNC: 8.5 MG/DL (ref 8.6–10.4)
CHLORIDE BLD-SCNC: 103 MMOL/L (ref 98–107)
CO2: 24 MMOL/L (ref 20–31)
CREAT SERPL-MCNC: 0.68 MG/DL (ref 0.7–1.2)
GFR SERPL CREATININE-BSD FRML MDRD: >60 ML/MIN/1.73M2
GLUCOSE BLD-MCNC: 98 MG/DL (ref 70–99)
POTASSIUM SERPL-SCNC: 4 MMOL/L (ref 3.7–5.3)
SODIUM BLD-SCNC: 136 MMOL/L (ref 135–144)

## 2022-10-04 PROCEDURE — 2580000003 HC RX 258: Performed by: CLINICAL NURSE SPECIALIST

## 2022-10-04 PROCEDURE — 80048 BASIC METABOLIC PNL TOTAL CA: CPT

## 2022-10-04 PROCEDURE — 36415 COLL VENOUS BLD VENIPUNCTURE: CPT

## 2022-10-04 RX ORDER — ERGOCALCIFEROL 1.25 MG/1
50000 CAPSULE ORAL WEEKLY
Qty: 5 CAPSULE | Refills: 0 | Status: SHIPPED | OUTPATIENT
Start: 2022-10-10 | End: 2022-11-22

## 2022-10-04 RX ORDER — GUAIFENESIN DEXTROMETHORPHAN HYDROBROMIDE ORAL SOLUTION 10; 100 MG/5ML; MG/5ML
5 SOLUTION ORAL EVERY 4 HOURS PRN
Qty: 1 EACH | Refills: 1 | Status: SHIPPED | OUTPATIENT
Start: 2022-10-04

## 2022-10-04 RX ADMIN — SODIUM CHLORIDE, PRESERVATIVE FREE 10 ML: 5 INJECTION INTRAVENOUS at 08:04

## 2022-10-04 ASSESSMENT — PAIN SCALES - GENERAL: PAINLEVEL_OUTOF10: 0

## 2022-10-04 NOTE — PLAN OF CARE
Problem: Discharge Planning  Goal: Discharge to home or other facility with appropriate resources  Outcome: Progressing     Problem: Safety - Adult  Goal: Free from fall injury  Outcome: Progressing  Flowsheets (Taken 10/3/2022 2000)  Free From Fall Injury: Instruct family/caregiver on patient safety     Problem: ABCDS Injury Assessment  Goal: Absence of physical injury  Outcome: Progressing  Flowsheets (Taken 10/3/2022 2000)  Absence of Physical Injury: Implement safety measures based on patient assessment     Problem: Pain  Goal: Verbalizes/displays adequate comfort level or baseline comfort level  Outcome: Progressing

## 2022-10-04 NOTE — DISCHARGE SUMMARY
[facility]  Office: [address], DO, Mck[facility], DO, Vvii July, DO, Unagaby Cagle Blood, DO, Rocky Juarez MD, June Alejandra MD, Bubba George MD, Sylvie Bernheim, MD,  Pia Astudillo MD, Nia Dodson MD, Tonya Lucas, DO, Radha Carty MD,  Gavin Campbell, DO, Andrews Soto MD, Abner Mark MD, Servando Ramos, DO, Lanette Painting MD, Moose Little MD, Madai eLe MD, Kam Sharif MD, Zaina Gallardo MD, Malik Nunez MD, Eneida Morris DO, Maral Carlin MD, Myron Knott MD, Leah Beasley, CNP,  Kathi Javier, CNP, Ko Blackmon, CNP, Mann Willett, CNP,  Zunilda Sifuentes, [facility], Neville Mohan, CNP, Mandy Barrow, CNP, Shamar Mcintyre, CNP, Joanna Garza, CNP, Ishan Blankenship, [facility], Juancarlos Hoskins PA-C, Marya Chun, [facility], Magdy Ponce, [facility], Yue Gao, CNP, Bhavana Brooks, CNP, Mckenna Bedolla, [facility] [facility] [place] [place]    Discharge Summary     Patient ID: Judy Pang  :  1980   MRN: [id number]     ACCOUNT:  [de-identified]   Patient's PCP: Pcp No  Admit Date: 10/1/2022   Discharge Date: 10/4/2022     Length of Stay: 3  Code Status:  Full Code  Admitting Physician: No admitting provider for patient encounter. Discharge Physician: Radha Carty MD     Active Discharge Diagnoses:     Hospital Problem Lists:  Principal Problem:    Bradycardia  Active Problems:    Nicotine dependence, cigarettes, uncomplicated    COVID    History of syncope  Resolved Problems:    Asthma      Admission Condition:  stable     Discharged Condition: stable    Hospital Stay:     Hospital Course:  Judy Pang is a 40 y.o. male who was admitted for the management of   Bradycardia , presented to ER with Nausea    40year old male past medical history of pneumothorax due to bleb in 2019, asymptomatic bradycardia presented with nausea and was found to be positive for covid 19.  Patient bradycardic and had not followed up with a cardiologist and they were consulted. Patient underwent echo  and to follow up with cardiology as outpatient. Echo:  Left ventricle is normal in size, global left ventricular systolic function  is normal, calculated ejection fraction is 60%. Normal right ventricular size and function. Mild tricuspid regurgitation. Estimated right ventricular systolic pressure is 24 mmHg. Significant therapeutic interventions: see above    Significant Diagnostic Studies:   Labs / Micro:  CBC:   Lab Results   Component Value Date/Time    WBC 4.3 10/02/2022 07:32 AM    RBC 3.82 10/02/2022 07:32 AM    HGB 11.7 10/02/2022 07:32 AM    HCT 34.5 10/02/2022 07:32 AM    MCV 90.3 10/02/2022 07:32 AM    MCH 30.6 10/02/2022 07:32 AM    MCHC 33.9 10/02/2022 07:32 AM    RDW 13.3 10/02/2022 07:32 AM    PLT See Reflexed IPF Result 10/02/2022 07:32 AM     BMP:    Lab Results   Component Value Date/Time    GLUCOSE 98 10/04/2022 07:45 AM     10/04/2022 07:45 AM    K 4.0 10/04/2022 07:45 AM     10/04/2022 07:45 AM    CO2 24 10/04/2022 07:45 AM    ANIONGAP 9 10/04/2022 07:45 AM    BUN 7 10/04/2022 07:45 AM    CREATININE 0.68 10/04/2022 07:45 AM    BUNCRER NOT REPORTED 08/10/2017 08:06 PM    CALCIUM 8.5 10/04/2022 07:45 AM    LABGLOM >60 10/04/2022 07:45 AM    GFRAA >60 10/02/2022 07:32 AM    GFR      10/02/2022 07:32 AM        Radiology:  XR CHEST PORTABLE    Result Date: 10/1/2022  No acute abnormality. Consultations:    Consults:     Final Specialist Recommendations/Findings:   IP CONSULT TO CARDIOLOGY  IP CONSULT TO HOSPITALIST  IP CONSULT TO SOCIAL WORK      The patient was seen and examined on day of discharge and this discharge summary is in conjunction with any daily progress note from day of discharge.     Discharge plan:     Disposition: Home    Physician Follow Up:     Pcp No    Follow up      Brigitte Weir MD  [address] [place] [address] 6  [address] [place] [id number]  [phone]    Follow up       Requiring Further Evaluation/Follow Up POST HOSPITALIZATION/Incidental Findings: follow up cardiology, encoruage patient to follow up with PCP    Diet: regular diet    Activity: As tolerated    Instructions to Patient: please follow up with cardiology as outpatient follow up with a PCP as well    Discharge Medications:      Medication List        START taking these medications      dextromethorphan-guaiFENesin  MG/5ML syrup  Commonly known as: ROBITUSSIN-DM  Take 5 mLs by mouth every 4 hours as needed for Cough     Vitamin D (Ergocalciferol) 79457 units Caps  Take 50,000 Units by mouth once a week for 7 doses  Start taking on: October 10, 2022            Sam Morales taking these medications      acetaminophen 325 MG tablet  Commonly known as: Tylenol  Take 2 tablets by mouth every 6 hours as needed for Pain     ibuprofen 200 MG tablet  Commonly known as: ADVIL;MOTRIN  Take 2 tablets by mouth every 8 hours as needed for Pain or Fever               Where to Get Your Medications        These medications were sent to [address]  [address] [facility], [address] [facility] [address]      Phone: [phone]   dextromethorphan-guaiFENesin  MG/5ML syrup  Vitamin D (Ergocalciferol) 33363 units Caps         Discharge Procedure Orders   [facility]ANE - Sofi Abad MD, Cardiology, [place]   Referral Priority: Routine Referral Type: Eval and Treat   Referral Reason: Specialty Services Required   Referred to Provider: Devin Singh Requested Specialty: Cardiology   Number of Visits Requested: 1       Time Spent on discharge is  31 mins in patient examination, evaluation, counseling as well as medication reconciliation, prescriptions for required medications, discharge plan and follow up.     Electronically signed by   Kevin Garcia MD  10/4/2022  2:47 PM

## 2022-10-04 NOTE — PROGRESS NOTES
[facility]  Office: [address], DO, Edilia Cruz, DO, Nicole Suarez, DO, Lisa Milner Blood, DO, Noe Adair MD, Susy Maldonado MD, Yvone Phalen, MD, Ian Reilly MD,  Homer Hashimoto, MD, Elias Francis MD, Agustin Alanis DO, Ana Villasenor MD,  Heidy Harper MD, Sara Ravi MD, Idris Huggins DO, Shimon Gipson MD, Kellie Almanza MD, Meghan Membreno MD, Mikayla Salcedo MD, Norma Lantigua MD, Ana Luong MD, Aundrea Naylor DO, Paula Worley MD, Yue Horn MD, Renetta Li, CNP,  Brittany Wynn, CNP, Leyda Ford, CNP, Tequila Levi, CNP,  Donavan Ojeda, [facility], Mandy Sim, CNP, Taz Lozano, CNP, Kvng Blanco, CNP, Valeria George, CNP, Ashtyn Pascual, CNP, Lisa Pratt PA-C, Deepa Funk, CNS, Charlene Zhao, [facility], Gael Lorenz, CNP, Gerson Hong, [facility], Douglas Stern, [address] [place] [address]    Progress Note    10/4/2022    9:02 AM    Name:   Linsey Myrick  MRN:     [id number]     Acct:      [de-identified]   Room:   [address] [place] [address] Day:  3  Admit Date:  10/1/2022  9:42 AM    PCP:   No primary care provider on file. Code Status:  Full Code    Subjective:     C/C:   Chief Complaint   Patient presents with    Nausea     Interval History Status: not changed. Patient seen and examined. Intermittent bradycardic with out symptoms, wants to go home. Brief History:     40year old male past medical history of pneumothorax due to bleb in 2019, asymptomatic bradycardia presented with nausea and was found to be positive for covid 19. Patient bradycardic and had not followed up with a cardiologist and they were consulted. Patient underwent echo  and to follow up with cardiology as outpatient. Echo:  Left ventricle is normal in size, global left ventricular systolic function  is normal, calculated ejection fraction is 60%. Normal right ventricular size and function.   Mild tricuspid regurgitation. Estimated right ventricular systolic pressure is 24 mmHg. Review of Systems:     Constitutional:  negative for chills, fevers, sweats  Respiratory:  negative for cough, dyspnea on exertion, shortness of breath, wheezing  Cardiovascular:  negative for chest pain, chest pressure/discomfort, lower extremity edema, palpitations  Gastrointestinal:  negative for abdominal pain, constipation, diarrhea, nausea, vomiting  Neurological:  negative for dizziness, headache    Medications: Allergies:  No Known Allergies    Current Meds:   Scheduled Meds:    vitamin D  50,000 Units Oral Weekly    sodium chloride flush  5-40 mL IntraVENous 2 times per day    enoxaparin  30 mg SubCUTAneous BID     Continuous Infusions:    sodium chloride       PRN Meds: atropine, sodium chloride flush, sodium chloride, ondansetron **OR** ondansetron, polyethylene glycol, acetaminophen **OR** acetaminophen, dextromethorphan-guaiFENesin    Data:     Past Medical History:   has a past medical history of Asthma and Spontaneous pneumothorax. Social History:   reports that he has been smoking cigarettes. He has been smoking an average of 1 pack per day. He does not have any smokeless tobacco history on file. He reports current drug use. Drug: Marijuana [place]). He reports that he does not drink alcohol. Family History:   Family History   Adopted: Yes   Problem Relation Age of Onset    Kidney stones Mother     Lymphoma Mother     Suicide Father        Vitals:  BP (!) 108/58   Pulse (!) 43   Temp 98.6 °F (37 °C) (Oral)   Resp 18   Ht 6' 2\" (1.88 m)   Wt 180 lb 12.4 oz (82 kg)   SpO2 97%   BMI 23.21 kg/m²   Temp (24hrs), Av.1 °F (36.7 °C), Min:97.7 °F (36.5 °C), Max:98.9 °F (37.2 °C)    No results for input(s): POCGLU in the last 72 hours. I/O (24Hr):     Intake/Output Summary (Last 24 hours) at 10/4/2022 09  Last data filed at 10/4/2022 0804  Gross per 24 hour   Intake 360 ml   Output --   Net 360 ml Labs:  Hematology:  Recent Labs     10/01/22  1007 10/02/22  0732   WBC 8.3 4.3   RBC 4.32 3.82*   HGB 13.3 11.7*   HCT 38.8* 34.5*   MCV 89.8 90.3   MCH 30.8 30.6   MCHC 34.3 33.9   RDW 13.2 13.3    See Reflexed IPF Result   MPV 10.9  --    CRP  --  <3.0     Chemistry:  Recent Labs     10/01/22  1007 10/01/22  1718 10/02/22  0732 10/04/22  0745   *  --  137 136   K 3.6*  --  4.2 4.0   CL 98  --  105 103   CO2 25  --  21 24   GLUCOSE 112*  --  94 98   BUN 13  --  11 7   CREATININE 0.92  --  0.84 0.68*   MG 2.1  --   --   --    ANIONGAP 11  --  11 9   LABGLOM >60  --  >60 >60   GFRAA >60  --  >60  --    CALCIUM 8.7  --  7.8* 8.5*   TROPHS <6 7  --   --    CKTOTAL 105  --   --   --    LACTACIDWB 1.3  --   --   --      Recent Labs     10/01/22  1007 10/02/22  0732   PROT  --  5.8*   LABALBU  --  3.3*   TSH  --  1.06   AST  --  27   ALT  --  12   ALKPHOS  --  51   BILITOT  --  0.2*   LIPASE 30  --      ABG:No results found for: POCPH, PHART, PH, POCPCO2, WNX6UBN, PCO2, POCPO2, PO2ART, PO2, POCHCO3, DID2RBZ, HCO3, NBEA, PBEA, BEART, BE, THGBART, THB, IPR6OLG, OAFI3WIL, G1YKMISV, O2SAT, FIO2  No results found for: SPECIAL  No results found for: CULTURE    Radiology:  XR CHEST PORTABLE    Result Date: 10/1/2022  No acute abnormality.        Physical Examination:        General appearance:  alert, cooperative and no distress  Mental Status:  oriented to person, place and time and normal affect  Lungs:  decreased in bases  Heart:  regular rate and rhythm, no murmur  Abdomen:  soft, nontender, nondistended, normal bowel sounds, no masses, hepatomegaly, splenomegaly  Extremities:  no edema, redness, tenderness in the calves  Skin:  no gross lesions, rashes, induration    Assessment:        Hospital Problems             Last Modified POA    * (Principal) Bradycardia 10/1/2022 Yes    Nicotine dependence, cigarettes, uncomplicated 77/6/6991 Yes    COVID 10/1/2022 Yes    History of syncope 10/2/2022 Yes Plan:        Isolate till 10/9/22  Symptoatic treatment for void  Encourage smoking cessation  Discusseed with cardiology about echo and agrees with follow up  Echo stable, outpatient follow up with cardiology    Dina Muniz MD  10/4/2022  9:02 AM

## 2022-10-04 NOTE — PLAN OF CARE
Problem: Discharge Planning  Goal: Discharge to home or other facility with appropriate resources  10/4/2022 1323 by Stephen Schaffer RN  Outcome: Adequate for Discharge  10/4/2022 0542 by Jorge Lombardi  Outcome: Progressing     Problem: Safety - Adult  Goal: Free from fall injury  10/4/2022 1323 by Stephen Schaffer RN  Outcome: Adequate for Discharge  10/4/2022 0542 by Jorge Lombardi  Outcome: Progressing  Flowsheets (Taken 10/3/2022 2000)  Free From Fall Injury: Instruct family/caregiver on patient safety     Problem: ABCDS Injury Assessment  Goal: Absence of physical injury  10/4/2022 1323 by Stephen Schaffer RN  Outcome: Adequate for Discharge  10/4/2022 0542 by Jorge Lombardi  Outcome: Progressing  Flowsheets (Taken 10/3/2022 2000)  Absence of Physical Injury: Implement safety measures based on patient assessment     Problem: Pain  Goal: Verbalizes/displays adequate comfort level or baseline comfort level  10/4/2022 1323 by Stephen Schaffer RN  Outcome: Adequate for Discharge  10/4/2022 0542 by Jorge Lombardi  Outcome: Progressing

## 2022-10-05 ENCOUNTER — CARE COORDINATION (OUTPATIENT)
Dept: CASE MANAGEMENT | Age: 42
End: 2022-10-05

## 2022-10-05 NOTE — CARE COORDINATION
[facility] Care Transitions Initial Follow Up Call    Call within 2 business days of discharge: Yes    Care Transition Nurse contacted the patient by telephone to perform post hospital discharge assessment. Verified name and  with patient as identifiers. Provided introduction to self, and explanation of the Care Transition Nurse role. Patient: Judy Pang Patient : 1980   MRN: [id number]  Reason for Admission:   Discharge Date: 10/4/22 RARS: Readmission Risk Score: 6.4      Last Discharge [address]       Date Complaint Diagnosis Description Type Department Provider    10/1/22 Nausea Bradycardia . .. ED to Hosp-Admission (Discharged) (ADMITTED) [facility] Radha Carty MD; Joana Branch... Was this an external facility discharge? No Discharge Facility: [facility]    Challenges to be reviewed by the provider   Additional needs identified to be addressed with provider: No  none               Method of communication with provider: none. writer spoke to patient, he is doing ok, still has a cough, no fever, chills, sob, ha, n/v/d, ,  patient asked about how long he should quarantine but is back to work already today, patient has no pcp or medical insurance, declined all help from writer in regards to assisting in getting pcp or insurance, reviewed covid precautions and s/s, explained role of CTN, provided contact information, will follow//JU    Care Transition Nurse reviewed discharge instructions with patient who verbalized understanding. The patient was given an opportunity to ask questions and does not have any further questions or concerns at this time. Were discharge instructions available to patient? Yes. Reviewed appropriate site of care based on symptoms and resources available to patient including: Urgent care clinics  When to call 911. Advance Care Planning:   Does patient have an Advance Directive: Shawn López     Medication reconciliation was performed with patient, who verbalizes understanding of administration of home medications. Medications reviewed, 1111F entered: N/A    Was patient discharged with a pulse oximeter? no    Non-face-to-face services provided:       Offered patient enrollment in the Remote Patient Monitoring (RPM) program for in-home monitoring: NA.    Care Transitions 24 Hour Call    Schedule Follow Up Appointment with PCP: Declined  Do you have a copy of your discharge instructions?: Yes  Do you have all of your prescriptions and are they filled?: Yes  Have you been contacted by a Candance Blare Pharmacist?: No  Have you scheduled your follow up appointment?: No  Care Transitions Interventions  Disease Association: Completed               Follow Up  No future appointments. Care Transition Nurse provided contact information. Plan for follow-up call in 5-7 days based on severity of symptoms and risk factors.   Plan for next call:       Kami Farooq RN

## 2022-10-07 NOTE — ED PROVIDER NOTES
[facility]  eMERGENCY dEPARTMENT eNCOUnter   Attending Attestation     Pt Name: Paco Hair  MRN: [id number]  [place] 1980  Date of evaluation: 10/7/22       Paco Hair is a 40 y.o. male who presents with Nausea      PT with COVID symptoms Satting well. Plan for labs, Covid Test positive, discharge if normal bedside march. I performed a history and physical examination of the patient and discussed management with the resident. I reviewed the residents note and agree with the documented findings and plan of care. Any areas of disagreement are noted on the chart. I was personally present for the key portions of any procedures. I have documented in the chart those procedures where I was not present during the key portions. I have personally reviewed all images and agree with the resident's interpretation. I have reviewed the emergency nurses triage note. I agree with the chief complaint, past medical history, past surgical history, allergies, medications, social and family history as documented unless otherwise noted below. Documentation of the HPI, Physical Exam and Medical Decision Making performed by medical students or scribes is based on my personal performance of the HPI, PE and MDM. For Phys Assistant/ Nurse Practitioner cases/documentation I have had a face to face evaluation of this patient and have completed at least one if not all key elements of the E/M (history, physical exam, and MDM). Additional findings are as noted. For APC cases I have personally evaluated and examined the patient in conjunction with the APC and agree with the treatment plan and disposition of the patient as recorded by the APC.     Yasmeen Hong MD  Attending Emergency  Physician       Luis Daniel Kay MD  10/07/22 8737

## 2023-07-18 ENCOUNTER — HOSPITAL ENCOUNTER (EMERGENCY)
Age: 43
Discharge: HOME | End: 2023-07-18
Attending: EMERGENCY MEDICINE

## 2023-07-18 ENCOUNTER — APPOINTMENT (OUTPATIENT)
Dept: CT IMAGING | Age: 43
End: 2023-07-18

## 2023-07-18 VITALS
WEIGHT: 185 LBS | OXYGEN SATURATION: 99 % | DIASTOLIC BLOOD PRESSURE: 62 MMHG | HEART RATE: 105 BPM | TEMPERATURE: 97.8 F | RESPIRATION RATE: 16 BRPM | SYSTOLIC BLOOD PRESSURE: 108 MMHG | BODY MASS INDEX: 23.75 KG/M2

## 2023-07-18 DIAGNOSIS — L03.213 PERIORBITAL CELLULITIS OF RIGHT EYE: Primary | ICD-10-CM

## 2023-07-18 DIAGNOSIS — J01.90 ACUTE SINUSITIS, RECURRENCE NOT SPECIFIED, UNSPECIFIED LOCATION: ICD-10-CM

## 2023-07-18 PROCEDURE — 99285 EMERGENCY DEPT VISIT HI MDM: CPT

## 2023-07-18 PROCEDURE — 96374 THER/PROPH/DIAG INJ IV PUSH: CPT

## 2023-07-18 PROCEDURE — 70481 CT ORBIT/EAR/FOSSA W/DYE: CPT

## 2023-07-18 PROCEDURE — 6370000000 HC RX 637 (ALT 250 FOR IP)

## 2023-07-18 PROCEDURE — 6360000004 HC RX CONTRAST MEDICATION: Performed by: EMERGENCY MEDICINE

## 2023-07-18 PROCEDURE — 6360000002 HC RX W HCPCS: Performed by: EMERGENCY MEDICINE

## 2023-07-18 RX ORDER — IBUPROFEN 200 MG
400 TABLET ORAL EVERY 8 HOURS PRN
Qty: 30 TABLET | Refills: 0 | Status: SHIPPED | OUTPATIENT
Start: 2023-07-18 | End: 2023-07-23

## 2023-07-18 RX ORDER — CLINDAMYCIN HYDROCHLORIDE 150 MG/1
300 CAPSULE ORAL ONCE
Status: DISCONTINUED | OUTPATIENT
Start: 2023-07-18 | End: 2023-07-18

## 2023-07-18 RX ORDER — AMOXICILLIN AND CLAVULANATE POTASSIUM 875; 125 MG/1; MG/1
1 TABLET, FILM COATED ORAL 2 TIMES DAILY
Qty: 14 TABLET | Refills: 0 | Status: SHIPPED | OUTPATIENT
Start: 2023-07-18 | End: 2023-07-25

## 2023-07-18 RX ORDER — AMOXICILLIN AND CLAVULANATE POTASSIUM 875; 125 MG/1; MG/1
1 TABLET, FILM COATED ORAL ONCE
Status: COMPLETED | OUTPATIENT
Start: 2023-07-18 | End: 2023-07-18

## 2023-07-18 RX ORDER — TETRACAINE HYDROCHLORIDE 5 MG/ML
1 SOLUTION OPHTHALMIC ONCE
Status: COMPLETED | OUTPATIENT
Start: 2023-07-18 | End: 2023-07-18

## 2023-07-18 RX ORDER — KETOROLAC TROMETHAMINE 30 MG/ML
30 INJECTION, SOLUTION INTRAMUSCULAR; INTRAVENOUS ONCE
Status: COMPLETED | OUTPATIENT
Start: 2023-07-18 | End: 2023-07-18

## 2023-07-18 RX ADMIN — FLUORESCEIN SODIUM 1 MG: 1 STRIP OPHTHALMIC at 21:57

## 2023-07-18 RX ADMIN — TETRACAINE HYDROCHLORIDE 1 DROP: 5 SOLUTION OPHTHALMIC at 21:57

## 2023-07-18 RX ADMIN — AMOXICILLIN AND CLAVULANATE POTASSIUM 1 TABLET: 875; 125 TABLET, FILM COATED ORAL at 22:08

## 2023-07-18 RX ADMIN — KETOROLAC TROMETHAMINE 30 MG: 30 INJECTION, SOLUTION INTRAMUSCULAR; INTRAVENOUS at 20:39

## 2023-07-18 RX ADMIN — IOPAMIDOL 75 ML: 755 INJECTION, SOLUTION INTRAVENOUS at 20:51

## 2023-07-18 ASSESSMENT — VISUAL ACUITY
OS: 20/20
OD: 20/20
OU: 20/20

## 2023-07-18 ASSESSMENT — PAIN SCALES - GENERAL: PAINLEVEL_OUTOF10: 5

## 2023-07-18 ASSESSMENT — PAIN - FUNCTIONAL ASSESSMENT: PAIN_FUNCTIONAL_ASSESSMENT: 0-10

## 2023-07-18 NOTE — ED TRIAGE NOTES
Woke up yesterday and noticed his right eye was swollen  Today be now reports some pain and is having difficulty seeing out of the right eye

## 2023-07-19 ASSESSMENT — ENCOUNTER SYMPTOMS
EYE REDNESS: 1
SORE THROAT: 0
EYE PAIN: 1

## 2023-07-19 NOTE — ED PROVIDER NOTES
[address] ED  Emergency Department Encounter  Emergency Medicine Resident     Pt Name:Meek Shi  MRN: [id number]  [address] [facility] 1980  Date of evaluation: 7/18/23  PCP:  Pcp No  Note Started: 9:33 PM EDT      CHIEF COMPLAINT       Chief Complaint   Patient presents with    Eye Pain       HISTORY OF PRESENT ILLNESS  (Location/Symptom, Timing/Onset, Context/Setting, Quality, Duration, Modifying Factors, Severity.)      Key Welch is a 44 y.o. male with a history of asthma who presents with right eye pain and swelling that began yesterday. Patient states he noticed his eyelid becoming more swollen and having pain with opening his eye. Denies concern for foreign body in his eye. States he has not had trauma the area. States his vision is slightly altered. He is having difficulty opening his right eyelid. No fevers or chills at home. Denies headaches, sore throat, difficulty swallowing, numbness, weakness. PAST MEDICAL / SURGICAL / SOCIAL / FAMILY HISTORY      has a past medical history of Asthma and Spontaneous pneumothorax.       has a past surgical history that includes Hand surgery.       Social History     Socioeconomic History    Marital status: Single     Spouse name: Not on file    Number of children: Not on file    Years of education: Not on file    Highest education level: Not on file   Occupational History    Not on file   Tobacco Use    Smoking status: Every Day     Packs/day: 1.00     Types: Cigarettes    Smokeless tobacco: Not on file   Substance and Sexual Activity    Alcohol use: No    Drug use: Yes     Types: Marijuana Kreg Sjogren)    Sexual activity: Not on file   Other Topics Concern    Not on file   Social History Narrative    Not on file     Social Determinants of Health     Financial Resource Strain: Not on file   Food Insecurity: Not on file   Transportation Needs: Not on file   Physical Activity: Not on file   Stress: Not on file   Social Connections: Not on

## 2023-07-19 NOTE — DISCHARGE INSTRUCTIONS
You were seen for evaluation of pain and swelling of your right eye. CT scan of your eyes showing infection in your sinuses and in the skin surrounding your eye. You were given antibiotics in the emergency department. You should continue to take the antibiotics called Augmentin at home for the entire course and do not skip any doses. You will be given ibuprofen that you can take at home for pain. Return to the emergency department for any worsening eye pain, vision changes, swelling, drainage, fevers, chills, headaches, loss of consciousness, chest pain, shortness of breath, other new or concerning symptoms. Otherwise you should follow-up outpatient with ENT information for an ENT is included below    You will need to follow up with ENT. Appointment can be arranged with an adult ENT provider. You can contact EITHER of the practices listed here to schedule a follow up. Please contact the office at the number listed below to coordinate follow up.      OPTION 1:  Ear, Nose, and Throat surgeons at [facility]     [facility]:  Dr. Vanessa Elizabeth and Dr. Long Posada  [address] [place] [address],[address]  [phone]     OPTION 2:  [facility]  [address]  [place], [address] [facility]  Appointment scheduling:  [phone]

## 2023-08-04 ENCOUNTER — HOSPITAL ENCOUNTER (EMERGENCY)
Age: 43
Discharge: HOME | End: 2023-08-05
Attending: EMERGENCY MEDICINE

## 2023-08-04 ENCOUNTER — APPOINTMENT (OUTPATIENT)
Dept: CT IMAGING | Age: 43
End: 2023-08-04

## 2023-08-04 VITALS
SYSTOLIC BLOOD PRESSURE: 122 MMHG | RESPIRATION RATE: 17 BRPM | HEART RATE: 62 BPM | WEIGHT: 174 LBS | BODY MASS INDEX: 22.33 KG/M2 | DIASTOLIC BLOOD PRESSURE: 72 MMHG | TEMPERATURE: 98.4 F | OXYGEN SATURATION: 98 % | HEIGHT: 74 IN

## 2023-08-04 DIAGNOSIS — L03.213 PERIORBITAL CELLULITIS OF RIGHT EYE: Primary | ICD-10-CM

## 2023-08-04 DIAGNOSIS — J01.90 ACUTE SINUSITIS, RECURRENCE NOT SPECIFIED, UNSPECIFIED LOCATION: ICD-10-CM

## 2023-08-04 LAB
ANION GAP SERPL CALCULATED.3IONS-SCNC: 9 MMOL/L (ref 9–17)
BASOPHILS # BLD: 0.04 K/UL (ref 0–0.2)
BASOPHILS NFR BLD: 0 % (ref 0–2)
BUN SERPL-MCNC: 8 MG/DL (ref 6–20)
CALCIUM SERPL-MCNC: 9.4 MG/DL (ref 8.6–10.4)
CHLORIDE SERPL-SCNC: 104 MMOL/L (ref 98–107)
CO2 SERPL-SCNC: 27 MMOL/L (ref 20–31)
CREAT SERPL-MCNC: 0.7 MG/DL (ref 0.7–1.2)
EOSINOPHIL # BLD: 0.15 K/UL (ref 0–0.44)
EOSINOPHILS RELATIVE PERCENT: 1 % (ref 1–4)
ERYTHROCYTE [DISTWIDTH] IN BLOOD BY AUTOMATED COUNT: 12.7 % (ref 11.8–14.4)
GFR SERPL CREATININE-BSD FRML MDRD: >60 ML/MIN/1.73M2
GLUCOSE SERPL-MCNC: 109 MG/DL (ref 70–99)
HCT VFR BLD AUTO: 41 % (ref 40.7–50.3)
HGB BLD-MCNC: 13.2 G/DL (ref 13–17)
IMM GRANULOCYTES # BLD AUTO: 0.04 K/UL (ref 0–0.3)
IMM GRANULOCYTES NFR BLD: 0 %
LYMPHOCYTES NFR BLD: 2.95 K/UL (ref 1.1–3.7)
LYMPHOCYTES RELATIVE PERCENT: 23 % (ref 24–43)
MCH RBC QN AUTO: 30.3 PG (ref 25.2–33.5)
MCHC RBC AUTO-ENTMCNC: 32.2 G/DL (ref 28.4–34.8)
MCV RBC AUTO: 94.3 FL (ref 82.6–102.9)
MONOCYTES NFR BLD: 0.95 K/UL (ref 0.1–1.2)
MONOCYTES NFR BLD: 8 % (ref 3–12)
NEUTROPHILS NFR BLD: 68 % (ref 36–65)
NEUTS SEG NFR BLD: 8.53 K/UL (ref 1.5–8.1)
NRBC BLD-RTO: 0 PER 100 WBC
PLATELET # BLD AUTO: 203 K/UL (ref 138–453)
PMV BLD AUTO: 10.5 FL (ref 8.1–13.5)
POTASSIUM SERPL-SCNC: 3.8 MMOL/L (ref 3.7–5.3)
RBC # BLD AUTO: 4.35 M/UL (ref 4.21–5.77)
SODIUM SERPL-SCNC: 140 MMOL/L (ref 135–144)
WBC OTHER # BLD: 12.7 K/UL (ref 3.5–11.3)

## 2023-08-04 PROCEDURE — 85025 COMPLETE CBC W/AUTO DIFF WBC: CPT

## 2023-08-04 PROCEDURE — 96365 THER/PROPH/DIAG IV INF INIT: CPT

## 2023-08-04 PROCEDURE — 6360000004 HC RX CONTRAST MEDICATION: Performed by: STUDENT IN AN ORGANIZED HEALTH CARE EDUCATION/TRAINING PROGRAM

## 2023-08-04 PROCEDURE — 2580000003 HC RX 258: Performed by: STUDENT IN AN ORGANIZED HEALTH CARE EDUCATION/TRAINING PROGRAM

## 2023-08-04 PROCEDURE — 96375 TX/PRO/DX INJ NEW DRUG ADDON: CPT

## 2023-08-04 PROCEDURE — 70481 CT ORBIT/EAR/FOSSA W/DYE: CPT

## 2023-08-04 PROCEDURE — 6360000002 HC RX W HCPCS: Performed by: STUDENT IN AN ORGANIZED HEALTH CARE EDUCATION/TRAINING PROGRAM

## 2023-08-04 PROCEDURE — 80048 BASIC METABOLIC PNL TOTAL CA: CPT

## 2023-08-04 PROCEDURE — 99285 EMERGENCY DEPT VISIT HI MDM: CPT

## 2023-08-04 RX ORDER — KETOROLAC TROMETHAMINE 15 MG/ML
15 INJECTION, SOLUTION INTRAMUSCULAR; INTRAVENOUS ONCE
Status: COMPLETED | OUTPATIENT
Start: 2023-08-04 | End: 2023-08-04

## 2023-08-04 RX ADMIN — SODIUM CHLORIDE 3000 MG: 900 INJECTION INTRAVENOUS at 22:02

## 2023-08-04 RX ADMIN — KETOROLAC TROMETHAMINE 15 MG: 15 INJECTION, SOLUTION INTRAMUSCULAR; INTRAVENOUS at 21:50

## 2023-08-04 RX ADMIN — IOPAMIDOL 75 ML: 755 INJECTION, SOLUTION INTRAVENOUS at 22:48

## 2023-08-04 ASSESSMENT — PAIN SCALES - GENERAL
PAINLEVEL_OUTOF10: 10
PAINLEVEL_OUTOF10: 9

## 2023-08-04 ASSESSMENT — ENCOUNTER SYMPTOMS
COUGH: 0
SORE THROAT: 0
NAUSEA: 0
EYE PAIN: 0
RHINORRHEA: 1
VOMITING: 0
SHORTNESS OF BREATH: 0
ABDOMINAL PAIN: 0
TROUBLE SWALLOWING: 0
PHOTOPHOBIA: 0

## 2023-08-04 ASSESSMENT — PAIN DESCRIPTION - LOCATION
LOCATION: EYE
LOCATION: EYE

## 2023-08-04 ASSESSMENT — VISUAL ACUITY: OU: 1

## 2023-08-04 ASSESSMENT — PAIN DESCRIPTION - ORIENTATION
ORIENTATION: RIGHT
ORIENTATION: RIGHT

## 2023-08-04 ASSESSMENT — PAIN - FUNCTIONAL ASSESSMENT: PAIN_FUNCTIONAL_ASSESSMENT: 0-10

## 2023-08-05 PROCEDURE — 6370000000 HC RX 637 (ALT 250 FOR IP): Performed by: STUDENT IN AN ORGANIZED HEALTH CARE EDUCATION/TRAINING PROGRAM

## 2023-08-05 RX ORDER — IBUPROFEN 800 MG/1
800 TABLET ORAL EVERY 6 HOURS PRN
Qty: 21 TABLET | Refills: 0 | Status: SHIPPED | OUTPATIENT
Start: 2023-08-05

## 2023-08-05 RX ORDER — LEVOFLOXACIN 750 MG/1
750 TABLET ORAL ONCE
Status: COMPLETED | OUTPATIENT
Start: 2023-08-05 | End: 2023-08-05

## 2023-08-05 RX ORDER — LEVOFLOXACIN 750 MG/1
750 TABLET ORAL DAILY
Qty: 10 TABLET | Refills: 0 | Status: SHIPPED | OUTPATIENT
Start: 2023-08-05 | End: 2023-08-15

## 2023-08-05 RX ADMIN — LEVOFLOXACIN 750 MG: 750 TABLET, FILM COATED ORAL at 01:16

## 2023-08-05 NOTE — DISCHARGE INSTRUCTIONS
Please take the antibiotics as prescribed. We would like to recheck your wound in 48 hours, please return to the emergency department to ensure that the infection is getting better. Please follow with your primary care provider. Please return to the emergency department you develop any worsening or concerning symptoms.

## 2023-08-05 NOTE — ED NOTES
Patient back from [address] [place] [address], [address] [place] [address]  08/04/23 9327

## 2023-08-05 NOTE — ED NOTES
Report given to [facility], all questions answered      Shaye Kolb RN  08/04/23 5716

## 2023-08-05 NOTE — ED PROVIDER NOTES
[address] ED  Emergency Department Encounter  Emergency Medicine Resident     Pt Name:Meek Shi  MRN: [id number]  [address] [place] 1980  Date of evaluation: 8/4/23  PCP:  Pcp No  Note Started: 9:46 PM EDT      CHIEF COMPLAINT       Chief Complaint   Patient presents with    Facial Swelling       HISTORY OF PRESENT ILLNESS  (Location/Symptom, Timing/Onset, Context/Setting, Quality, Duration, Modifying Factors, Severity.)      Robe Richard is a 44 y.o. male who presents with complaints of right-sided periorbital swelling and pain. Seen in ED 7/18/2023 for similar complaint, found to have periorbital cellulitis with CT scan at that time also finding right-sided maxillary sinusitis. Patient was treated with Augmentin x7 days, states symptoms improved within a few days and has not had an issue until today. States he does still get intermittent congestion and rhinorrhea, however states this is common for him as he is around dust frequently with his work. States earlier today noticing small amount of swelling around his right eye again, went to another ED but felt the wait time was too long so came here, states in the couple hours he was waiting the eye became progressively more swollen. States pain is 9/10 in intensity to upper eyelid and eyebrow area. Denies pain with ocular movements, does report mildly blurry vision but attributes this to tearing as it improves when he wipes his eyes. Denies fever, chills, nausea, vomiting, chest pain, shortness of breath, cough, abdominal pain. Additionally complaining of pain to right lower molar, had tooth break a couple months ago and has had intermittent dental pain since then. He is set up to see a dentist in a couple weeks. Patient was previously referred to ENT for follow-up, however was told by them that they would not be able to see him until end of September.     PAST MEDICAL / SURGICAL / SOCIAL / FAMILY HISTORY      has a past medical history of Asthma and Spontaneous pneumothorax.       has a past surgical history that includes Hand surgery. Social History     Socioeconomic History    Marital status: Single     Spouse name: Not on file    Number of children: Not on file    Years of education: Not on file    Highest education level: Not on file   Occupational History    Not on file   Tobacco Use    Smoking status: Every Day     Packs/day: 1.00     Types: Cigarettes    Smokeless tobacco: Not on file   Substance and Sexual Activity    Alcohol use: No    Drug use: Yes     Types: Marijuana Violet Callander)    Sexual activity: Not on file   Other Topics Concern    Not on file   Social History Narrative    Not on file     Social Determinants of Health     Financial Resource Strain: Not on file   Food Insecurity: Not on file   Transportation Needs: Not on file   Physical Activity: Not on file   Stress: Not on file   Social Connections: Not on file   Intimate Partner Violence: Not on file   Housing Stability: Not on file       Family History   Adopted: Yes   Problem Relation Age of Onset    Kidney stones Mother     Lymphoma Mother     Suicide Father        Allergies:  Patient has no known allergies. Home Medications:  Prior to Admission medications    Medication Sig Start Date End Date Taking? Authorizing Provider   levoFLOXacin (LEVAQUIN) 750 MG tablet Take 1 tablet by mouth daily for 10 days 8/5/23 8/15/23 Yes Tiara Bhatia DO   ibuprofen (IBU) 800 MG tablet Take 1 tablet by mouth every 6 hours as needed for Pain 8/5/23  Yes Tiara Bhatia DO         REVIEW OF SYSTEMS       Review of Systems   Constitutional:  Negative for chills and fever. HENT:  Positive for congestion, dental problem and rhinorrhea. Negative for ear discharge, ear pain, sore throat and trouble swallowing. Eyes:  Positive for visual disturbance (Slight blurring to R eye that improves when eye is wiped). Negative for photophobia and pain.         Swelling to upper

## 2023-08-05 NOTE — ED NOTES
Patient presents today for periorbital cellulitis of right eye. He notes that his symptoms started today. He reports similar symptoms 3 weeks ago, but states that he did not have pain previously. He reports blurred vision. He also feels that his symptoms are spreading to the right upper area of his head. Patient denies any other concerns for today. Patient is alert and oriented X4. Vitals obtained. Will continue to monitor.       Ashely Osborn RN  08/04/23 8816

## 2023-08-07 ENCOUNTER — HOSPITAL ENCOUNTER (EMERGENCY)
Age: 43
Discharge: HOME | End: 2023-08-07
Attending: EMERGENCY MEDICINE

## 2023-08-07 VITALS
OXYGEN SATURATION: 96 % | HEART RATE: 105 BPM | WEIGHT: 174 LBS | DIASTOLIC BLOOD PRESSURE: 81 MMHG | RESPIRATION RATE: 16 BRPM | BODY MASS INDEX: 22.34 KG/M2 | SYSTOLIC BLOOD PRESSURE: 105 MMHG | TEMPERATURE: 98.5 F

## 2023-08-07 DIAGNOSIS — L03.213 PERIORBITAL CELLULITIS OF RIGHT EYE: Primary | ICD-10-CM

## 2023-08-07 DIAGNOSIS — J01.40 ACUTE PANSINUSITIS, RECURRENCE NOT SPECIFIED: ICD-10-CM

## 2023-08-07 PROCEDURE — 99283 EMERGENCY DEPT VISIT LOW MDM: CPT

## 2023-08-07 RX ORDER — PSEUDOEPHEDRINE HCL 30 MG
30 TABLET ORAL EVERY 6 HOURS PRN
Qty: 20 TABLET | Refills: 0 | Status: SHIPPED | OUTPATIENT
Start: 2023-08-07 | End: 2023-08-12

## 2023-08-07 ASSESSMENT — ENCOUNTER SYMPTOMS
NAUSEA: 0
SINUS PRESSURE: 1
RHINORRHEA: 1
SINUS PAIN: 1
TROUBLE SWALLOWING: 0
FACIAL SWELLING: 1
PHOTOPHOBIA: 0
COUGH: 0
SHORTNESS OF BREATH: 0
SORE THROAT: 0
EYE PAIN: 0
ABDOMINAL PAIN: 0
VOMITING: 0
SHORTNESS OF BREATH: 0
SORE THROAT: 0
EYE REDNESS: 0
VOMITING: 0
PHOTOPHOBIA: 0
EYE PAIN: 0
TROUBLE SWALLOWING: 0
NAUSEA: 0
COUGH: 0
ABDOMINAL PAIN: 0

## 2023-08-07 ASSESSMENT — VISUAL ACUITY: OU: 1

## 2023-08-07 ASSESSMENT — PAIN - FUNCTIONAL ASSESSMENT: PAIN_FUNCTIONAL_ASSESSMENT: NONE - DENIES PAIN

## 2023-08-07 NOTE — ED PROVIDER NOTES
[address] ED  Emergency Department Encounter  Emergency Medicine Resident     Pt Name:Meek Shi  MRN: [id number]  [address] [facility] 1980  Date of evaluation: 8/7/23  PCP:  Pcp No  Note Started: 5:56 PM EDT      CHIEF COMPLAINT       Chief Complaint   Patient presents with    Facial Swelling     Right eye       HISTORY OF PRESENT ILLNESS  (Location/Symptom, Timing/Onset, Context/Setting, Quality, Duration, Modifying Factors, Severity.)      Rolando Lee is a 44 y.o. male who presents for recheck of R sided periorbital cellulitis with R sided pansinusitis. Patient seen in ED by me 2 days ago for same, was seen 2.5 weeks prior to this for same and treated with Augmentin at that time. Given recurrence patient was discharged on extended duration Levofloxacin 2 days ago. Patient notes some improvement in swelling but still present. States headache has mildly improved but still present. Denies acute changes in vision, pain with ocular movement, fever, chills, nausea, vomiting, neck/throat pain, CP, SOB, abd pain. Has f/u with ENT, however this will not be until end of Sept per patient. PAST MEDICAL / SURGICAL / SOCIAL / FAMILY HISTORY      has a past medical history of Asthma and Spontaneous pneumothorax.       has a past surgical history that includes Hand surgery.       Social History     Socioeconomic History    Marital status: Single     Spouse name: Not on file    Number of children: Not on file    Years of education: Not on file    Highest education level: Not on file   Occupational History    Not on file   Tobacco Use    Smoking status: Every Day     Packs/day: 1.00     Types: Cigarettes    Smokeless tobacco: Not on file   Substance and Sexual Activity    Alcohol use: No    Drug use: Yes     Types: Marijuana Laila Callander)    Sexual activity: Not on file   Other Topics Concern    Not on file   Social History Narrative    Not on file     Social Determinants of Health     Financial

## 2023-08-27 ENCOUNTER — HOSPITAL ENCOUNTER (EMERGENCY)
Age: 43
Discharge: HOME | End: 2023-08-27
Attending: EMERGENCY MEDICINE

## 2023-08-27 VITALS
DIASTOLIC BLOOD PRESSURE: 63 MMHG | BODY MASS INDEX: 22.47 KG/M2 | SYSTOLIC BLOOD PRESSURE: 98 MMHG | RESPIRATION RATE: 16 BRPM | OXYGEN SATURATION: 99 % | WEIGHT: 175 LBS | HEART RATE: 54 BPM | TEMPERATURE: 97.2 F

## 2023-08-27 DIAGNOSIS — L03.213 PRESEPTAL CELLULITIS: Primary | ICD-10-CM

## 2023-08-27 PROCEDURE — 99283 EMERGENCY DEPT VISIT LOW MDM: CPT

## 2023-08-27 PROCEDURE — 6370000000 HC RX 637 (ALT 250 FOR IP): Performed by: STUDENT IN AN ORGANIZED HEALTH CARE EDUCATION/TRAINING PROGRAM

## 2023-08-27 RX ORDER — SULFAMETHOXAZOLE AND TRIMETHOPRIM 800; 160 MG/1; MG/1
1 TABLET ORAL ONCE
Status: COMPLETED | OUTPATIENT
Start: 2023-08-27 | End: 2023-08-27

## 2023-08-27 RX ORDER — SULFAMETHOXAZOLE AND TRIMETHOPRIM 800; 160 MG/1; MG/1
1 TABLET ORAL 2 TIMES DAILY
Qty: 14 TABLET | Refills: 0 | Status: SHIPPED | OUTPATIENT
Start: 2023-08-27 | End: 2023-09-03

## 2023-08-27 RX ORDER — AMOXICILLIN AND CLAVULANATE POTASSIUM 875; 125 MG/1; MG/1
1 TABLET, FILM COATED ORAL ONCE
Status: COMPLETED | OUTPATIENT
Start: 2023-08-27 | End: 2023-08-27

## 2023-08-27 RX ORDER — AMOXICILLIN AND CLAVULANATE POTASSIUM 875; 125 MG/1; MG/1
1 TABLET, FILM COATED ORAL 2 TIMES DAILY
Qty: 14 TABLET | Refills: 0 | Status: SHIPPED | OUTPATIENT
Start: 2023-08-27 | End: 2023-09-03

## 2023-08-27 RX ADMIN — AMOXICILLIN AND CLAVULANATE POTASSIUM 1 TABLET: 875; 125 TABLET, FILM COATED ORAL at 02:05

## 2023-08-27 RX ADMIN — SULFAMETHOXAZOLE AND TRIMETHOPRIM 1 TABLET: 800; 160 TABLET ORAL at 02:05

## 2023-08-27 ASSESSMENT — PAIN SCALES - GENERAL: PAINLEVEL_OUTOF10: 1

## 2023-08-27 ASSESSMENT — ENCOUNTER SYMPTOMS
ABDOMINAL PAIN: 0
SHORTNESS OF BREATH: 0
PHOTOPHOBIA: 0
CHEST TIGHTNESS: 0
COLOR CHANGE: 0
DIARRHEA: 0
TROUBLE SWALLOWING: 0
VOMITING: 0
EYE REDNESS: 0
BACK PAIN: 0
COUGH: 0
NAUSEA: 0

## 2023-08-27 ASSESSMENT — PAIN - FUNCTIONAL ASSESSMENT: PAIN_FUNCTIONAL_ASSESSMENT: 0-10

## 2023-08-27 NOTE — ED PROVIDER NOTES
[facility] ED  Emergency Department Encounter  Emergency Medicine Resident     Pt Name:Meek Shi  MRN: [id number]  [address] [facility] [place] 1980  Date of evaluation: 8/27/23  PCP:  Pcp No  Note Started: 1:10 AM EDT      CHIEF COMPLAINT       Chief Complaint   Patient presents with    Facial Swelling     Right eye       HISTORY OF PRESENT ILLNESS  (Location/Symptom, Timing/Onset, Context/Setting, Quality, Duration, Modifying Factors, Severity.)      Shabnam Sheikh is a 44 y.o. male who presents with right eyelid swelling. Patient states that he was seen earlier this month, chart review patient was treated with fluoroquinolone antibiotic, he has recurrence of eyelid swelling. Consistent with preseptal cellulitis diagnosed on previous ER visit. Patient did have a CT orbits performed at that time, no concern for orbital cellulitis at that time. Patient has a picture in his phone which appears more severe than his presenting symptoms today. Picture is in the patient's chart. Patient's not having any fevers or chills, no nausea vomiting. Vital signs within normal limits. PAST MEDICAL / SURGICAL / SOCIAL / FAMILY HISTORY      has a past medical history of Asthma and Spontaneous pneumothorax.       has a past surgical history that includes Hand surgery.       Social History     Socioeconomic History    Marital status: Single     Spouse name: Not on file    Number of children: Not on file    Years of education: Not on file    Highest education level: Not on file   Occupational History    Not on file   Tobacco Use    Smoking status: Every Day     Packs/day: 1.00     Types: Cigarettes    Smokeless tobacco: Not on file   Substance and Sexual Activity    Alcohol use: No    Drug use: Yes     Types: Marijuana Viola Katia)    Sexual activity: Not on file   Other Topics Concern    Not on file   Social History Narrative    Not on file     Social Determinants of Health     Financial Resource Strain: Not on file

## 2023-08-27 NOTE — DISCHARGE INSTRUCTIONS
You have been seen in the ER today for eye swelling. If you begin to experience any symptoms such as chest pain shortness of breath nausea vomiting dizziness drowsiness abdominal pain loss of consciousness or any other symptoms you find concerning please return to the ED for follow-up evaluation. If you have been given pain medication please take them only as prescribed. Do not take more medication than prescribed at any given time. Please follow-up with your primary care provider within 3-5 days for continued care, sooner if you have concerns.

## 2023-08-27 NOTE — ED TRIAGE NOTES
Pt presents to ED with right eye swelling. Pt states he has been treated for periorbital cellulitis. Pt reports mild pain, denies injuries, is alert and oriented.

## 2023-08-28 RX ORDER — IBUPROFEN 800 MG/1
TABLET ORAL
Qty: 21 TABLET | Refills: 0 | OUTPATIENT
Start: 2023-08-28

## 2023-10-25 ENCOUNTER — OFFICE VISIT (OUTPATIENT)
Dept: FAMILY MEDICINE CLINIC | Age: 43
End: 2023-10-25
Payer: COMMERCIAL

## 2023-10-25 VITALS
DIASTOLIC BLOOD PRESSURE: 66 MMHG | SYSTOLIC BLOOD PRESSURE: 110 MMHG | HEART RATE: 86 BPM | WEIGHT: 183 LBS | HEIGHT: 74 IN | BODY MASS INDEX: 23.49 KG/M2 | OXYGEN SATURATION: 97 %

## 2023-10-25 DIAGNOSIS — Z00.00 WELL ADULT EXAM: ICD-10-CM

## 2023-10-25 DIAGNOSIS — Z76.89 ESTABLISHING CARE WITH NEW DOCTOR, ENCOUNTER FOR: Primary | ICD-10-CM

## 2023-10-25 DIAGNOSIS — H05.011 ORBITAL ABSCESS, RIGHT: ICD-10-CM

## 2023-10-25 DIAGNOSIS — R00.1 BRADYCARDIA: ICD-10-CM

## 2023-10-25 PROCEDURE — 99386 PREV VISIT NEW AGE 40-64: CPT | Performed by: FAMILY MEDICINE

## 2023-10-25 RX ORDER — ERYTHROMYCIN 5 MG/G
1 OINTMENT OPHTHALMIC 2 TIMES DAILY
COMMUNITY
Start: 2023-10-08

## 2023-10-25 RX ORDER — AMOXICILLIN AND CLAVULANATE POTASSIUM 875; 125 MG/1; MG/1
1 TABLET, FILM COATED ORAL 2 TIMES DAILY
Qty: 60 TABLET | Refills: 0 | COMMUNITY
Start: 2023-10-08 | End: 2023-11-07

## 2023-10-25 RX ORDER — METRONIDAZOLE 500 MG/1
500 TABLET ORAL 2 TIMES DAILY
Qty: 60 TABLET | Refills: 0 | COMMUNITY
Start: 2023-10-08 | End: 2023-11-07

## 2023-10-25 SDOH — ECONOMIC STABILITY: INCOME INSECURITY: HOW HARD IS IT FOR YOU TO PAY FOR THE VERY BASICS LIKE FOOD, HOUSING, MEDICAL CARE, AND HEATING?: NOT VERY HARD

## 2023-10-25 SDOH — ECONOMIC STABILITY: HOUSING INSECURITY
IN THE LAST 12 MONTHS, WAS THERE A TIME WHEN YOU DID NOT HAVE A STEADY PLACE TO SLEEP OR SLEPT IN A SHELTER (INCLUDING NOW)?: NO

## 2023-10-25 SDOH — ECONOMIC STABILITY: FOOD INSECURITY: WITHIN THE PAST 12 MONTHS, THE FOOD YOU BOUGHT JUST DIDN'T LAST AND YOU DIDN'T HAVE MONEY TO GET MORE.: NEVER TRUE

## 2023-10-25 SDOH — ECONOMIC STABILITY: FOOD INSECURITY: WITHIN THE PAST 12 MONTHS, YOU WORRIED THAT YOUR FOOD WOULD RUN OUT BEFORE YOU GOT MONEY TO BUY MORE.: NEVER TRUE

## 2023-10-25 ASSESSMENT — PATIENT HEALTH QUESTIONNAIRE - PHQ9
SUM OF ALL RESPONSES TO PHQ QUESTIONS 1-9: 0
SUM OF ALL RESPONSES TO PHQ QUESTIONS 1-9: 0
SUM OF ALL RESPONSES TO PHQ9 QUESTIONS 1 & 2: 0
2. FEELING DOWN, DEPRESSED OR HOPELESS: 0
SUM OF ALL RESPONSES TO PHQ QUESTIONS 1-9: 0
1. LITTLE INTEREST OR PLEASURE IN DOING THINGS: 0
SUM OF ALL RESPONSES TO PHQ QUESTIONS 1-9: 0

## 2023-10-25 NOTE — PROGRESS NOTES
[address] [address]  Dept: [phone]      Ebenezer Scott is a 44 y.o. male who presents today for follow up on his  medical conditions as noted below. Chief Complaint   Patient presents with    New Patient       Patient Active Problem List:     Bradycardia     Nicotine dependence, cigarettes, uncomplicated     COVID     Spontaneous pneumothorax     History of syncope     Past Medical History:   Diagnosis Date    Asthma     childhood, no current treatment    Spontaneous pneumothorax     Left      Past Surgical History:   Procedure Laterality Date    HAND SURGERY       Family History   Adopted: Yes   Problem Relation Age of Onset    Kidney stones Mother     Lymphoma Mother     Suicide Father        Current Outpatient Medications   Medication Sig Dispense Refill    erythromycin (ROMYCIN) 5 MG/GM ophthalmic ointment 1 Application 2 times daily      metroNIDAZOLE (FLAGYL) 500 MG tablet Take 1 tablet by mouth 2 times daily 60 tablet 0    amoxicillin-clavulanate (AUGMENTIN) 875-125 MG per tablet Take 1 tablet by mouth 2 times daily 60 tablet 0    ibuprofen (IBU) 800 MG tablet Take 1 tablet by mouth every 6 hours as needed for Pain (Patient not taking: Reported on 10/25/2023) 21 tablet 0     No current facility-administered medications for this visit.      ALLERGIES:  No Known Allergies    Social History     Tobacco Use    Smoking status: Every Day     Packs/day: 1     Types: Cigarettes    Smokeless tobacco: Not on file   Substance Use Topics    Alcohol use: No        BUN (mg/dL)   Date Value   08/04/2023 8     Creatinine (mg/dL)   Date Value   08/04/2023 0.7     Glucose (mg/dL)   Date Value   08/04/2023 109 (H)              Subjective:      HPI  Today as a new patient to establish care apparently he presented to the emergency room here in [facility] and was diagnosed with an orbital abscess was so significant that they

## 2023-10-26 ENCOUNTER — HOSPITAL ENCOUNTER (OUTPATIENT)
Age: 43
Discharge: HOME | End: 2023-10-26
Payer: COMMERCIAL

## 2023-10-26 DIAGNOSIS — Z00.00 WELL ADULT EXAM: ICD-10-CM

## 2023-10-26 DIAGNOSIS — R00.1 BRADYCARDIA: ICD-10-CM

## 2023-10-26 LAB
25(OH)D3 SERPL-MCNC: 35.1 NG/ML
CHOLEST SERPL-MCNC: 103 MG/DL
CHOLESTEROL/HDL RATIO: 2.5
HDLC SERPL-MCNC: 41 MG/DL
LDLC SERPL CALC-MCNC: 54 MG/DL (ref 0–130)
T4 FREE SERPL-MCNC: 1.1 NG/DL (ref 0.9–1.7)
TRIGL SERPL-MCNC: 40 MG/DL
TSH SERPL DL<=0.05 MIU/L-ACNC: 0.19 UIU/ML (ref 0.3–5)

## 2023-10-26 PROCEDURE — 84439 ASSAY OF FREE THYROXINE: CPT

## 2023-10-26 PROCEDURE — 36415 COLL VENOUS BLD VENIPUNCTURE: CPT

## 2023-10-26 PROCEDURE — 84443 ASSAY THYROID STIM HORMONE: CPT

## 2023-10-26 PROCEDURE — 80061 LIPID PANEL: CPT

## 2023-10-26 PROCEDURE — 82306 VITAMIN D 25 HYDROXY: CPT

## 2023-10-31 ENCOUNTER — HOSPITAL ENCOUNTER (OUTPATIENT)
Age: 43
Discharge: HOME | End: 2023-11-02
Attending: FAMILY MEDICINE
Payer: COMMERCIAL

## 2023-10-31 DIAGNOSIS — R00.1 BRADYCARDIA: ICD-10-CM

## 2023-10-31 PROCEDURE — 93225 XTRNL ECG REC<48 HRS REC: CPT

## 2023-11-08 ENCOUNTER — OFFICE VISIT (OUTPATIENT)
Dept: INFECTIOUS DISEASES | Age: 43
End: 2023-11-08
Payer: COMMERCIAL

## 2023-11-08 VITALS
WEIGHT: 185 LBS | OXYGEN SATURATION: 98 % | BODY MASS INDEX: 23.74 KG/M2 | RESPIRATION RATE: 14 BRPM | TEMPERATURE: 97.7 F | HEIGHT: 74 IN | SYSTOLIC BLOOD PRESSURE: 105 MMHG | HEART RATE: 58 BPM | DIASTOLIC BLOOD PRESSURE: 62 MMHG

## 2023-11-08 DIAGNOSIS — H05.011 ORBITAL CELLULITIS ON RIGHT: Primary | ICD-10-CM

## 2023-11-08 DIAGNOSIS — R19.7 DIARRHEA, UNSPECIFIED TYPE: ICD-10-CM

## 2023-11-08 DIAGNOSIS — J32.1 FRONTAL SINUSITIS, UNSPECIFIED CHRONICITY: ICD-10-CM

## 2023-11-08 DIAGNOSIS — H70.011: ICD-10-CM

## 2023-11-08 PROCEDURE — 99205 OFFICE O/P NEW HI 60 MIN: CPT | Performed by: INTERNAL MEDICINE

## 2023-11-08 RX ORDER — METRONIDAZOLE 500 MG/1
500 TABLET ORAL 3 TIMES DAILY
COMMUNITY

## 2023-11-08 RX ORDER — AMOXICILLIN AND CLAVULANATE POTASSIUM 500; 125 MG/1; MG/1
1 TABLET, FILM COATED ORAL 3 TIMES DAILY
COMMUNITY

## 2023-11-08 ASSESSMENT — ENCOUNTER SYMPTOMS
DIARRHEA: 1
RESPIRATORY NEGATIVE: 1

## 2023-11-08 NOTE — PROGRESS NOTES
[facility]   Office Consult Note  Today's Date and Time: 11/8/2023, 10:34 AM    Impression:     1. Orbital cellulitis on right    2. Frontal sinusitis, unspecified chronicity    3. Subperiosteal abscess of right mastoid    4. Diarrhea, unspecified type         Recommendations   The patient is still completing antimicrobial therapy with Augmentin and metronidazole. It is clear that he did miss some doses of his medications. At this point time is reporting new right-sided jaw pain as well as diarrhea. Concerns will be for C. difficile infection as well as residual odontogenic infection. At this point in time I have recommended that he has stool for C. difficile done, CT imaging of the orbits. Have asked him to complete the oral antimicrobial therapy and we will follow his progress and adjust therapy accordingly    I have ordered the following medications/ labs:  Orders Placed This Encounter   Procedures    Clostridium Difficile Toxin/Antigen     Standing Status:   Future     Standing Expiration Date:   11/8/2024    CT ORBITS W CONTRAST     Standing Status:   Future     Standing Expiration Date:   12/8/2023     Order Specific Question:   STAT Creatinine as needed:     Answer:   Yes     Order Specific Question:   Reason for exam:     Answer:   F/u abscess      No orders of the defined types were placed in this encounter. Chief complaint/reason for consultation:     Chief Complaint   Patient presents with    Frequent Infections     Orbital eye infection- Patient  had surgery on right eye at [facility] around 10/5. Labs in care everywhere. Patient has been on antibiotics for past 3 mths, recently on Flagyl and Augmentin and states he is now having diarrhea. Patient complaint of jaw pain on right side and sinus drainage which is green.   He did have infected tooth extracted on upper right in July/August.          History of Present Illness:   Lucy Brewer is a 44y.o.-year-old male who is

## 2023-11-15 ENCOUNTER — HOSPITAL ENCOUNTER (OUTPATIENT)
Dept: CT IMAGING | Age: 43
Discharge: HOME | End: 2023-11-17
Attending: INTERNAL MEDICINE
Payer: COMMERCIAL

## 2023-11-15 DIAGNOSIS — J32.1 FRONTAL SINUSITIS, UNSPECIFIED CHRONICITY: ICD-10-CM

## 2023-11-15 DIAGNOSIS — H70.011: ICD-10-CM

## 2023-11-15 DIAGNOSIS — H05.011 ORBITAL CELLULITIS ON RIGHT: ICD-10-CM

## 2023-11-15 PROCEDURE — 6360000004 HC RX CONTRAST MEDICATION: Performed by: INTERNAL MEDICINE

## 2023-11-15 PROCEDURE — 70481 CT ORBIT/EAR/FOSSA W/DYE: CPT

## 2023-11-15 RX ADMIN — IOPAMIDOL 75 ML: 755 INJECTION, SOLUTION INTRAVENOUS at 11:09

## 2023-11-17 DIAGNOSIS — R00.1 BRADYCARDIA: Primary | ICD-10-CM

## 2023-11-22 ENCOUNTER — OFFICE VISIT (OUTPATIENT)
Dept: INFECTIOUS DISEASES | Age: 43
End: 2023-11-22
Payer: COMMERCIAL

## 2023-11-22 VITALS
DIASTOLIC BLOOD PRESSURE: 61 MMHG | SYSTOLIC BLOOD PRESSURE: 110 MMHG | HEIGHT: 74 IN | BODY MASS INDEX: 24.72 KG/M2 | HEART RATE: 57 BPM | TEMPERATURE: 97.4 F | WEIGHT: 192.6 LBS

## 2023-11-22 DIAGNOSIS — J32.1 CHRONIC FRONTAL SINUSITIS: ICD-10-CM

## 2023-11-22 DIAGNOSIS — J32.2 CHRONIC ETHMOIDAL SINUSITIS: ICD-10-CM

## 2023-11-22 DIAGNOSIS — J32.0 CHRONIC MAXILLARY SINUSITIS: Primary | ICD-10-CM

## 2023-11-22 PROCEDURE — 99214 OFFICE O/P EST MOD 30 MIN: CPT | Performed by: INTERNAL MEDICINE

## 2023-11-22 ASSESSMENT — ENCOUNTER SYMPTOMS
SINUS PAIN: 1
DIARRHEA: 0
GASTROINTESTINAL NEGATIVE: 1
RESPIRATORY NEGATIVE: 1

## 2023-11-22 NOTE — PROGRESS NOTES
Intensivist Progress Note


Assessment/Plan: 


Assessment:





Hypernatremia:  Sodium 148 today


.


Hyperglycemia:  Glucoses remain in 200s. On insulin SSI and Lantus.  Will 

increase the latter.





Influenza:  Improving.  Likely contributes to hypoxemia, delirium.





Hypoxemic respiratory failure: Due to bilateral pneumonia/atelectasis. 

Multifactorial, with fluid shifts, Influenza, atelectasis, possible aspiration. 

Was on Vapotherm, now doing well with nasal cannula at 6L.





Hemodynamics: Hypotension improved, almost off NE. HR normal.





Nutrition: SBFT placed 3/2. On TF.





Tachycradia: Frequent atrial ectopy on ECG.


 


Delirium: Persists, but improving and somewhat more alert today.  Multifactorial

:  Toxic metabolic.  Neurologic consultation appreciated.











Plan:  Continue care in the intensive care unit.  Continue supplemental oxygen.

  Continue antibiotics:  Cefepime and Flagyl.  Continue metoprolol PRN.  

Increase fee H2O, may need Lasix if I>O. Increase Lantus and continue frequent 

SSI checks.  Continue Melatonin and Precedex at night for sleep and agitation.  

Hold Precedex during the day and increase stimulation/orientation as possible. 

Follow Na, lab, CXR.  Began to look at disposition/discharge issues.








35 min critical care time spent directly with the patient.  Discussed with 

nursing, hospitalist, multi disciplinary team.











Subjective: 





Restless, mitts in place.  Looks to voice.  Will not answer questions for me or 

follow commands.


Objective: 





 Vital Signs











Temp Pulse Resp BP Pulse Ox


 


 37.8 C   127 H  32 H  129/56 H  92 


 


 03/05/18 12:00  03/05/18 12:26  03/05/18 12:00  03/05/18 12:26  03/05/18 12:00








 Microbiology











 03/04/18 19:00  - Final





 Sputum, Expectorated 








 Laboratory Results





 03/05/18 05:50 





 03/05/18 11:50 





 











 03/04/18 03/05/18 03/06/18





 05:59 05:59 05:59


 


Intake Total 2955.3 3512.1 


 


Output Total 2100 2695 360


 


Balance 855.3 817.1 -360








 











PT  16.9 SEC (12.0-15.0)  H  03/02/18  14:00    


 


INR  1.36  (0.83-1.16)  H  03/02/18  14:00    














Physical Exam





- Physical Exam


General Appearance: thin, other (Restless), No alert


EENT: PERRL/EOMI, other (Nasal cannula at 6 L)


Neck: normal inspection


Respiratory: lungs clear (Anteriorly), decreased breath sounds (At bases), 

rales (Bibasilar rales present), No rhonchi (Currently)


Cardiac/Chest: regular rate, rhythm, systolic murmur, extra beats


Abdomen: non-tender, soft, No normal bowel sounds (Decreased, present)


Male Genitalia: other (Villalobos catheter in place)


Skin: warm/dry, pallor


Extremities: pedal edema (Trace)


Neuro/Psych: no motor/sensory deficits (Moves all extremities equally), No 

cognition abnormalities (Remains obtunded, restless)





ICD10 Worksheet


Patient Problems: 


 Problems











Problem Status Onset


 


Non-ketotic hyperosmolar coma Acute  


 


Hypothermia Acute  


 


Atrial fibrillation Acute results found for: \"SEDRATE\"      Imaging Studies:   CT OF THE ORBIT WITH CONTRAST 11/15/2023  IMPRESSION:  Resolution of the right periorbital cellulitis. Chronic sinusitis involving the right frontal sinus, right ethmoid air cells,  and right maxillary sinus status postsurgical change. Specimen Collected: 11/17/23 15:12 EST Last Resulted: 11/17/23 15:25 EST           Cultures:   No new culture data    Thank you for allowing us to participate in the care of this patient. Pleasecall with questions. César Garcia MD  [employer] messaging: [phone]    This note is created with the assistance of a speech recognition program.  While intending to generate a document that actually reflects the content ofthe visit, the document can still have some errors including those of syntax and sound a like substitutions which may escape proof reading. It such instances, actual meaning can be extrapolated by contextual diversion.

## 2023-12-08 ENCOUNTER — TELEPHONE (OUTPATIENT)
Dept: FAMILY MEDICINE CLINIC | Age: 43
End: 2023-12-08

## 2023-12-08 NOTE — TELEPHONE ENCOUNTER
----- Message from Marques Hyman sent at 12/8/2023 11:20 AM EST -----  Subject: Referral Request    Reason for referral request? ENT Doctor   Provider patient wants to be referred to(if known):     Provider Phone Number(if known):     Additional Information for Provider? was told by infectious disease doctor   Randall Gallegos) to see ENT but needs referral.   ---------------------------------------------------------------------------  --------------  Karie Vinton Melissa    [phone]; OK to leave message on voicemail  ---------------------------------------------------------------------------  --------------

## 2023-12-08 NOTE — TELEPHONE ENCOUNTER
----- Message from Svitlana Oliva sent at 12/8/2023 11:17 AM EST -----  Subject: Message to Provider    QUESTIONS  Information for Provider? patient scheduled a VV for 12/12 for a ER follow   up. Patient prefers to so in office visit please call if able to change   appointment to in the office   ---------------------------------------------------------------------------  --------------  [address]  [phone]; OK to leave message on voicemail  ---------------------------------------------------------------------------  --------------  SCRIPT ANSWERS  Relationship to Patient?  Self

## 2023-12-15 ENCOUNTER — OFFICE VISIT (OUTPATIENT)
Dept: FAMILY MEDICINE CLINIC | Age: 43
End: 2023-12-15
Payer: COMMERCIAL

## 2023-12-15 VITALS
TEMPERATURE: 97.6 F | HEIGHT: 74 IN | DIASTOLIC BLOOD PRESSURE: 70 MMHG | WEIGHT: 190 LBS | SYSTOLIC BLOOD PRESSURE: 121 MMHG | OXYGEN SATURATION: 100 % | BODY MASS INDEX: 24.38 KG/M2 | HEART RATE: 78 BPM

## 2023-12-15 DIAGNOSIS — R93.0 ABNORMAL CT OF PARANASAL SINUSES: ICD-10-CM

## 2023-12-15 DIAGNOSIS — S33.5XXD LUMBAR SPRAIN, SUBSEQUENT ENCOUNTER: Primary | ICD-10-CM

## 2023-12-15 PROCEDURE — 99214 OFFICE O/P EST MOD 30 MIN: CPT | Performed by: FAMILY MEDICINE

## 2023-12-15 RX ORDER — AMOXICILLIN AND CLAVULANATE POTASSIUM 875; 125 MG/1; MG/1
1 TABLET, FILM COATED ORAL 2 TIMES DAILY
Qty: 20 TABLET | Refills: 0 | Status: SHIPPED | OUTPATIENT
Start: 2023-12-15 | End: 2023-12-25

## 2023-12-15 RX ORDER — IBUPROFEN 800 MG/1
800 TABLET ORAL EVERY 8 HOURS PRN
Qty: 30 TABLET | Refills: 1 | Status: SHIPPED | OUTPATIENT
Start: 2023-12-15

## 2023-12-15 RX ORDER — IBUPROFEN 600 MG/1
600 TABLET ORAL EVERY 6 HOURS PRN
COMMUNITY
Start: 2023-12-07

## 2023-12-15 RX ORDER — CYCLOBENZAPRINE HCL 10 MG
10 TABLET ORAL NIGHTLY
Qty: 30 TABLET | Refills: 0 | Status: SHIPPED | OUTPATIENT
Start: 2023-12-15 | End: 2024-01-14

## 2023-12-15 RX ORDER — LIDOCAINE 4 G/100G
1 PATCH TOPICAL DAILY
COMMUNITY
Start: 2023-12-07

## 2023-12-15 NOTE — PROGRESS NOTES
[address] [address]  Dept: [phone]      Swetha Goodman is a 44 y.o. male who presents today for follow up on his  medical conditions as noted below. Chief Complaint   Patient presents with    ED Follow-up     12/7/2023 Back Pain     Headache    Sinusitis       Patient Active Problem List:     Bradycardia     Nicotine dependence, cigarettes, uncomplicated     COVID     Spontaneous pneumothorax     History of syncope     Past Medical History:   Diagnosis Date    Asthma     childhood, no current treatment    Spontaneous pneumothorax     Left      Past Surgical History:   Procedure Laterality Date    EYE SURGERY Left 10/05/2023    infection of left eye    HAND SURGERY       Family History   Adopted: Yes   Problem Relation Age of Onset    Kidney stones Mother     Lymphoma Mother     Suicide Father        Current Outpatient Medications   Medication Sig Dispense Refill    GNP LIDOCAINE PAIN RELIEF 4 % external patch Place 1 patch onto the skin daily      ibuprofen (ADVIL;MOTRIN) 600 MG tablet Take 1 tablet by mouth every 6 hours as needed for Pain      cyclobenzaprine (FLEXERIL) 10 MG tablet Take 1 tablet by mouth nightly 30 tablet 0    ibuprofen (IBU) 800 MG tablet Take 1 tablet by mouth every 8 hours as needed for Pain Take with food. 30 tablet 1    amoxicillin-clavulanate (AUGMENTIN) 875-125 MG per tablet Take 1 tablet by mouth 2 times daily for 10 days 20 tablet 0     No current facility-administered medications for this visit.      ALLERGIES:  No Known Allergies    Social History     Tobacco Use    Smoking status: Every Day     Packs/day: 1.00     Years: 22.00     Additional pack years: 0.00     Total pack years: 22.00     Types: Cigarettes    Smokeless tobacco: Not on file    Tobacco comments:     1 pack daily   Substance Use Topics    Alcohol use: No        LDL Cholesterol (mg/dL)   Date Value   10/26/2023 54

## 2024-01-09 RX ORDER — SODIUM CHLORIDE, SODIUM LACTATE, POTASSIUM CHLORIDE, CALCIUM CHLORIDE 600; 310; 30; 20 MG/100ML; MG/100ML; MG/100ML; MG/100ML
INJECTION, SOLUTION INTRAVENOUS CONTINUOUS
OUTPATIENT
Start: 2024-01-09

## 2024-01-09 NOTE — DISCHARGE INSTRUCTIONS
Preoperative Instructions:    Stop eating solid foods at midnight the night prior to surgery.    Stop drinking clear liquids at midnight the night prior to surgery.    Arrive at the surgery center (Entrance B) by 6:30 AM on 1/24/2024  (or as directed by your surgeon's office).    Please stop any blood thinning medications as directed by your surgeon or prescribing physician. Failure to stop certain medications may interfere with your scheduled surgery.    These may include:  Aspirin, Warfarin (Coumadin), Clopidogrel (Plavix), Ibuprofen (Motrin, Advil), Naproxen (Aleve), Meloxicam (Mobic), Celecoxib (Celebrex), Eliquis, Pradaxa, Xarelto, Effient, Fish Oil, Herbal supplements.    You may continue the rest of your medications through the night before surgery unless instructed otherwise.    Please take only the following medication(s) the day of surgery with a small sip of water:    Please use and bring inhalers the day of surgery.  Please bring CPAP the day of surgery.    PLEASE NOTE:  THE ABOVE (IF ANY) DISCONTINUED MEDS MAY ONLY BE FROM   \"CLEANING UP\" THE MED LIST AND WERE NOT ACTUALLY CANCELLED; SEE CHART FOR DETAILS AND ALWAYS CHECK WITH PRESCRIBING PROVIDER BEFORE DISCONTINUING ANY MEDICATIONS          ____________________________  ____________________________  Signature (Patient)           Signature/date(Provider)      REMINDERS:  ** If you are going home the day of your procedure, you will need a friend or family member to drive you home after your procedure.  Your  must be 18 years of age or older and able to sign off on your discharge instructions.  Taxi cabs or any form of public transportation is not acceptable.    ** It is preferable that the friend or family member stay at the hospital throughout your procedure.  ** If you are going home the same day as your procedure, someone must remain with you for the first 24 hours after your surgery if you receive anesthesia or sedation.  If you do not have

## 2024-01-12 ENCOUNTER — HOSPITAL ENCOUNTER (OUTPATIENT)
Dept: PREADMISSION TESTING | Age: 44
End: 2024-01-12
Payer: COMMERCIAL

## 2024-01-12 VITALS
BODY MASS INDEX: 23.61 KG/M2 | HEIGHT: 74 IN | SYSTOLIC BLOOD PRESSURE: 113 MMHG | TEMPERATURE: 97.9 F | DIASTOLIC BLOOD PRESSURE: 71 MMHG | HEART RATE: 67 BPM | WEIGHT: 184 LBS | RESPIRATION RATE: 20 BRPM | OXYGEN SATURATION: 99 %

## 2024-01-12 LAB
ANION GAP SERPL CALCULATED.3IONS-SCNC: 8 MMOL/L (ref 9–17)
BUN SERPL-MCNC: 11 MG/DL (ref 6–20)
CHLORIDE SERPL-SCNC: 105 MMOL/L (ref 98–107)
CO2 SERPL-SCNC: 26 MMOL/L (ref 20–31)
CREAT SERPL-MCNC: 0.7 MG/DL (ref 0.7–1.2)
ERYTHROCYTE [DISTWIDTH] IN BLOOD BY AUTOMATED COUNT: 12.7 % (ref 11.8–14.4)
GFR SERPL CREATININE-BSD FRML MDRD: >60 ML/MIN/1.73M2
GLUCOSE SERPL-MCNC: 95 MG/DL (ref 70–99)
HCT VFR BLD AUTO: 43.8 % (ref 40.7–50.3)
HGB BLD-MCNC: 14.4 G/DL (ref 13–17)
MCH RBC QN AUTO: 30.4 PG (ref 25.2–33.5)
MCHC RBC AUTO-ENTMCNC: 32.9 G/DL (ref 28.4–34.8)
MCV RBC AUTO: 92.4 FL (ref 82.6–102.9)
NRBC BLD-RTO: 0 PER 100 WBC
PLATELET # BLD AUTO: 217 K/UL (ref 138–453)
PMV BLD AUTO: 10.3 FL (ref 8.1–13.5)
POTASSIUM SERPL-SCNC: 4 MMOL/L (ref 3.7–5.3)
RBC # BLD AUTO: 4.74 M/UL (ref 4.21–5.77)
SODIUM SERPL-SCNC: 139 MMOL/L (ref 135–144)
WBC OTHER # BLD: 10 K/UL (ref 3.5–11.3)

## 2024-01-12 PROCEDURE — 85027 COMPLETE CBC AUTOMATED: CPT

## 2024-01-12 PROCEDURE — 84520 ASSAY OF UREA NITROGEN: CPT

## 2024-01-12 PROCEDURE — 93005 ELECTROCARDIOGRAM TRACING: CPT | Performed by: ANESTHESIOLOGY

## 2024-01-12 PROCEDURE — 82947 ASSAY GLUCOSE BLOOD QUANT: CPT

## 2024-01-12 PROCEDURE — 80051 ELECTROLYTE PANEL: CPT

## 2024-01-12 PROCEDURE — 82565 ASSAY OF CREATININE: CPT

## 2024-01-12 PROCEDURE — 36415 COLL VENOUS BLD VENIPUNCTURE: CPT

## 2024-01-12 NOTE — H&P
grossly intact. Gait is smooth.  Testing:   [id number]  Labs pending: drawn 2024   IMPRESSIONS:   Chronic sinusitis.  PLANS:   FUNCTIONAL ENDOSCOPIC SINUS SURGERY IMAGE GUIDED.      ALBERT Black CNP  Electronically signed 2024 at 3:40 PM

## 2024-01-12 NOTE — PROGRESS NOTES
Anesthesia Focused Assessment    Hx of anesthesia complications:  no  Family hx of anesthesia complications:  no      Tested positive for Covid-19 in last 8 weeks: no    STOP-BANG Sleep Apnea Questionnaire    SNORE loudly (heard through closed doors)?   No  TIRED, fatigued, sleepy during daytime?    No  OBSERVED stopping breathing during sleep?   No  High blood PRESSURE or being treated?    No    BMI over 35?        No  AGE over 50?        No  NECK circumference over 16\"?     No  GENDER (male)?       Yes             Total 1  High risk 5-8  Intermediate risk 3-4  Low risk 0-2    ----------------------------------------------------------------------------------------------------------------------  MARQUEZ                              No  If yes, machine?          DM1                                            No  DM2                   No    Coronary Artery Disease      No  HTN         No  Defib/AICD/Pacemaker               No             Renal Failure                   No  If yes, on dialysis           Active smoker?       Yes, 1 ppd   Drinks alcohol?       No  Illicit drugs?        Yes, THC daily  Dentition?        benign      Past Medical History:   Diagnosis Date    Abscess, mastoid, subperiosteal, right 10/2023    ADHD     As a kid    Asthma     Childhood.  Denies difficulty since then.  No current treatment.    Bipolar disorder (HCC)     \"Diagnosed when I was younger but I worked through it.\"    Bradycardia 10/2022    ER visit . Asymptomatic.    COVID-19 10/01/2022    Lumbar sprain 12/07/2023    ER visit    Major depression     Orbital cellulitis on right 10/2023    Poor dentition 01/12/2024    States that dentist said that he needs all teeth removed.    Spontaneous pneumothorax     Left    Under care of team     PCP - Dr. Mcpherson - last visit 12/15/2023    Under care of team     Infectious Disease - Dr. Arora - last visit 11/8/2023    Under care of team     Otolaryngology - Dr. Baker - last visit 12/19/2023

## 2024-01-12 NOTE — H&P (VIEW-ONLY)
History and Physical    Pt Name: Meek Shi  MRN: [id number]  YOB: 1980  Date of evaluation: 1/12/2024  Primary Care Physician: Macey Mcpherson DO    SUBJECTIVE:   History of Chief Complaint:    Meek Shi is a 43 y.o. male who presents for PAT appointment. Patient complains of chronic nasal congestion, purulent nasal drainage for months. Patient had maxillary antrostomy and sinusotomy 10/2023. Also history of orbital cellulitis, requiring multiple antibiotic courses. 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 He states since his surgery in October, he denies any orbital pain but reports blurred near vision. Patient has been scheduled for FUNCTIONAL ENDOSCOPIC SINUS SURGERY IMAGE GUIDED.  Allergies  has No Known Allergies.  Medications  Prior to Admission medications    Medication Sig Start Date End Date Taking? Authorizing Provider   GNP LIDOCAINE PAIN RELIEF 4 % external patch Place 1 patch onto the skin daily  Patient not taking: Reported on 1/12/2024 12/7/23   Provider, MD Tyesha   ibuprofen (ADVIL;MOTRIN) 600 MG tablet Take 1 tablet by mouth every 6 hours as needed for Pain 12/7/23   Provider, MD Tyesha     Past Medical History    has a past medical history of Abscess, mastoid, subperiosteal, right, ADHD, Asthma, Bipolar disorder (HCC), Bradycardia, COVID-19, Lumbar sprain, Major depression, Orbital cellulitis on right, Poor dentition, Spontaneous pneumothorax, Under care of team, Under care of team, Under care of team, and Under care of team.  Past Surgical History   has a past surgical history that includes Hand surgery (Right); Maxillary antrostomy (Right, 10/03/2023); and sinusotomy (10/03/2023).  Social History   reports that he has been smoking cigarettes. He started smoking about 22 years ago. He has a 44.1 pack-year smoking history. He does not have any smokeless tobacco history on file.    reports no history of alcohol use.    reports current drug use. Drug: Marijuana

## 2024-01-13 LAB
EKG ATRIAL RATE: 64 BPM
EKG P AXIS: 75 DEGREES
EKG P-R INTERVAL: 160 MS
EKG Q-T INTERVAL: 400 MS
EKG QRS DURATION: 98 MS
EKG QTC CALCULATION (BAZETT): 412 MS
EKG R AXIS: 73 DEGREES
EKG T AXIS: 68 DEGREES
EKG VENTRICULAR RATE: 64 BPM

## 2024-01-15 PROCEDURE — 93010 ELECTROCARDIOGRAM REPORT: CPT | Performed by: INTERNAL MEDICINE

## 2024-01-17 NOTE — DISCHARGE INSTRUCTIONS
-----------------------------------------------------------------------------------------------------------------                                                ENT  ~  Discharge Instructions   ----------------------------------------------------------------------------------------------------------------    You have undergone endoscopic sinus surgery  ENT Surgeon: Dr. Krish Baker MD    What to Expect During Recovery:  - You may have a low grade fever (100-101 F) for 1-3 days   - You may experience mild nausea/vomiting for 1-3 days    When to Call ENT Nurse Line:  - If you show signs of dehydration such as dark colored urine and dry lips  - If you have excessive vomiting that lasts more than 12 hours  - If you have a fever higher than 101 F   - If you have any questions about medications or your recovery    When to Come to the Emergency Room or Call 911:  - If you are bleeding from your mouth or nose that does not stop with use of Afrin  - If you are having difficulty breathing  - If you are not able to stay awake  - If you are very sick and you feel that you need immediate medical attention    Activity & Limitations:  -Do not lift anything more than 10 pounds for 1 week.   -Do not operate motor vehicles while taking Narcotic pain medication  -Sneeze with mouth open  -Do NOT blow nose    Diet:   -Normal diet    Wound care:  -A dressing can be placed over your nose to help with expected bloody secretions. It is normal to have to change this dressing ~10 times in the next 48 hours. Call the ENT clinic or go to the emergency department if you need to change the dressing more than 10 times    Medications:   -Okay to take Ibuprofen (400-800mg) every 6 hours and Tylenol (650mg) every 6 hours. It is recommended to interchange these every 3 hours to keep pain controlled around the clock. (Example: Ibuprofen at 9am, Tylenol at noon, Ibuprofen at 3pm, etc)  -You have been prescribed Norco for breakthrough pain control to

## 2024-01-23 ENCOUNTER — TELEPHONE (OUTPATIENT)
Dept: OTOLARYNGOLOGY | Age: 44
End: 2024-01-23

## 2024-01-23 ENCOUNTER — ANESTHESIA EVENT (OUTPATIENT)
Dept: OPERATING ROOM | Age: 44
End: 2024-01-23
Payer: COMMERCIAL

## 2024-01-23 DIAGNOSIS — R52 PAIN: ICD-10-CM

## 2024-01-23 DIAGNOSIS — J32.9 CHRONIC SINUSITIS, UNSPECIFIED LOCATION: Primary | ICD-10-CM

## 2024-01-23 RX ORDER — HYDROCODONE BITARTRATE AND ACETAMINOPHEN 5; 325 MG/1; MG/1
1 TABLET ORAL EVERY 6 HOURS PRN
Qty: 8 TABLET | Refills: 0 | Status: SHIPPED | OUTPATIENT
Start: 2024-01-24 | End: 2024-01-25

## 2024-01-23 RX ORDER — ACETAMINOPHEN 325 MG/1
650 TABLET ORAL EVERY 6 HOURS PRN
Qty: 120 TABLET | Refills: 3 | Status: SHIPPED | OUTPATIENT
Start: 2024-01-23

## 2024-01-23 RX ORDER — CEPHALEXIN 500 MG/1
500 CAPSULE ORAL 4 TIMES DAILY
Qty: 28 CAPSULE | Refills: 0 | Status: SHIPPED | OUTPATIENT
Start: 2024-01-24 | End: 2024-01-31

## 2024-01-23 RX ORDER — IBUPROFEN 800 MG/1
400 TABLET ORAL EVERY 6 HOURS PRN
Qty: 120 TABLET | Refills: 5 | Status: SHIPPED | OUTPATIENT
Start: 2024-01-23

## 2024-01-24 ENCOUNTER — ANESTHESIA (OUTPATIENT)
Dept: OPERATING ROOM | Age: 44
End: 2024-01-24
Payer: COMMERCIAL

## 2024-01-24 ENCOUNTER — HOSPITAL ENCOUNTER (OUTPATIENT)
Age: 44
Setting detail: OUTPATIENT SURGERY
Discharge: HOME | End: 2024-01-24
Attending: STUDENT IN AN ORGANIZED HEALTH CARE EDUCATION/TRAINING PROGRAM | Admitting: STUDENT IN AN ORGANIZED HEALTH CARE EDUCATION/TRAINING PROGRAM
Payer: COMMERCIAL

## 2024-01-24 VITALS
RESPIRATION RATE: 15 BRPM | SYSTOLIC BLOOD PRESSURE: 128 MMHG | HEART RATE: 60 BPM | OXYGEN SATURATION: 98 % | TEMPERATURE: 97.2 F | DIASTOLIC BLOOD PRESSURE: 65 MMHG

## 2024-01-24 DIAGNOSIS — J32.9 CHRONIC SINUSITIS, UNSPECIFIED LOCATION: ICD-10-CM

## 2024-01-24 LAB
MICROORGANISM/AGENT SPEC: NORMAL
MICROORGANISM/AGENT SPEC: NORMAL
SPECIMEN DESCRIPTION: NORMAL

## 2024-01-24 PROCEDURE — 2720000010 HC SURG SUPPLY STERILE: Performed by: STUDENT IN AN ORGANIZED HEALTH CARE EDUCATION/TRAINING PROGRAM

## 2024-01-24 PROCEDURE — 2580000003 HC RX 258: Performed by: STUDENT IN AN ORGANIZED HEALTH CARE EDUCATION/TRAINING PROGRAM

## 2024-01-24 PROCEDURE — 7100000010 HC PHASE II RECOVERY - FIRST 15 MIN: Performed by: STUDENT IN AN ORGANIZED HEALTH CARE EDUCATION/TRAINING PROGRAM

## 2024-01-24 PROCEDURE — 2580000003 HC RX 258

## 2024-01-24 PROCEDURE — 6370000000 HC RX 637 (ALT 250 FOR IP): Performed by: ANESTHESIOLOGY

## 2024-01-24 PROCEDURE — 6360000002 HC RX W HCPCS: Performed by: ANESTHESIOLOGY

## 2024-01-24 PROCEDURE — 87205 SMEAR GRAM STAIN: CPT

## 2024-01-24 PROCEDURE — C1894 INTRO/SHEATH, NON-LASER: HCPCS | Performed by: STUDENT IN AN ORGANIZED HEALTH CARE EDUCATION/TRAINING PROGRAM

## 2024-01-24 PROCEDURE — 7100000011 HC PHASE II RECOVERY - ADDTL 15 MIN: Performed by: STUDENT IN AN ORGANIZED HEALTH CARE EDUCATION/TRAINING PROGRAM

## 2024-01-24 PROCEDURE — 2709999900 HC NON-CHARGEABLE SUPPLY: Performed by: STUDENT IN AN ORGANIZED HEALTH CARE EDUCATION/TRAINING PROGRAM

## 2024-01-24 PROCEDURE — 87102 FUNGUS ISOLATION CULTURE: CPT

## 2024-01-24 PROCEDURE — 6370000000 HC RX 637 (ALT 250 FOR IP): Performed by: STUDENT IN AN ORGANIZED HEALTH CARE EDUCATION/TRAINING PROGRAM

## 2024-01-24 PROCEDURE — 87186 SC STD MICRODIL/AGAR DIL: CPT

## 2024-01-24 PROCEDURE — 3700000000 HC ANESTHESIA ATTENDED CARE: Performed by: STUDENT IN AN ORGANIZED HEALTH CARE EDUCATION/TRAINING PROGRAM

## 2024-01-24 PROCEDURE — 7100000001 HC PACU RECOVERY - ADDTL 15 MIN: Performed by: STUDENT IN AN ORGANIZED HEALTH CARE EDUCATION/TRAINING PROGRAM

## 2024-01-24 PROCEDURE — 7100000000 HC PACU RECOVERY - FIRST 15 MIN: Performed by: STUDENT IN AN ORGANIZED HEALTH CARE EDUCATION/TRAINING PROGRAM

## 2024-01-24 PROCEDURE — 87070 CULTURE OTHR SPECIMN AEROBIC: CPT

## 2024-01-24 PROCEDURE — 6360000002 HC RX W HCPCS

## 2024-01-24 PROCEDURE — 2500000003 HC RX 250 WO HCPCS

## 2024-01-24 PROCEDURE — 87116 MYCOBACTERIA CULTURE: CPT

## 2024-01-24 PROCEDURE — 6360000002 HC RX W HCPCS: Performed by: STUDENT IN AN ORGANIZED HEALTH CARE EDUCATION/TRAINING PROGRAM

## 2024-01-24 PROCEDURE — 3600000014 HC SURGERY LEVEL 4 ADDTL 15MIN: Performed by: STUDENT IN AN ORGANIZED HEALTH CARE EDUCATION/TRAINING PROGRAM

## 2024-01-24 PROCEDURE — 3700000001 HC ADD 15 MINUTES (ANESTHESIA): Performed by: STUDENT IN AN ORGANIZED HEALTH CARE EDUCATION/TRAINING PROGRAM

## 2024-01-24 PROCEDURE — A4217 STERILE WATER/SALINE, 500 ML: HCPCS | Performed by: STUDENT IN AN ORGANIZED HEALTH CARE EDUCATION/TRAINING PROGRAM

## 2024-01-24 PROCEDURE — 87075 CULTR BACTERIA EXCEPT BLOOD: CPT

## 2024-01-24 PROCEDURE — 87206 SMEAR FLUORESCENT/ACID STAI: CPT

## 2024-01-24 PROCEDURE — 87015 SPECIMEN INFECT AGNT CONCNTJ: CPT

## 2024-01-24 PROCEDURE — 3600000004 HC SURGERY LEVEL 4 BASE: Performed by: STUDENT IN AN ORGANIZED HEALTH CARE EDUCATION/TRAINING PROGRAM

## 2024-01-24 RX ORDER — LABETALOL HYDROCHLORIDE 5 MG/ML
10 INJECTION, SOLUTION INTRAVENOUS
Status: DISCONTINUED | OUTPATIENT
Start: 2024-01-24 | End: 2024-01-24 | Stop reason: HOSPADM

## 2024-01-24 RX ORDER — DEXAMETHASONE SODIUM PHOSPHATE 10 MG/ML
INJECTION INTRAMUSCULAR; INTRAVENOUS PRN
Status: DISCONTINUED | OUTPATIENT
Start: 2024-01-24 | End: 2024-01-24 | Stop reason: SDUPTHER

## 2024-01-24 RX ORDER — MAGNESIUM HYDROXIDE 1200 MG/15ML
LIQUID ORAL CONTINUOUS PRN
Status: DISCONTINUED | OUTPATIENT
Start: 2024-01-24 | End: 2024-01-24 | Stop reason: HOSPADM

## 2024-01-24 RX ORDER — MIDAZOLAM HYDROCHLORIDE 1 MG/ML
INJECTION INTRAMUSCULAR; INTRAVENOUS PRN
Status: DISCONTINUED | OUTPATIENT
Start: 2024-01-24 | End: 2024-01-24 | Stop reason: SDUPTHER

## 2024-01-24 RX ORDER — OXYMETAZOLINE HYDROCHLORIDE 0.05 G/100ML
SPRAY NASAL PRN
Status: DISCONTINUED | OUTPATIENT
Start: 2024-01-24 | End: 2024-01-24 | Stop reason: HOSPADM

## 2024-01-24 RX ORDER — PROPOFOL 10 MG/ML
INJECTION, EMULSION INTRAVENOUS PRN
Status: DISCONTINUED | OUTPATIENT
Start: 2024-01-24 | End: 2024-01-24 | Stop reason: SDUPTHER

## 2024-01-24 RX ORDER — SODIUM CHLORIDE, SODIUM LACTATE, POTASSIUM CHLORIDE, CALCIUM CHLORIDE 600; 310; 30; 20 MG/100ML; MG/100ML; MG/100ML; MG/100ML
INJECTION, SOLUTION INTRAVENOUS CONTINUOUS
Status: DISCONTINUED | OUTPATIENT
Start: 2024-01-24 | End: 2024-01-24 | Stop reason: HOSPADM

## 2024-01-24 RX ORDER — SODIUM CHLORIDE 0.9 % (FLUSH) 0.9 %
5-40 SYRINGE (ML) INJECTION EVERY 12 HOURS SCHEDULED
Status: CANCELLED | OUTPATIENT
Start: 2024-01-24

## 2024-01-24 RX ORDER — KETAMINE HCL IN NACL, ISO-OSM 100MG/10ML
SYRINGE (ML) INJECTION PRN
Status: DISCONTINUED | OUTPATIENT
Start: 2024-01-24 | End: 2024-01-24 | Stop reason: SDUPTHER

## 2024-01-24 RX ORDER — HYDROCODONE BITARTRATE AND ACETAMINOPHEN 5; 325 MG/1; MG/1
1 TABLET ORAL ONCE
Status: COMPLETED | OUTPATIENT
Start: 2024-01-24 | End: 2024-01-24

## 2024-01-24 RX ORDER — MAGNESIUM HYDROXIDE 1200 MG/15ML
LIQUID ORAL CONTINUOUS PRN
Status: COMPLETED | OUTPATIENT
Start: 2024-01-24 | End: 2024-01-24

## 2024-01-24 RX ORDER — SODIUM CHLORIDE 9 MG/ML
INJECTION, SOLUTION INTRAVENOUS PRN
Status: CANCELLED | OUTPATIENT
Start: 2024-01-24

## 2024-01-24 RX ORDER — PROCHLORPERAZINE EDISYLATE 5 MG/ML
5 INJECTION INTRAMUSCULAR; INTRAVENOUS
Status: DISCONTINUED | OUTPATIENT
Start: 2024-01-24 | End: 2024-01-24 | Stop reason: HOSPADM

## 2024-01-24 RX ORDER — DIMENHYDRINATE 50 MG
50 TABLET ORAL ONCE
Status: CANCELLED | OUTPATIENT
Start: 2024-01-24 | End: 2024-01-24

## 2024-01-24 RX ORDER — ACETAMINOPHEN 325 MG/1
650 TABLET ORAL ONCE
Status: CANCELLED | OUTPATIENT
Start: 2024-01-24 | End: 2024-01-24

## 2024-01-24 RX ORDER — SODIUM CHLORIDE 0.9 % (FLUSH) 0.9 %
5-40 SYRINGE (ML) INJECTION EVERY 12 HOURS SCHEDULED
Status: DISCONTINUED | OUTPATIENT
Start: 2024-01-24 | End: 2024-01-24 | Stop reason: HOSPADM

## 2024-01-24 RX ORDER — METOCLOPRAMIDE HYDROCHLORIDE 5 MG/ML
10 INJECTION INTRAMUSCULAR; INTRAVENOUS
Status: DISCONTINUED | OUTPATIENT
Start: 2024-01-24 | End: 2024-01-24 | Stop reason: HOSPADM

## 2024-01-24 RX ORDER — FENTANYL CITRATE 50 UG/ML
INJECTION, SOLUTION INTRAMUSCULAR; INTRAVENOUS PRN
Status: DISCONTINUED | OUTPATIENT
Start: 2024-01-24 | End: 2024-01-24 | Stop reason: SDUPTHER

## 2024-01-24 RX ORDER — LIDOCAINE HYDROCHLORIDE 10 MG/ML
INJECTION, SOLUTION EPIDURAL; INFILTRATION; INTRACAUDAL; PERINEURAL PRN
Status: DISCONTINUED | OUTPATIENT
Start: 2024-01-24 | End: 2024-01-24 | Stop reason: SDUPTHER

## 2024-01-24 RX ORDER — GINSENG 100 MG
CAPSULE ORAL PRN
Status: DISCONTINUED | OUTPATIENT
Start: 2024-01-24 | End: 2024-01-24 | Stop reason: HOSPADM

## 2024-01-24 RX ORDER — SODIUM CHLORIDE, SODIUM LACTATE, POTASSIUM CHLORIDE, CALCIUM CHLORIDE 600; 310; 30; 20 MG/100ML; MG/100ML; MG/100ML; MG/100ML
INJECTION, SOLUTION INTRAVENOUS CONTINUOUS PRN
Status: DISCONTINUED | OUTPATIENT
Start: 2024-01-24 | End: 2024-01-24 | Stop reason: SDUPTHER

## 2024-01-24 RX ORDER — FENTANYL CITRATE 50 UG/ML
25 INJECTION, SOLUTION INTRAMUSCULAR; INTRAVENOUS EVERY 5 MIN PRN
Status: DISCONTINUED | OUTPATIENT
Start: 2024-01-24 | End: 2024-01-24 | Stop reason: HOSPADM

## 2024-01-24 RX ORDER — SODIUM CHLORIDE 0.9 % (FLUSH) 0.9 %
5-40 SYRINGE (ML) INJECTION PRN
Status: CANCELLED | OUTPATIENT
Start: 2024-01-24

## 2024-01-24 RX ORDER — ROCURONIUM BROMIDE 10 MG/ML
INJECTION, SOLUTION INTRAVENOUS PRN
Status: DISCONTINUED | OUTPATIENT
Start: 2024-01-24 | End: 2024-01-24 | Stop reason: SDUPTHER

## 2024-01-24 RX ORDER — GLYCOPYRROLATE 0.2 MG/ML
INJECTION INTRAMUSCULAR; INTRAVENOUS PRN
Status: DISCONTINUED | OUTPATIENT
Start: 2024-01-24 | End: 2024-01-24 | Stop reason: SDUPTHER

## 2024-01-24 RX ORDER — ONDANSETRON 2 MG/ML
INJECTION INTRAMUSCULAR; INTRAVENOUS PRN
Status: DISCONTINUED | OUTPATIENT
Start: 2024-01-24 | End: 2024-01-24 | Stop reason: SDUPTHER

## 2024-01-24 RX ADMIN — FENTANYL CITRATE 50 MCG: 0.05 INJECTION, SOLUTION INTRAMUSCULAR; INTRAVENOUS at 08:23

## 2024-01-24 RX ADMIN — ROCURONIUM BROMIDE 50 MG: 10 INJECTION, SOLUTION INTRAVENOUS at 08:23

## 2024-01-24 RX ADMIN — SODIUM CHLORIDE, POTASSIUM CHLORIDE, SODIUM LACTATE AND CALCIUM CHLORIDE: 600; 310; 30; 20 INJECTION, SOLUTION INTRAVENOUS at 09:29

## 2024-01-24 RX ADMIN — LIDOCAINE HYDROCHLORIDE 50 MG: 10 INJECTION, SOLUTION EPIDURAL; INFILTRATION; INTRACAUDAL; PERINEURAL at 08:23

## 2024-01-24 RX ADMIN — Medication 50 MG: at 08:23

## 2024-01-24 RX ADMIN — SODIUM CHLORIDE, POTASSIUM CHLORIDE, SODIUM LACTATE AND CALCIUM CHLORIDE: 600; 310; 30; 20 INJECTION, SOLUTION INTRAVENOUS at 08:18

## 2024-01-24 RX ADMIN — HYDROCODONE BITARTRATE AND ACETAMINOPHEN 1 TABLET: 5; 325 TABLET ORAL at 12:46

## 2024-01-24 RX ADMIN — SUGAMMADEX 200 MG: 100 INJECTION, SOLUTION INTRAVENOUS at 10:16

## 2024-01-24 RX ADMIN — PROPOFOL 100 MG: 10 INJECTION, EMULSION INTRAVENOUS at 08:23

## 2024-01-24 RX ADMIN — ONDANSETRON 4 MG: 2 INJECTION INTRAMUSCULAR; INTRAVENOUS at 10:03

## 2024-01-24 RX ADMIN — MIDAZOLAM 2 MG: 1 INJECTION INTRAMUSCULAR; INTRAVENOUS at 08:19

## 2024-01-24 RX ADMIN — DEXAMETHASONE SODIUM PHOSPHATE 10 MG: 10 INJECTION INTRAMUSCULAR; INTRAVENOUS at 08:40

## 2024-01-24 RX ADMIN — FENTANYL CITRATE 50 MCG: 0.05 INJECTION, SOLUTION INTRAMUSCULAR; INTRAVENOUS at 09:28

## 2024-01-24 RX ADMIN — FENTANYL CITRATE 25 MCG: 50 INJECTION, SOLUTION INTRAMUSCULAR; INTRAVENOUS at 11:47

## 2024-01-24 RX ADMIN — FENTANYL CITRATE 25 MCG: 50 INJECTION, SOLUTION INTRAMUSCULAR; INTRAVENOUS at 10:40

## 2024-01-24 RX ADMIN — PROPOFOL 50 MG: 10 INJECTION, EMULSION INTRAVENOUS at 10:14

## 2024-01-24 RX ADMIN — GLYCOPYRROLATE 0.3 MG: 0.2 INJECTION INTRAMUSCULAR; INTRAVENOUS at 08:22

## 2024-01-24 RX ADMIN — Medication 2 G: at 08:48

## 2024-01-24 RX ADMIN — ROCURONIUM BROMIDE 10 MG: 10 INJECTION, SOLUTION INTRAVENOUS at 09:01

## 2024-01-24 RX ADMIN — FENTANYL CITRATE 25 MCG: 0.05 INJECTION, SOLUTION INTRAMUSCULAR; INTRAVENOUS at 09:15

## 2024-01-24 RX ADMIN — FENTANYL CITRATE 25 MCG: 0.05 INJECTION, SOLUTION INTRAMUSCULAR; INTRAVENOUS at 09:13

## 2024-01-24 ASSESSMENT — PAIN DESCRIPTION - DESCRIPTORS
DESCRIPTORS: PRESSURE

## 2024-01-24 ASSESSMENT — PAIN SCALES - GENERAL
PAINLEVEL_OUTOF10: 6
PAINLEVEL_OUTOF10: 0
PAINLEVEL_OUTOF10: 6
PAINLEVEL_OUTOF10: 0
PAINLEVEL_OUTOF10: 0
PAINLEVEL_OUTOF10: 7
PAINLEVEL_OUTOF10: 0
PAINLEVEL_OUTOF10: 3
PAINLEVEL_OUTOF10: 0
PAINLEVEL_OUTOF10: 3

## 2024-01-24 ASSESSMENT — PAIN DESCRIPTION - ORIENTATION
ORIENTATION: LEFT

## 2024-01-24 ASSESSMENT — PAIN DESCRIPTION - LOCATION
LOCATION: NOSE;FACE
LOCATION: NOSE
LOCATION: NOSE;FACE

## 2024-01-24 ASSESSMENT — PAIN - FUNCTIONAL ASSESSMENT
PAIN_FUNCTIONAL_ASSESSMENT: 0-10
PAIN_FUNCTIONAL_ASSESSMENT: FACE, LEGS, ACTIVITY, CRY, AND CONSOLABILITY (FLACC)

## 2024-01-24 ASSESSMENT — PAIN SCALES - WONG BAKER: WONGBAKER_NUMERICALRESPONSE: 0

## 2024-01-24 NOTE — PROGRESS NOTES
Toni sent to JOVON Gorman NP regarding patients c/o of pain on left side of face.  Per JOVON Gorman the patient had an infection, he will feel some pain in the next couple days.  As long as vitals stable, pain controlled and is keeping po down.  Patient may be didscharged.  Patient was also instructed to call the office if he need further clarification.  Patient agreeable to being discharged home with this information he received.

## 2024-01-24 NOTE — PROGRESS NOTES
Spoke with patient friend Rocio, notified that patient was in recovery and will be ready to be discharged home soon.

## 2024-01-24 NOTE — INTERVAL H&P NOTE
Pt Name: Meek Shi  MRN: [id number]  YOB: 1980  Date of evaluation: 1/24/2024    I have reviewed the patient's history and physical examination completed in pre-admission testing.    Changes to history or on examination, if any, are as follows:  none    ARIA DEUTSCH PA-C  1/24/24  7:38 AM

## 2024-01-24 NOTE — ANESTHESIA PRE PROCEDURE
Department of Anesthesiology  Preprocedure Note       Name:  Meek Shi   Age:  43 y.o.  :  1980                                          MRN:  [id number]         Date:  2024      Surgeon: Surgeon(s):  Krish Baker MD    Procedure: Procedure(s):  FUNCTIONAL ENDOSCOPIC SINUS SURGERY IMAGE GUIDED  (STEALTH NAVIGATION)    Medications prior to admission:   Prior to Admission medications    Medication Sig Start Date End Date Taking? Authorizing Provider   cephALEXin (KEFLEX) 500 MG capsule Take 1 capsule by mouth 4 times daily for 7 days 24  Madan Silvaila, FNP   HYDROcodone-acetaminophen (NORCO) 5-325 MG per tablet Take 1 tablet by mouth every 6 hours as needed for Pain (Take for breakthrough pain not resolved by alternating Tynenol and Motrin per discharge instructions) for up to 8 doses. Intended supply: 3 days. Take lowest dose possible to manage pain Max Daily Amount: 4 tablets 24  Igor Silva, FNP   acetaminophen (AMINOFEN) 325 MG tablet Take 2 tablets by mouth every 6 hours as needed for Pain 24   Madan Silvaila, FNP   ibuprofen (ADVIL;MOTRIN) 800 MG tablet Take 0.5 tablets by mouth every 6 hours as needed for Pain (take 1-2 tabs as needed for pain, alternating with Tylenol ( taking pain medicine every 3 hours as needed)) 24   Madan Silvaila, FNP   GNP LIDOCAINE PAIN RELIEF 4 % external patch Place 1 patch onto the skin daily  Patient not taking: Reported on 2024   Provider, MD Tyesah       Current medications:    No current facility-administered medications for this encounter.       Allergies:  No Known Allergies    Problem List:    Patient Active Problem List   Diagnosis Code   • Bradycardia R00.1   • Nicotine dependence, cigarettes, uncomplicated F17.210   • COVID U07.1   • Spontaneous pneumothorax J93.83   • History of syncope Z87.898       Past Medical History:        Diagnosis Date   • Abscess, mastoid, subperiosteal, right

## 2024-01-24 NOTE — ANESTHESIA POSTPROCEDURE EVALUATION
Department of Anesthesiology  Postprocedure Note    Patient: Meek Shi  MRN: [id number]  YOB: 1980  Date of evaluation: 1/24/2024    Procedure Summary       Date: 01/24/24 Room / Location: [address]    Anesthesia Start: 0818 Anesthesia Stop: 1030    Procedure: FUNCTIONAL ENDOSCOPIC SINUS SURGERY IMAGE GUIDED Diagnosis:       Chronic sinusitis, unspecified location      (Chronic sinusitis, unspecified location [J32.9])    Surgeons: Krish Baker MD Responsible Provider: Ja Evans MD    Anesthesia Type: general ASA Status: 3            Anesthesia Type: No value filed.    Clement Phase I: Clement Score: 10    Clement Phase II: Clement Score: 10    Anesthesia Post Evaluation    Patient location during evaluation: PACU  Patient participation: complete - patient participated  Level of consciousness: awake and alert  Airway patency: patent  Nausea & Vomiting: no nausea and no vomiting  Cardiovascular status: blood pressure returned to baseline  Respiratory status: acceptable  Hydration status: euvolemic  Comments: No known anesthesia related complication  Multimodal analgesia pain management approach  Pain management: adequate    No notable events documented.

## 2024-01-25 LAB
MICROORGANISM SPEC CULT: NORMAL
MICROORGANISM/AGENT SPEC: NORMAL
SERVICE CMNT-IMP: NORMAL
SERVICE CMNT-IMP: NORMAL
SPECIMEN DESCRIPTION: NORMAL

## 2024-01-27 DIAGNOSIS — J32.9 CHRONIC SINUSITIS, UNSPECIFIED LOCATION: Primary | ICD-10-CM

## 2024-01-27 LAB
MICROORGANISM SPEC CULT: ABNORMAL
MICROORGANISM/AGENT SPEC: ABNORMAL
SPECIMEN DESCRIPTION: ABNORMAL

## 2024-01-27 RX ORDER — SULFAMETHOXAZOLE AND TRIMETHOPRIM 800; 160 MG/1; MG/1
1 TABLET ORAL 2 TIMES DAILY
Qty: 14 TABLET | Refills: 0 | Status: SHIPPED | OUTPATIENT
Start: 2024-01-27 | End: 2024-02-03

## 2024-01-27 NOTE — PROGRESS NOTES
BRIEF ENT TELEPHONE NOTE    Called patient to discuss culture results, however, no answer x2:    Susceptibility     Methicillin-Resistant Staphylococcus aureus Citrobacter freundii     BACTERIAL SUSCEPTIBILITY PANEL ABIMAEL (Preliminary) BACTERIAL SUSCEPTIBILITY PANEL ABIMAEL (Preliminary)     ceFAZolin   >=64  Resistant     cefTRIAXone   <=0.25  Sensitive     clindamycin <=0.25  Sensitive       erythromycin <=0.25  Sensitive       gentamicin <=0.5  Sensitive 1 <=1  Sensitive     levofloxacin 0.25  Sensitive 1  Sensitive     oxacillin  Resistant       penicillin >=0.5  Resistant       piperacillin-tazobactam   <=4  Sensitive     tetracycline <=1  Sensitive       tobramycin   <=1  Sensitive     trimethoprim-sulfamethoxazole <=10  Sensitive <=20  Sensitive     vancomycin <=0.5  Sensitive        Plan:  -Voicemail left stressing the importance of picking up and starting Bactrim as soon as possible given presence of MRSA and citrobacter resistance  -Instructed patient to call infectious disease office to schedule follow up. I have contacted Dr. Arora directly regarding these results who agrees with transition to Bactrim.  -Patient will be seen in my clinic for first debridement on 1/30    Krish Baker MD  Otolaryngology - Head and Neck Surgery  [facility]

## 2024-02-06 LAB
MICROORGANISM SPEC CULT: NORMAL
MICROORGANISM SPEC CULT: NORMAL
MICROORGANISM/AGENT SPEC: NORMAL
MICROORGANISM/AGENT SPEC: NORMAL
SERVICE CMNT-IMP: NORMAL
SERVICE CMNT-IMP: NORMAL
SPECIMEN DESCRIPTION: NORMAL
SPECIMEN DESCRIPTION: NORMAL

## 2024-02-07 ENCOUNTER — OFFICE VISIT (OUTPATIENT)
Dept: INFECTIOUS DISEASES | Age: 44
End: 2024-02-07
Payer: COMMERCIAL

## 2024-02-07 VITALS
HEART RATE: 56 BPM | DIASTOLIC BLOOD PRESSURE: 62 MMHG | SYSTOLIC BLOOD PRESSURE: 114 MMHG | TEMPERATURE: 97.3 F | WEIGHT: 189 LBS | HEIGHT: 74 IN | BODY MASS INDEX: 24.26 KG/M2

## 2024-02-07 DIAGNOSIS — J32.0 CHRONIC MAXILLARY SINUSITIS: Primary | ICD-10-CM

## 2024-02-07 PROCEDURE — 99214 OFFICE O/P EST MOD 30 MIN: CPT | Performed by: INTERNAL MEDICINE

## 2024-02-07 RX ORDER — SULFAMETHOXAZOLE AND TRIMETHOPRIM 800; 160 MG/1; MG/1
1 TABLET ORAL 3 TIMES DAILY
Qty: 42 TABLET | Refills: 0 | Status: SHIPPED | OUTPATIENT
Start: 2024-02-07 | End: 2024-02-21

## 2024-02-07 ASSESSMENT — ENCOUNTER SYMPTOMS
RESPIRATORY NEGATIVE: 1
GASTROINTESTINAL NEGATIVE: 1

## 2024-02-07 NOTE — PROGRESS NOTES
[facility]  Office Progress Note  Today's Date and Time: 2/7/2024, 11:29 AM    Impression:     1. Chronic maxillary sinusitis         Recommendations   The patient did have evidence of infection and underwent surgery 1/24/2024  The patient had MRSA and Citrobacter isolated which are all susceptible to Bactrim  I will extend the Bactrim therapy to complete a 21-day course of therapy and have asked him to take 1 double strength tablet 3 times a day  The patient will follow-up with me in 4 weeks and I have instructed him to call if he has worsening sinus pain, increasing purulent drainage as he is currently reporting clear drainage, or worsening headaches.    I have ordered the following medications/ labs:  No orders of the defined types were placed in this encounter.     Orders Placed This Encounter   Medications    sulfamethoxazole-trimethoprim (BACTRIM DS;SEPTRA DS) 800-160 MG per tablet     Sig: Take 1 tablet by mouth 3 times daily for 14 days     Dispense:  42 tablet     Refill:  0       Chief complaint/reason for consultation:     Chief Complaint   Patient presents with    Cellulitis     Follow up         History of Present Illness:   Meek Shi is a 43 y.o.-year-old male who I am seeing in follow-up and he has chronic sinusitis in the maxillary, ethmoid and frontal areas and has a history of right frontal sinusitis with orbital cellulitis in the subperiosteal abscess with some bony dehiscence of the superior orbital floor as well as odontogenic component of the infection.     The patient underwent a right endoscopic maxillary antrostomy with right endoscopic total ethmoidectomy, bilateral endoscopic frontal sinusotomy with brain stent placement and stereotactic guidance for extradural image guided surgery and right superior orbitotomy with drainage of an orbital abscess on 10/3/2023 at [facility].    The patient was seen by ENT for the chronic sinus issues and was deemed a

## 2024-02-09 LAB
MICROORGANISM SPEC CULT: ABNORMAL
MICROORGANISM SPEC CULT: ABNORMAL
MICROORGANISM/AGENT SPEC: ABNORMAL
SPECIMEN DESCRIPTION: ABNORMAL

## 2024-02-12 LAB
MICROORGANISM SPEC CULT: ABNORMAL
MICROORGANISM SPEC CULT: NORMAL
MICROORGANISM/AGENT SPEC: ABNORMAL
MICROORGANISM/AGENT SPEC: NORMAL
SERVICE CMNT-IMP: ABNORMAL
SERVICE CMNT-IMP: NORMAL
SPECIMEN DESCRIPTION: ABNORMAL
SPECIMEN DESCRIPTION: NORMAL

## 2024-02-19 LAB
MICROORGANISM SPEC CULT: NORMAL
MICROORGANISM/AGENT SPEC: NORMAL
SERVICE CMNT-IMP: NORMAL
SPECIMEN DESCRIPTION: NORMAL

## 2024-03-04 ENCOUNTER — TELEPHONE (OUTPATIENT)
Dept: INFECTIOUS DISEASES | Age: 44
End: 2024-03-04

## 2024-03-04 LAB
FUNGUS IDENTIFIED: NORMAL
MICROORGANISM SPEC CULT: ABNORMAL
MICROORGANISM SPEC CULT: NORMAL
MICROORGANISM/AGENT SPEC: ABNORMAL
MICROORGANISM/AGENT SPEC: NORMAL
SERVICE CMNT-IMP: ABNORMAL
SERVICE CMNT-IMP: NORMAL
SPECIMEN DESCRIPTION: ABNORMAL
SPECIMEN DESCRIPTION: NORMAL
ZZ NTE WITH NAME CLEAN UP: SPECIMEN SOURCE: NORMAL

## 2024-03-04 NOTE — TELEPHONE ENCOUNTER
Patient called, states he has been getting worsening headaches above his left eye. Was instructed to call us if this happens. What would you like to do?

## 2024-03-11 ENCOUNTER — OFFICE VISIT (OUTPATIENT)
Dept: FAMILY MEDICINE CLINIC | Age: 44
End: 2024-03-11
Payer: COMMERCIAL

## 2024-03-11 VITALS
HEIGHT: 74 IN | SYSTOLIC BLOOD PRESSURE: 115 MMHG | OXYGEN SATURATION: 100 % | WEIGHT: 194 LBS | BODY MASS INDEX: 24.9 KG/M2 | HEART RATE: 65 BPM | DIASTOLIC BLOOD PRESSURE: 71 MMHG

## 2024-03-11 DIAGNOSIS — B07.8 OTHER VIRAL WARTS: ICD-10-CM

## 2024-03-11 DIAGNOSIS — B96.89 ACUTE BACTERIAL SINUSITIS: Primary | ICD-10-CM

## 2024-03-11 DIAGNOSIS — L98.9 SKIN ABNORMALITY: ICD-10-CM

## 2024-03-11 DIAGNOSIS — J01.90 ACUTE BACTERIAL SINUSITIS: Primary | ICD-10-CM

## 2024-03-11 PROCEDURE — 99213 OFFICE O/P EST LOW 20 MIN: CPT | Performed by: FAMILY MEDICINE

## 2024-03-11 PROCEDURE — 17110 DESTRUCTION B9 LES UP TO 14: CPT | Performed by: FAMILY MEDICINE

## 2024-03-11 RX ORDER — AMOXICILLIN AND CLAVULANATE POTASSIUM 875; 125 MG/1; MG/1
1 TABLET, FILM COATED ORAL 2 TIMES DAILY
Qty: 20 TABLET | Refills: 0 | Status: SHIPPED | OUTPATIENT
Start: 2024-03-11 | End: 2024-03-21

## 2024-03-11 RX ORDER — GUAIFENESIN 600 MG/1
1200 TABLET, EXTENDED RELEASE ORAL 2 TIMES DAILY
Qty: 40 TABLET | Refills: 0 | Status: SHIPPED | OUTPATIENT
Start: 2024-03-11 | End: 2024-03-21

## 2024-03-11 RX ORDER — ECHINACEA PURPUREA EXTRACT 125 MG
2 TABLET ORAL 4 TIMES DAILY
Qty: 1 EACH | Refills: 3 | Status: SHIPPED | OUTPATIENT
Start: 2024-03-11

## 2024-03-11 ASSESSMENT — PATIENT HEALTH QUESTIONNAIRE - PHQ9
2. FEELING DOWN, DEPRESSED OR HOPELESS: 0
SUM OF ALL RESPONSES TO PHQ QUESTIONS 1-9: 0
SUM OF ALL RESPONSES TO PHQ QUESTIONS 1-9: 0
1. LITTLE INTEREST OR PLEASURE IN DOING THINGS: 0
SUM OF ALL RESPONSES TO PHQ QUESTIONS 1-9: 0
SUM OF ALL RESPONSES TO PHQ QUESTIONS 1-9: 0
SUM OF ALL RESPONSES TO PHQ9 QUESTIONS 1 & 2: 0

## 2024-03-11 NOTE — PROGRESS NOTES
[facility]  [facility] MEDICINE  [address]  [address]  [facility] [address]  Dept: [phone]      Meek Shi is a 44 y.o. male who presents today for follow up on his  medical conditions as noted below.      Chief Complaint   Patient presents with    Finger Pain     Left     Headache       Patient Active Problem List:     Bradycardia     Nicotine dependence, cigarettes, uncomplicated     COVID     Spontaneous pneumothorax     History of syncope     Past Medical History:   Diagnosis Date    Abscess, mastoid, subperiosteal, right 10/2023    ADHD     As a kid    Asthma     Childhood.  Denies difficulty since then.  No current treatment.    Bipolar disorder (HCC)     \"Diagnosed when I was younger but I worked through it.\"    Bradycardia 10/2022    ER visit . Asymptomatic.    COVID-19 10/01/2022    Lumbar sprain 12/07/2023    ER visit    Major depression     Orbital cellulitis on right 10/2023    Poor dentition 01/12/2024    States that dentist said that he needs all teeth removed.    Spontaneous pneumothorax     Left    Under care of team     PCP - Dr. Mcpherson - last visit 12/15/2023    Under care of team     Infectious Disease - Dr. Arora - last visit 11/8/2023    Under care of team     Otolaryngology - Dr. Baker - last visit 12/19/2023    Under care of team     Cardiology - New pt appt 1/24/2024 - same day as surgery.      Past Surgical History:   Procedure Laterality Date    HAND SURGERY Right     Boxer's fracture    MAXILLARY ANTROSTOMY Right 10/03/2023    Ethmoidectomy - [facility]    SINUS ENDOSCOPY N/A 1/24/2024    FUNCTIONAL ENDOSCOPIC SINUS SURGERY IMAGE GUIDED performed by Krish [facility]Bker [facility]    SINUS SURGERY  01/24/2024    SINUSOTOMY  10/03/2023    With brain stent placement, rt superior orbitotomy with drainage of orbital abscess - [facility]     Family History   Adopted: Yes   Problem Relation Age of Onset    Heart Disease Mother     Kidney stones

## 2024-03-12 DIAGNOSIS — J32.0 CHRONIC MAXILLARY SINUSITIS: Primary | ICD-10-CM

## 2024-03-13 ENCOUNTER — OFFICE VISIT (OUTPATIENT)
Dept: INFECTIOUS DISEASES | Age: 44
End: 2024-03-13
Payer: COMMERCIAL

## 2024-03-13 VITALS
WEIGHT: 189 LBS | TEMPERATURE: 97.1 F | HEIGHT: 74 IN | SYSTOLIC BLOOD PRESSURE: 109 MMHG | DIASTOLIC BLOOD PRESSURE: 68 MMHG | HEART RATE: 73 BPM | BODY MASS INDEX: 24.26 KG/M2

## 2024-03-13 DIAGNOSIS — J32.1 CHRONIC FRONTAL SINUSITIS: ICD-10-CM

## 2024-03-13 DIAGNOSIS — J32.2 CHRONIC ETHMOIDAL SINUSITIS: ICD-10-CM

## 2024-03-13 DIAGNOSIS — J32.0 CHRONIC MAXILLARY SINUSITIS: Primary | ICD-10-CM

## 2024-03-13 PROCEDURE — 99214 OFFICE O/P EST MOD 30 MIN: CPT | Performed by: INTERNAL MEDICINE

## 2024-03-13 ASSESSMENT — ENCOUNTER SYMPTOMS
SINUS PAIN: 1
GASTROINTESTINAL NEGATIVE: 1
RESPIRATORY NEGATIVE: 1

## 2024-03-13 NOTE — PROGRESS NOTES
cefTRIAXone Sensitive <=0.25 BACTERIAL SUSCEPTIBILITY PANEL ABIMAEL Final    gentamicin Sensitive <=1 BACTERIAL SUSCEPTIBILITY PANEL ABIMAEL Final    levofloxacin Sensitive 1 BACTERIAL SUSCEPTIBILITY PANEL ABIMAEL Final    piperacillin-tazobactam Sensitive <=4 BACTERIAL SUSCEPTIBILITY PANEL ABIMAEL Final    tobramycin Sensitive <=1 BACTERIAL SUSCEPTIBILITY PANEL ABIMAEL Final    trimethoprim-sulfamethoxazole Sensitive <=20 BACTERIAL SUSCEPTIBILITY PANEL ABIMAEL Final     Condensed View         Specimen Collected: 01/24/24 09:04 EST Last Resulted: 01/27/24 09:15 EST             Thank you for allowing us to participate in the care of this patient. Pleasecall with questions.    Kwesi Arora MD  Perfect Serve messaging: [phone]    This note is created with the assistance of a speech recognition program.  While intending to generate a document that actually reflects the content ofthe visit, the document can still have some errors including those of syntax and sound a like substitutions which may escape proof reading.  It such instances, actual meaning can be extrapolated by contextual diversion.

## 2024-03-19 ENCOUNTER — HOSPITAL ENCOUNTER (OUTPATIENT)
Dept: CT IMAGING | Age: 44
Discharge: HOME | End: 2024-03-21
Attending: INTERNAL MEDICINE
Payer: COMMERCIAL

## 2024-03-19 DIAGNOSIS — J32.0 CHRONIC MAXILLARY SINUSITIS: ICD-10-CM

## 2024-03-19 PROCEDURE — 70486 CT MAXILLOFACIAL W/O DYE: CPT

## 2024-03-28 ENCOUNTER — OFFICE VISIT (OUTPATIENT)
Dept: INFECTIOUS DISEASES | Age: 44
End: 2024-03-28
Payer: COMMERCIAL

## 2024-03-28 VITALS
RESPIRATION RATE: 16 BRPM | HEART RATE: 88 BPM | HEIGHT: 74 IN | BODY MASS INDEX: 23.61 KG/M2 | DIASTOLIC BLOOD PRESSURE: 75 MMHG | SYSTOLIC BLOOD PRESSURE: 111 MMHG | TEMPERATURE: 98.4 F | WEIGHT: 184 LBS

## 2024-03-28 DIAGNOSIS — J32.0 CHRONIC MAXILLARY SINUSITIS: ICD-10-CM

## 2024-03-28 DIAGNOSIS — J32.1 CHRONIC FRONTAL SINUSITIS: Primary | ICD-10-CM

## 2024-03-28 PROCEDURE — 99214 OFFICE O/P EST MOD 30 MIN: CPT | Performed by: INTERNAL MEDICINE

## 2024-03-28 ASSESSMENT — ENCOUNTER SYMPTOMS
SINUS PAIN: 1
SINUS PRESSURE: 1

## 2024-03-28 NOTE — PROGRESS NOTES
[facility]  Office Progress Note  Today's Date and Time: 3/28/2024, 11:16 AM    Impression:     1. Chronic frontal sinusitis    2. Chronic maxillary sinusitis         Recommendations   At this point in time I do feel that the imaging suggest chronic changes and I suspect that the headaches as well as the sinus drainage would be expected  The patient did complete a course of antibiotics with Augmentin with some improvement in his symptoms.  He is following up with ENT tomorrow and at this point in time I do not have any plan to give him any additional antibiotic therapy.  I do feel that he has chronic sinusitis and chronic sinus drainage and I am not sure that further antibiotics at this time would be helpful.    I have ordered the following medications/ labs:  No orders of the defined types were placed in this encounter.     No orders of the defined types were placed in this encounter.      Chief complaint/reason for consultation:     Chief Complaint   Patient presents with    Frequent Infections     Chronic maxillary sinusitis        History of Present Illness:   Meek Shi is a 44 y.o.-year-old male who I am seeing in follow-up after recent visit 3/13/2024.    Meek has a complicated history of right frontal sinusitis, orbital cellulitis with subperiosteal abscess for which the patient required surgery.    The patient has had chronic sinus issues and was deemed a candidate for revision endoscopic sinus surgery given recurrent versus persistent infection in the right maxillary and frontal sinus.  The patient was taken on 1/24/2024 and had right maxillary antrostomy, right revision anterior and posterior ethmoidectomy, right frontal sinus exploration with removal of tissue outfracture of bilateral inferior turbinates and the patient did have the rain stent with a significant amount of crusting and purulence surrounding it and after cultures were done the stent was removed.  The right

## 2024-04-18 ENCOUNTER — TELEPHONE (OUTPATIENT)
Dept: INFECTIOUS DISEASES | Age: 44
End: 2024-04-18

## 2024-04-18 NOTE — TELEPHONE ENCOUNTER
Dr Vences from ENT at [facility] called with a plan for surgery. She would like to talk to you about pre op medication. Please call her on her cell #[phone]

## 2024-05-28 ENCOUNTER — APPOINTMENT (OUTPATIENT)
Age: 44
End: 2024-05-28
Payer: COMMERCIAL

## 2024-05-28 ENCOUNTER — APPOINTMENT (OUTPATIENT)
Dept: GENERAL RADIOLOGY | Age: 44
End: 2024-05-28
Payer: COMMERCIAL

## 2024-05-28 ENCOUNTER — HOSPITAL ENCOUNTER (INPATIENT)
Age: 44
LOS: 3 days | Discharge: HOME | End: 2024-05-31
Attending: EMERGENCY MEDICINE | Admitting: INTERNAL MEDICINE
Payer: COMMERCIAL

## 2024-05-28 DIAGNOSIS — R55 PRE-SYNCOPE: ICD-10-CM

## 2024-05-28 DIAGNOSIS — R00.1 BRADYCARDIA: ICD-10-CM

## 2024-05-28 DIAGNOSIS — R07.9 CHEST PAIN, UNSPECIFIED TYPE: Primary | ICD-10-CM

## 2024-05-28 DIAGNOSIS — I20.9 ANGINA PECTORIS (HCC): ICD-10-CM

## 2024-05-28 LAB
ALBUMIN SERPL-MCNC: 4.3 G/DL (ref 3.5–5.2)
ALBUMIN/GLOB SERPL: 2 {RATIO} (ref 1–2.5)
ALP SERPL-CCNC: 66 U/L (ref 40–129)
ALT SERPL-CCNC: 9 U/L (ref 10–50)
ANION GAP SERPL CALCULATED.3IONS-SCNC: 9 MMOL/L (ref 9–16)
AST SERPL-CCNC: 19 U/L (ref 10–50)
BACTERIA URNS QL MICRO: NORMAL
BASOPHILS # BLD: 0.04 K/UL (ref 0–0.2)
BASOPHILS NFR BLD: 1 % (ref 0–2)
BILIRUB SERPL-MCNC: 0.4 MG/DL (ref 0–1.2)
BILIRUB UR QL STRIP: NEGATIVE
BNP SERPL-MCNC: 113 PG/ML (ref 0–300)
BUN SERPL-MCNC: 13 MG/DL (ref 6–20)
CA-I BLD-SCNC: 1.16 MMOL/L (ref 1.13–1.33)
CALCIUM SERPL-MCNC: 8.8 MG/DL (ref 8.6–10.4)
CASTS #/AREA URNS LPF: NORMAL /LPF (ref 0–8)
CHLORIDE SERPL-SCNC: 107 MMOL/L (ref 98–107)
CK SERPL-CCNC: 86 U/L (ref 39–308)
CLARITY UR: CLEAR
CO2 SERPL-SCNC: 25 MMOL/L (ref 20–31)
COLOR UR: YELLOW
CREAT SERPL-MCNC: 0.9 MG/DL (ref 0.7–1.2)
ECHO AO ROOT DIAM: 3.5 CM
ECHO AO ROOT INDEX: 1.67 CM/M2
ECHO AV AREA PEAK VELOCITY: 2.2 CM2
ECHO AV AREA VTI: 2.4 CM2
ECHO AV AREA/BSA PEAK VELOCITY: 1 CM2/M2
ECHO AV AREA/BSA VTI: 1.1 CM2/M2
ECHO AV MEAN GRADIENT: 2 MMHG
ECHO AV MEAN VELOCITY: 0.7 M/S
ECHO AV PEAK GRADIENT: 4 MMHG
ECHO AV PEAK VELOCITY: 1.1 M/S
ECHO AV VELOCITY RATIO: 0.73
ECHO AV VTI: 25.1 CM
ECHO BSA: 2.09 M2
ECHO EST RA PRESSURE: 8 MMHG
ECHO LA AREA 2C: 14.5 CM2
ECHO LA AREA 4C: 13.4 CM2
ECHO LA DIAMETER INDEX: 1.76 CM/M2
ECHO LA DIAMETER: 3.7 CM
ECHO LA MAJOR AXIS: 4.6 CM
ECHO LA MINOR AXIS: 3.8 CM
ECHO LA TO AORTIC ROOT RATIO: 1.06
ECHO LA VOL BP: 33 ML (ref 18–58)
ECHO LA VOL MOD A2C: 34 ML (ref 18–58)
ECHO LA VOL MOD A4C: 27 ML (ref 18–58)
ECHO LA VOL/BSA BIPLANE: 16 ML/M2 (ref 16–34)
ECHO LA VOLUME INDEX MOD A2C: 16 ML/M2 (ref 16–34)
ECHO LA VOLUME INDEX MOD A4C: 13 ML/M2 (ref 16–34)
ECHO LV E' LATERAL VELOCITY: 18 CM/S
ECHO LV E' SEPTAL VELOCITY: 12 CM/S
ECHO LV EDV A2C: 138 ML
ECHO LV EDV A4C: 87 ML
ECHO LV EDV INDEX A4C: 41 ML/M2
ECHO LV EDV NDEX A2C: 66 ML/M2
ECHO LV EJECTION FRACTION A2C: 60 %
ECHO LV EJECTION FRACTION A4C: 55 %
ECHO LV EJECTION FRACTION BIPLANE: 57 % (ref 55–100)
ECHO LV ESV A2C: 55 ML
ECHO LV ESV A4C: 39 ML
ECHO LV ESV INDEX A2C: 26 ML/M2
ECHO LV ESV INDEX A4C: 19 ML/M2
ECHO LV FRACTIONAL SHORTENING: 29 % (ref 28–44)
ECHO LV INTERNAL DIMENSION DIASTOLE INDEX: 2.62 CM/M2
ECHO LV INTERNAL DIMENSION DIASTOLIC: 5.5 CM (ref 4.2–5.9)
ECHO LV INTERNAL DIMENSION SYSTOLIC INDEX: 1.86 CM/M2
ECHO LV INTERNAL DIMENSION SYSTOLIC: 3.9 CM
ECHO LV IVSD: 0.9 CM (ref 0.6–1)
ECHO LV MASS 2D: 185.8 G (ref 88–224)
ECHO LV MASS INDEX 2D: 88.5 G/M2 (ref 49–115)
ECHO LV POSTERIOR WALL DIASTOLIC: 0.9 CM (ref 0.6–1)
ECHO LV RELATIVE WALL THICKNESS RATIO: 0.33
ECHO LVOT AREA: 3.1 CM2
ECHO LVOT AV VTI INDEX: 0.76
ECHO LVOT DIAM: 2 CM
ECHO LVOT MEAN GRADIENT: 1 MMHG
ECHO LVOT PEAK GRADIENT: 2 MMHG
ECHO LVOT PEAK VELOCITY: 0.8 M/S
ECHO LVOT STROKE VOLUME INDEX: 28.6 ML/M2
ECHO LVOT SV: 60 ML
ECHO LVOT VTI: 19.1 CM
ECHO MV A VELOCITY: 0.3 M/S
ECHO MV AREA VTI: 1.6 CM2
ECHO MV E DECELERATION TIME (DT): 241 MS
ECHO MV E VELOCITY: 0.75 M/S
ECHO MV E/A RATIO: 2.5
ECHO MV E/E' LATERAL: 4.17
ECHO MV E/E' RATIO (AVERAGED): 5.21
ECHO MV E/E' SEPTAL: 6.25
ECHO MV LVOT VTI INDEX: 1.92
ECHO MV MAX VELOCITY: 0.8 M/S
ECHO MV MEAN GRADIENT: 1 MMHG
ECHO MV MEAN VELOCITY: 0.4 M/S
ECHO MV PEAK GRADIENT: 2 MMHG
ECHO MV VTI: 36.6 CM
ECHO PV MAX VELOCITY: 0.8 M/S
ECHO PV PEAK GRADIENT: 3 MMHG
ECHO RIGHT VENTRICULAR SYSTOLIC PRESSURE (RVSP): 34 MMHG
ECHO RV BASAL DIMENSION: 4.5 CM
ECHO RV FREE WALL PEAK S': 13 CM/S
ECHO RV TAPSE: 2.3 CM (ref 1.7–?)
ECHO TV REGURGITANT MAX VELOCITY: 2.55 M/S
ECHO TV REGURGITANT PEAK GRADIENT: 26 MMHG
EKG ATRIAL RATE: 54 BPM
EKG P AXIS: 71 DEGREES
EKG P-R INTERVAL: 168 MS
EKG Q-T INTERVAL: 458 MS
EKG QRS DURATION: 96 MS
EKG QTC CALCULATION (BAZETT): 434 MS
EKG R AXIS: 63 DEGREES
EKG T AXIS: 62 DEGREES
EKG VENTRICULAR RATE: 54 BPM
EOSINOPHIL # BLD: 0.08 K/UL (ref 0–0.44)
EOSINOPHILS RELATIVE PERCENT: 1 % (ref 1–4)
EPI CELLS #/AREA URNS HPF: NORMAL /HPF (ref 0–5)
ERYTHROCYTE [DISTWIDTH] IN BLOOD BY AUTOMATED COUNT: 12.3 % (ref 11.8–14.4)
GFR, ESTIMATED: >90 ML/MIN/1.73M2
GLUCOSE SERPL-MCNC: 104 MG/DL (ref 74–99)
GLUCOSE UR STRIP-MCNC: NEGATIVE MG/DL
HCT VFR BLD AUTO: 38.2 % (ref 40.7–50.3)
HGB BLD-MCNC: 12.3 G/DL (ref 13–17)
HGB UR QL STRIP.AUTO: ABNORMAL
IMM GRANULOCYTES # BLD AUTO: <0.03 K/UL (ref 0–0.3)
IMM GRANULOCYTES NFR BLD: 0 %
INR PPP: 1
KETONES UR STRIP-MCNC: NEGATIVE MG/DL
LACTIC ACID, WHOLE BLOOD: 1 MMOL/L (ref 0.7–2.1)
LEUKOCYTE ESTERASE UR QL STRIP: NEGATIVE
LYMPHOCYTES NFR BLD: 1.82 K/UL (ref 1.1–3.7)
LYMPHOCYTES RELATIVE PERCENT: 24 % (ref 24–43)
MAGNESIUM SERPL-MCNC: 2 MG/DL (ref 1.6–2.6)
MCH RBC QN AUTO: 30.2 PG (ref 25.2–33.5)
MCHC RBC AUTO-ENTMCNC: 32.2 G/DL (ref 28.4–34.8)
MCV RBC AUTO: 93.9 FL (ref 82.6–102.9)
MONOCYTES NFR BLD: 0.36 K/UL (ref 0.1–1.2)
MONOCYTES NFR BLD: 5 % (ref 3–12)
NEUTROPHILS NFR BLD: 69 % (ref 36–65)
NEUTS SEG NFR BLD: 5.21 K/UL (ref 1.5–8.1)
NITRITE UR QL STRIP: NEGATIVE
NRBC BLD-RTO: 0 PER 100 WBC
PH UR STRIP: 6.5 [PH] (ref 5–8)
PHOSPHATE SERPL-MCNC: 3.7 MG/DL (ref 2.5–4.5)
PLATELET # BLD AUTO: 174 K/UL (ref 138–453)
PMV BLD AUTO: 10.9 FL (ref 8.1–13.5)
POTASSIUM SERPL-SCNC: 4.2 MMOL/L (ref 3.7–5.3)
PROT SERPL-MCNC: 6.6 G/DL (ref 6.6–8.7)
PROT UR STRIP-MCNC: NEGATIVE MG/DL
PROTHROMBIN TIME: 13.5 SEC (ref 11.7–14.9)
RBC # BLD AUTO: 4.07 M/UL (ref 4.21–5.77)
RBC #/AREA URNS HPF: NORMAL /HPF (ref 0–4)
SODIUM SERPL-SCNC: 141 MMOL/L (ref 136–145)
SP GR UR STRIP: 1.03 (ref 1–1.03)
T4 FREE SERPL-MCNC: 0.9 NG/DL (ref 0.92–1.68)
TROPONIN I SERPL HS-MCNC: <6 NG/L (ref 0–22)
TSH SERPL DL<=0.05 MIU/L-ACNC: 0.26 UIU/ML (ref 0.27–4.2)
UROBILINOGEN UR STRIP-ACNC: NORMAL EU/DL (ref 0–1)
WBC #/AREA URNS HPF: NORMAL /HPF (ref 0–5)
WBC OTHER # BLD: 7.5 K/UL (ref 3.5–11.3)

## 2024-05-28 PROCEDURE — 85025 COMPLETE CBC W/AUTO DIFF WBC: CPT

## 2024-05-28 PROCEDURE — 93306 TTE W/DOPPLER COMPLETE: CPT

## 2024-05-28 PROCEDURE — 36415 COLL VENOUS BLD VENIPUNCTURE: CPT

## 2024-05-28 PROCEDURE — 81001 URINALYSIS AUTO W/SCOPE: CPT

## 2024-05-28 PROCEDURE — 82330 ASSAY OF CALCIUM: CPT

## 2024-05-28 PROCEDURE — 83735 ASSAY OF MAGNESIUM: CPT

## 2024-05-28 PROCEDURE — 83880 ASSAY OF NATRIURETIC PEPTIDE: CPT

## 2024-05-28 PROCEDURE — 80053 COMPREHEN METABOLIC PANEL: CPT

## 2024-05-28 PROCEDURE — 84100 ASSAY OF PHOSPHORUS: CPT

## 2024-05-28 PROCEDURE — 93306 TTE W/DOPPLER COMPLETE: CPT | Performed by: INTERNAL MEDICINE

## 2024-05-28 PROCEDURE — 85610 PROTHROMBIN TIME: CPT

## 2024-05-28 PROCEDURE — 99285 EMERGENCY DEPT VISIT HI MDM: CPT

## 2024-05-28 PROCEDURE — 93010 ELECTROCARDIOGRAM REPORT: CPT | Performed by: INTERNAL MEDICINE

## 2024-05-28 PROCEDURE — 84484 ASSAY OF TROPONIN QUANT: CPT

## 2024-05-28 PROCEDURE — 84443 ASSAY THYROID STIM HORMONE: CPT

## 2024-05-28 PROCEDURE — 2580000003 HC RX 258

## 2024-05-28 PROCEDURE — 83605 ASSAY OF LACTIC ACID: CPT

## 2024-05-28 PROCEDURE — 84439 ASSAY OF FREE THYROXINE: CPT

## 2024-05-28 PROCEDURE — 82550 ASSAY OF CK (CPK): CPT

## 2024-05-28 PROCEDURE — 93005 ELECTROCARDIOGRAM TRACING: CPT

## 2024-05-28 PROCEDURE — 71045 X-RAY EXAM CHEST 1 VIEW: CPT

## 2024-05-28 PROCEDURE — 2060000000 HC ICU INTERMEDIATE R&B

## 2024-05-28 PROCEDURE — 6370000000 HC RX 637 (ALT 250 FOR IP)

## 2024-05-28 RX ORDER — ONDANSETRON 2 MG/ML
4 INJECTION INTRAMUSCULAR; INTRAVENOUS EVERY 6 HOURS PRN
Status: DISCONTINUED | OUTPATIENT
Start: 2024-05-28 | End: 2024-05-28 | Stop reason: SDUPTHER

## 2024-05-28 RX ORDER — POLYETHYLENE GLYCOL 3350 17 G/17G
17 POWDER, FOR SOLUTION ORAL DAILY PRN
Status: DISCONTINUED | OUTPATIENT
Start: 2024-05-28 | End: 2024-05-31 | Stop reason: HOSPADM

## 2024-05-28 RX ORDER — POTASSIUM CHLORIDE 7.45 MG/ML
10 INJECTION INTRAVENOUS PRN
Status: DISCONTINUED | OUTPATIENT
Start: 2024-05-28 | End: 2024-05-31 | Stop reason: HOSPADM

## 2024-05-28 RX ORDER — ACETAMINOPHEN 650 MG/1
650 SUPPOSITORY RECTAL EVERY 6 HOURS PRN
Status: DISCONTINUED | OUTPATIENT
Start: 2024-05-28 | End: 2024-05-31 | Stop reason: HOSPADM

## 2024-05-28 RX ORDER — SODIUM CHLORIDE 0.9 % (FLUSH) 0.9 %
5-40 SYRINGE (ML) INJECTION PRN
Status: DISCONTINUED | OUTPATIENT
Start: 2024-05-28 | End: 2024-05-31 | Stop reason: HOSPADM

## 2024-05-28 RX ORDER — ENOXAPARIN SODIUM 100 MG/ML
40 INJECTION SUBCUTANEOUS DAILY
Status: DISCONTINUED | OUTPATIENT
Start: 2024-05-28 | End: 2024-05-28

## 2024-05-28 RX ORDER — ENOXAPARIN SODIUM 100 MG/ML
40 INJECTION SUBCUTANEOUS DAILY
Status: DISCONTINUED | OUTPATIENT
Start: 2024-05-28 | End: 2024-05-31 | Stop reason: HOSPADM

## 2024-05-28 RX ORDER — SODIUM CHLORIDE 9 MG/ML
INJECTION, SOLUTION INTRAVENOUS PRN
Status: DISCONTINUED | OUTPATIENT
Start: 2024-05-28 | End: 2024-05-31 | Stop reason: HOSPADM

## 2024-05-28 RX ORDER — 0.9 % SODIUM CHLORIDE 0.9 %
1000 INTRAVENOUS SOLUTION INTRAVENOUS ONCE
Status: COMPLETED | OUTPATIENT
Start: 2024-05-28 | End: 2024-05-28

## 2024-05-28 RX ORDER — ASPIRIN 81 MG/1
324 TABLET, CHEWABLE ORAL ONCE
Status: COMPLETED | OUTPATIENT
Start: 2024-05-28 | End: 2024-05-28

## 2024-05-28 RX ORDER — ACETAMINOPHEN 325 MG/1
650 TABLET ORAL EVERY 4 HOURS PRN
Status: DISCONTINUED | OUTPATIENT
Start: 2024-05-28 | End: 2024-05-28 | Stop reason: SDUPTHER

## 2024-05-28 RX ORDER — ONDANSETRON 4 MG/1
4 TABLET, ORALLY DISINTEGRATING ORAL EVERY 8 HOURS PRN
Status: DISCONTINUED | OUTPATIENT
Start: 2024-05-28 | End: 2024-05-31 | Stop reason: HOSPADM

## 2024-05-28 RX ORDER — ONDANSETRON 4 MG/1
4 TABLET, ORALLY DISINTEGRATING ORAL EVERY 8 HOURS PRN
Status: DISCONTINUED | OUTPATIENT
Start: 2024-05-28 | End: 2024-05-28 | Stop reason: SDUPTHER

## 2024-05-28 RX ORDER — SODIUM CHLORIDE 0.9 % (FLUSH) 0.9 %
5-40 SYRINGE (ML) INJECTION EVERY 12 HOURS SCHEDULED
Status: DISCONTINUED | OUTPATIENT
Start: 2024-05-28 | End: 2024-05-31 | Stop reason: HOSPADM

## 2024-05-28 RX ORDER — POTASSIUM CHLORIDE 20 MEQ/1
40 TABLET, EXTENDED RELEASE ORAL PRN
Status: DISCONTINUED | OUTPATIENT
Start: 2024-05-28 | End: 2024-05-31 | Stop reason: HOSPADM

## 2024-05-28 RX ORDER — ONDANSETRON 2 MG/ML
4 INJECTION INTRAMUSCULAR; INTRAVENOUS EVERY 6 HOURS PRN
Status: DISCONTINUED | OUTPATIENT
Start: 2024-05-28 | End: 2024-05-31 | Stop reason: HOSPADM

## 2024-05-28 RX ORDER — ACETAMINOPHEN 325 MG/1
650 TABLET ORAL EVERY 6 HOURS PRN
Status: DISCONTINUED | OUTPATIENT
Start: 2024-05-28 | End: 2024-05-31 | Stop reason: HOSPADM

## 2024-05-28 RX ORDER — MAGNESIUM SULFATE IN WATER 40 MG/ML
2000 INJECTION, SOLUTION INTRAVENOUS PRN
Status: DISCONTINUED | OUTPATIENT
Start: 2024-05-28 | End: 2024-05-31 | Stop reason: HOSPADM

## 2024-05-28 RX ORDER — FAMOTIDINE 20 MG/1
20 TABLET, FILM COATED ORAL 2 TIMES DAILY
Status: DISCONTINUED | OUTPATIENT
Start: 2024-05-28 | End: 2024-05-31 | Stop reason: HOSPADM

## 2024-05-28 RX ORDER — SODIUM CHLORIDE 9 MG/ML
INJECTION, SOLUTION INTRAVENOUS PRN
Status: DISCONTINUED | OUTPATIENT
Start: 2024-05-28 | End: 2024-05-28 | Stop reason: SDUPTHER

## 2024-05-28 RX ADMIN — SODIUM CHLORIDE, PRESERVATIVE FREE 10 ML: 5 INJECTION INTRAVENOUS at 20:22

## 2024-05-28 RX ADMIN — SODIUM CHLORIDE 1000 ML: 9 INJECTION, SOLUTION INTRAVENOUS at 14:38

## 2024-05-28 RX ADMIN — FAMOTIDINE 20 MG: 20 TABLET, FILM COATED ORAL at 12:33

## 2024-05-28 RX ADMIN — FAMOTIDINE 20 MG: 20 TABLET, FILM COATED ORAL at 20:22

## 2024-05-28 RX ADMIN — ASPIRIN 81 MG 324 MG: 81 TABLET ORAL at 12:33

## 2024-05-28 ASSESSMENT — PAIN DESCRIPTION - LOCATION: LOCATION: CHEST

## 2024-05-28 ASSESSMENT — ENCOUNTER SYMPTOMS
ABDOMINAL PAIN: 0
BACK PAIN: 0
NAUSEA: 0
RHINORRHEA: 0
VOMITING: 0
COUGH: 0
DIARRHEA: 0
SHORTNESS OF BREATH: 0

## 2024-05-28 ASSESSMENT — PAIN SCALES - GENERAL: PAINLEVEL_OUTOF10: 4

## 2024-05-28 ASSESSMENT — PAIN - FUNCTIONAL ASSESSMENT: PAIN_FUNCTIONAL_ASSESSMENT: 0-10

## 2024-05-28 NOTE — ED NOTES
ED to inpatient nurses report      Chief Complaint:  Chief Complaint   Patient presents with    Chest Pain     Present to ED from: Home    MOA:     LOC: alert and orientated to name, place, date  Mobility: Independent  Oxygen Baseline: room air    Current needs required: none   Pending ED orders: no  Present condition: stable    Why did the patient come to the ED? Chest pain  What is the plan? Cardiology consult due to low heart rate   Any procedures or intervention occur? No  Any safety concerns??    Mental Status:       Psych Assessment:   Psychosocial  Psychosocial (WDL): Within Defined Limits  Vital signs   Vitals:    05/28/24 1143 05/28/24 1151   BP: 103/61    Pulse: 54    Resp: 19    Temp: 97 °F (36.1 °C)    SpO2: 98%    Weight:  83.9 kg (185 lb)   Height:  1.88 m (6' 2\")        Vitals:  Patient Vitals for the past 24 hrs:   BP Temp Pulse Resp SpO2 Height Weight   05/28/24 1151 -- -- -- -- -- 1.88 m (6' 2\") 83.9 kg (185 lb)   05/28/24 1143 103/61 97 °F (36.1 °C) 54 19 98 % -- --      Visit Vitals  /61   Pulse 54   Temp 97 °F (36.1 °C)   Resp 19   Ht 1.88 m (6' 2\")   Wt 83.9 kg (185 lb)   SpO2 98%   BMI 23.75 kg/m²        LDAs:   Peripheral IV 05/28/24 Right Antecubital (Active)   Site Assessment Clean, dry & intact 05/28/24 1217   Line Status Blood return noted;Flushed;Specimen collected 05/28/24 1217   Phlebitis Assessment No symptoms 05/28/24 1217   Infiltration Assessment 0 05/28/24 1217   Dressing Status New dressing applied 05/28/24 1217   Dressing Type Transparent 05/28/24 1217   Dressing Intervention New 05/28/24 1217       Ambulatory Status:  Presents to emergency department  because of falls (Syncope, seizure, or loss of consciousness): No, Age > 70: No, Altered Mental Status, Intoxication with alcohol or substance confusion (Disorientation, impaired judgment, poor safety awaremess, or inability to follow instructions): No, Impaired Mobility: Ambulates or transfers with assistive devices or

## 2024-05-28 NOTE — ED PROVIDER NOTES
[facility]     Emergency Department     Faculty Note/ Attestation      Pt Name: Meek Shi                                       MRN: [id number]  Birthdate 1980  Date of evaluation: 5/28/2024    Patients PCP:    Macey Mcpherson DO    Note Started: 12:03 PM EDT      Attestation  I performed a history and physical examination of the patient and discussed management with the resident. I reviewed the resident’s note and agree with the documented findings and plan of care. Any areas of disagreement are noted on the chart. I was personally present for the key portions of any procedures. I have documented in the chart those procedures where I was not present during the key portions. I have reviewed the emergency nurses triage note. I agree with the chief complaint, past medical history, past surgical history, allergies, medications, social and family history as documented unless otherwise noted below.    For Physician Assistant/ Nurse Practitioner cases/documentation I have personally evaluated this patient and have completed at least one if not all key elements of the E/M (history, physical exam, and MDM). Additional findings are as noted.      Initial Screens:        Weston Coma Scale  Eye Opening: Spontaneous  Best Verbal Response: Oriented  Best Motor Response: Obeys commands  Weston Coma Scale Score: 15    Vitals:    Vitals:    05/28/24 1143 05/28/24 1151   BP: 103/61    Pulse: 54    Resp: 19    Temp: 97 °F (36.1 °C)    SpO2: 98%    Weight:  83.9 kg (185 lb)   Height:  1.88 m (6' 2\")       CHIEF COMPLAINT       Chief Complaint   Patient presents with    Chest Pain             DIAGNOSTIC RESULTS             RADIOLOGY:   No orders to display         LABS:  Labs Reviewed - No data to display      EMERGENCY DEPARTMENT COURSE:     -------------------------  BP: 103/61, Temp: 97 °F (36.1 °C), Pulse: 54, Respirations: 19      Comments    43 yo M with acute CP since 0900 today  Suellen woodruff  stabbing  Did not take anything  Now 4/10, pressure, L sided    Echo in 2022 EF 60%    Initially plan was for troponins and discharge due to the low heart score however blood pressure dropped into the high 30s and patient was asymptomatic at that time with a soft pressure.  After placement in observation and assessment of the observation team, patient developed lightheadedness and symptoms consistent with bradycardia and hypotension.  Admission changed to medicine, full EKG strip showed P waves with every QRS, does not appear to be heart block, will monitor closely for arrhythmia and consult cardiology    (Please note that portions of this note were completed with a voice recognition program.  Efforts were made to edit the dictations but occasionally words are mis-transcribed.)      Catalino Grimm MD,, MD  Attending Emergency Physician          Catalino Grimm MD  05/28/24 1953

## 2024-05-28 NOTE — PLAN OF CARE
Problem: Discharge Planning  Goal: Discharge to home or other facility with appropriate resources  Outcome: Progressing     Problem: Pain  Goal: Verbalizes/displays adequate comfort level or baseline comfort level  Outcome: Progressing     Problem: Chronic Conditions and Co-morbidities  Goal: Patient's chronic conditions and co-morbidity symptoms are monitored and maintained or improved  Outcome: Progressing     Problem: Cardiovascular - Adult  Goal: Maintains optimal cardiac output and hemodynamic stability  Outcome: Progressing  Goal: Absence of cardiac dysrhythmias or at baseline  Outcome: Progressing     Problem: Respiratory - Adult  Goal: Achieves optimal ventilation and oxygenation  Outcome: Progressing

## 2024-05-28 NOTE — H&P
[facility]     Department of Internal Medicine - Staff Internal Medicine Teaching Service          ADMISSION NOTE/HISTORY AND PHYSICAL EXAMINATION   Date: 5/28/2024  Patient Name: Meek Shi  Date of admission: 5/28/2024 11:45 AM  YOB: 1980  PCP: Macey Mcpherson DO  History Obtained From:  patient, electronic medical record    CHIEF COMPLAINT     Chief complaint: Chest pain    HISTORY OF PRESENTING ILLNESS     The patient is a pleasant 44 y.o. male with a MHx significant for     H/o Spontaneous pneumothorax  Chronic sinusitis s/p FESS    presents with a chief complaint of left-sided chest pain, started this morning, as he woke up and started doing his regular activities, non-radiating, non-reproducible, no aggravating or relieving factors.  No history of similar complaints in the past.  Complains of lightheadedness, denies any tinnitus, nausea or vomiting.  Patient denies fever, shortness of breath, palpitations or syncope  Denies any abdominal pain, urinary urgency or frequency.    At admission in the ED patient was hemodynamically stable.  Chest x-ray was negative for acute cardiopulmonary process.  Troponin negative.  TSH 0.26.    Review of Systems   Constitutional:  Negative for chills and fever.   HENT:  Negative for congestion, postnasal drip and rhinorrhea.    Respiratory:  Negative for cough and shortness of breath.    Cardiovascular:  Positive for chest pain.   Gastrointestinal:  Negative for abdominal pain and diarrhea.   Genitourinary:  Negative for dysuria.   Musculoskeletal:  Negative for arthralgias.   Neurological:  Positive for light-headedness. Negative for headaches.   Psychiatric/Behavioral:  Negative for agitation and confusion.      PAST MEDICAL HISTORY     Past Medical History:   Diagnosis Date    Abscess, mastoid, subperiosteal, right 10/2023    ADHD     As a kid    Asthma     Childhood.  Denies difficulty since then.  No current treatment.  use, 1 pack/day.  Alcohol: Reports no current alcohol use.  Illicits: Reports current marijuana use.      FAMILY HISTORY     Family History   Adopted: Yes   Problem Relation Age of Onset    Heart Disease Mother     Kidney stones Mother     Lymphoma Mother     Heart Disease Father     Suicide Father        PHYSICAL EXAM     Vitals: /69   Pulse (!) 45   Temp 97 °F (36.1 °C)   Resp 21   Ht 1.88 m (6' 2.02\")   Wt 83.9 kg (185 lb)   SpO2 95%   BMI 23.74 kg/m²   Tmax: Temp (24hrs), Av °F (36.1 °C), Min:97 °F (36.1 °C), Max:97 °F (36.1 °C)    Last Body weight:   Wt Readings from Last 3 Encounters:   24 83.9 kg (185 lb)   24 83.5 kg (184 lb)   24 83.5 kg (184 lb)     Body Mass Index : Body mass index is 23.74 kg/m².      PHYSICAL EXAMINATION:  Constitutional: This is a well developed, well nourished, 18.5-24.9 - Normal 44 y.o. year old male who is alert, oriented, cooperative and in no apparent distress.  Head:normocephalic and atraumatic.      Physical Exam  Constitutional:       General: He is not in acute distress.     Appearance: He is not ill-appearing.   HENT:      Head: Normocephalic and atraumatic.      Mouth/Throat:      Pharynx: Oropharynx is clear.   Eyes:      General: No scleral icterus.     Conjunctiva/sclera: Conjunctivae normal.   Cardiovascular:      Rate and Rhythm: Regular rhythm. Bradycardia present.      Heart sounds: Normal heart sounds.   Pulmonary:      Effort: Pulmonary effort is normal. No respiratory distress.      Breath sounds: No wheezing.   Abdominal:      General: There is no distension.      Palpations: Abdomen is soft.      Tenderness: There is no abdominal tenderness.   Musculoskeletal:      Right lower leg: No edema.      Left lower leg: No edema.   Skin:     General: Skin is warm.      Coloration: Skin is not jaundiced.      Findings: No bruising.   Neurological:      Mental Status: He is alert and oriented to person, place, and time.      Cranial

## 2024-05-28 NOTE — ED PROVIDER NOTES
Mental Status: He is alert and oriented to person, place, and time.           DDX/DIAGNOSTIC RESULTS / EMERGENCY DEPARTMENT COURSE / MDM     Medical Decision Making  44-year-old male presented to the ED with acute left-sided chest pain.  Patient smokes cigarettes.  Denies any recent use of cocaine or recreational drugs in the past.  Patient denies any limiting of alcohol.  Did not take any medications prior to arrival.  Last echo done in 2022 showed an EF of 60 to 65%.  Patient has PCP.  Patient stated he is due for cardiology evaluation for low heart rate.  Differential diagnosis ACS, GERD.  Will obtain cardiac workup.  Heart score of 2 prior to troponins.    Amount and/or Complexity of Data Reviewed  Labs: ordered. Decision-making details documented in ED Course.  Radiology: ordered. Decision-making details documented in ED Course.  ECG/medicine tests: ordered.    Risk  OTC drugs.  Prescription drug management.  Decision regarding hospitalization.        EKG  Sinus bradycardia  Rate 54    QRS 96  QT/QTc 458/434    All EKG's are interpreted by the Emergency Department Physician who either signs or Co-signs this chart in the absence of a cardiologist.    EMERGENCY DEPARTMENT COURSE:      ED Course as of 05/28/24 1611   Tue May 28, 2024   1208 CBC with Auto Differential(!):    WBC 7.5   RBC 4.07(!)   Hemoglobin Quant 12.3(!)   Hematocrit 38.2(!)   MCV 93.9   MCH 30.2   MCHC 32.2   RDW 12.3   Platelet Count 174   MPV 10.9   NRBC Automated 0.0   Neutrophils % 69(!)   Lymphocyte % 24   Monocytes % 5   Eosinophils % 1   Basophils % 1   Immature Granulocytes % 0   Neutrophils Absolute 5.21   Lymphocytes Absolute 1.82   Monocytes Absolute 0.36   Eosinophils Absolute 0.08   Basophils Absolute 0.04   Immature Granulocytes Absolute <0.03 [AS]   1210 Comprehensive Metabolic Panel(!):    Sodium 141   Potassium 4.2   Chloride 107   CARBON DIOXIDE 25   Anion Gap 9   Glucose 104(!)   BUN,BUNPL 13   Creatinine 0.9   Est,

## 2024-05-28 NOTE — ED NOTES
Patient arrived to ED with complaints of chest pain that started around 9 this morning. Patient states the only heart history he has is low heart rate during periods of rest. Patient denies SOB, dizziness. VSS. Patient ambulatory upon arrival to ED. Patient resting comfortably in bed with call light within reach.

## 2024-05-29 PROBLEM — R07.9 CHEST PAIN: Status: ACTIVE | Noted: 2024-05-29

## 2024-05-29 PROBLEM — E03.8 CENTRAL HYPOTHYROIDISM: Status: ACTIVE | Noted: 2024-05-29

## 2024-05-29 LAB
ALBUMIN SERPL-MCNC: 3.9 G/DL (ref 3.5–5.2)
ALBUMIN/GLOB SERPL: 2 {RATIO} (ref 1–2.5)
ALP SERPL-CCNC: 59 U/L (ref 40–129)
ALT SERPL-CCNC: <5 U/L (ref 10–50)
ANION GAP SERPL CALCULATED.3IONS-SCNC: 7 MMOL/L (ref 9–16)
AST SERPL-CCNC: 22 U/L (ref 10–50)
BASOPHILS # BLD: 0.05 K/UL (ref 0–0.2)
BASOPHILS NFR BLD: 1 % (ref 0–2)
BILIRUB DIRECT SERPL-MCNC: <0.2 MG/DL (ref 0–0.3)
BILIRUB INDIRECT SERPL-MCNC: ABNORMAL MG/DL (ref 0–1)
BILIRUB SERPL-MCNC: 0.5 MG/DL (ref 0–1.2)
BUN SERPL-MCNC: 12 MG/DL (ref 6–20)
CALCIUM SERPL-MCNC: 8.6 MG/DL (ref 8.6–10.4)
CHLORIDE SERPL-SCNC: 108 MMOL/L (ref 98–107)
CO2 SERPL-SCNC: 25 MMOL/L (ref 20–31)
CREAT SERPL-MCNC: 0.7 MG/DL (ref 0.7–1.2)
EOSINOPHIL # BLD: 0.17 K/UL (ref 0–0.44)
EOSINOPHILS RELATIVE PERCENT: 3 % (ref 1–4)
ERYTHROCYTE [DISTWIDTH] IN BLOOD BY AUTOMATED COUNT: 12.6 % (ref 11.8–14.4)
FERRITIN SERPL-MCNC: 59 NG/ML (ref 30–400)
FOLATE SERPL-MCNC: 6.8 NG/ML (ref 4.8–24.2)
GFR, ESTIMATED: >90 ML/MIN/1.73M2
GLOBULIN SER CALC-MCNC: 2 G/DL
GLUCOSE SERPL-MCNC: 98 MG/DL (ref 74–99)
HCT VFR BLD AUTO: 37.7 % (ref 40.7–50.3)
HGB BLD-MCNC: 11.8 G/DL (ref 13–17)
IMM GRANULOCYTES # BLD AUTO: <0.03 K/UL (ref 0–0.3)
IMM GRANULOCYTES NFR BLD: 0 %
IRON SATN MFR SERPL: 28 % (ref 20–55)
IRON SERPL-MCNC: 79 UG/DL (ref 61–157)
LACTIC ACID, WHOLE BLOOD: 0.7 MMOL/L (ref 0.7–2.1)
LYMPHOCYTES NFR BLD: 2.42 K/UL (ref 1.1–3.7)
LYMPHOCYTES RELATIVE PERCENT: 37 % (ref 24–43)
MCH RBC QN AUTO: 29.4 PG (ref 25.2–33.5)
MCHC RBC AUTO-ENTMCNC: 31.3 G/DL (ref 28.4–34.8)
MCV RBC AUTO: 94 FL (ref 82.6–102.9)
MONOCYTES NFR BLD: 0.5 K/UL (ref 0.1–1.2)
MONOCYTES NFR BLD: 8 % (ref 3–12)
NEUTROPHILS NFR BLD: 51 % (ref 36–65)
NEUTS SEG NFR BLD: 3.35 K/UL (ref 1.5–8.1)
NRBC BLD-RTO: 0 PER 100 WBC
PLATELET # BLD AUTO: 176 K/UL (ref 138–453)
PMV BLD AUTO: 10.8 FL (ref 8.1–13.5)
POTASSIUM SERPL-SCNC: 4.2 MMOL/L (ref 3.7–5.3)
PROT SERPL-MCNC: 5.9 G/DL (ref 6.6–8.7)
RBC # BLD AUTO: 4.01 M/UL (ref 4.21–5.77)
SODIUM SERPL-SCNC: 140 MMOL/L (ref 136–145)
T3FREE SERPL-MCNC: 3 PG/ML (ref 2–4.4)
TIBC SERPL-MCNC: 286 UG/DL (ref 250–450)
TRANSFERRIN SERPL-MCNC: 224 MG/DL (ref 200–360)
UNSATURATED IRON BINDING CAPACITY: 207 UG/DL (ref 112–347)
VIT B12 SERPL-MCNC: 1031 PG/ML (ref 232–1245)
WBC OTHER # BLD: 6.5 K/UL (ref 3.5–11.3)

## 2024-05-29 PROCEDURE — 83605 ASSAY OF LACTIC ACID: CPT

## 2024-05-29 PROCEDURE — 82746 ASSAY OF FOLIC ACID SERUM: CPT

## 2024-05-29 PROCEDURE — 80076 HEPATIC FUNCTION PANEL: CPT

## 2024-05-29 PROCEDURE — 6370000000 HC RX 637 (ALT 250 FOR IP)

## 2024-05-29 PROCEDURE — 99223 1ST HOSP IP/OBS HIGH 75: CPT | Performed by: HOSPITALIST

## 2024-05-29 PROCEDURE — 6370000000 HC RX 637 (ALT 250 FOR IP): Performed by: INTERNAL MEDICINE

## 2024-05-29 PROCEDURE — 80048 BASIC METABOLIC PNL TOTAL CA: CPT

## 2024-05-29 PROCEDURE — 2060000000 HC ICU INTERMEDIATE R&B

## 2024-05-29 PROCEDURE — 36415 COLL VENOUS BLD VENIPUNCTURE: CPT

## 2024-05-29 PROCEDURE — 84481 FREE ASSAY (FT-3): CPT

## 2024-05-29 PROCEDURE — 99223 1ST HOSP IP/OBS HIGH 75: CPT | Performed by: INTERNAL MEDICINE

## 2024-05-29 PROCEDURE — 83540 ASSAY OF IRON: CPT

## 2024-05-29 PROCEDURE — 84466 ASSAY OF TRANSFERRIN: CPT

## 2024-05-29 PROCEDURE — 82728 ASSAY OF FERRITIN: CPT

## 2024-05-29 PROCEDURE — 85025 COMPLETE CBC W/AUTO DIFF WBC: CPT

## 2024-05-29 PROCEDURE — 82607 VITAMIN B-12: CPT

## 2024-05-29 PROCEDURE — 83550 IRON BINDING TEST: CPT

## 2024-05-29 PROCEDURE — 2580000003 HC RX 258

## 2024-05-29 RX ORDER — THEOPHYLLINE 80 MG/15ML
80 SOLUTION ORAL EVERY 8 HOURS SCHEDULED
Status: DISCONTINUED | OUTPATIENT
Start: 2024-05-29 | End: 2024-05-30

## 2024-05-29 RX ADMIN — FAMOTIDINE 20 MG: 20 TABLET, FILM COATED ORAL at 09:23

## 2024-05-29 RX ADMIN — ACETAMINOPHEN 650 MG: 325 TABLET ORAL at 20:54

## 2024-05-29 RX ADMIN — SODIUM CHLORIDE, PRESERVATIVE FREE 10 ML: 5 INJECTION INTRAVENOUS at 09:24

## 2024-05-29 RX ADMIN — SODIUM CHLORIDE, PRESERVATIVE FREE 10 ML: 5 INJECTION INTRAVENOUS at 20:45

## 2024-05-29 RX ADMIN — THEOPHYLLINE ANHYDROUS 80 MG: 80 LIQUID ORAL at 13:41

## 2024-05-29 RX ADMIN — FAMOTIDINE 20 MG: 20 TABLET, FILM COATED ORAL at 20:45

## 2024-05-29 RX ADMIN — THEOPHYLLINE ANHYDROUS 80 MG: 80 LIQUID ORAL at 20:45

## 2024-05-29 ASSESSMENT — PAIN SCALES - WONG BAKER: WONGBAKER_NUMERICALRESPONSE: NO HURT

## 2024-05-29 ASSESSMENT — PAIN SCALES - GENERAL: PAINLEVEL_OUTOF10: 3

## 2024-05-29 NOTE — FLOWSHEET NOTE
05/29/24 0803   Vital Signs   Orthostatic B/P and Pulse? Yes   Blood Pressure Lying 104/58   Pulse Lying 35 PER MINUTE   Blood Pressure Sitting 95/58   Pulse Sitting 39 PER MINUTE   Blood Pressure Standing 97/57   Pulse Standing 57 PER MINUTE

## 2024-05-29 NOTE — PLAN OF CARE
Problem: Discharge Planning  Goal: Discharge to home or other facility with appropriate resources  5/29/2024 0459 by Juliocesar Castro RN  Outcome: Progressing  5/28/2024 1829 by Eleanor Guzman RN  Outcome: Progressing     Problem: Pain  Goal: Verbalizes/displays adequate comfort level or baseline comfort level  5/29/2024 0459 by Juliocesar Castro RN  Outcome: Progressing  5/28/2024 1829 by Eleanor Guzman RN  Outcome: Progressing     Problem: Chronic Conditions and Co-morbidities  Goal: Patient's chronic conditions and co-morbidity symptoms are monitored and maintained or improved  5/29/2024 0459 by Juliocesar Castro RN  Outcome: Progressing  5/28/2024 1829 by Eleanor Guzman RN  Outcome: Progressing     Problem: Cardiovascular - Adult  Goal: Maintains optimal cardiac output and hemodynamic stability  5/29/2024 0459 by Juliocesar Castro RN  Outcome: Progressing  5/28/2024 1829 by Eleanor Guzman RN  Outcome: Progressing  Goal: Absence of cardiac dysrhythmias or at baseline  5/29/2024 0459 by Juliocesar Castro RN  Outcome: Progressing  5/28/2024 1829 by Eleanor Guzman RN  Outcome: Progressing     Problem: Respiratory - Adult  Goal: Achieves optimal ventilation and oxygenation  5/29/2024 0459 by Juliocesar Castro RN  Outcome: Progressing  5/28/2024 1829 by Eleanor Guzman RN  Outcome: Progressing

## 2024-05-29 NOTE — CONSULTS
[facility]    Consult / H&P               Today's Date: 5/29/2024  Patient Name: Meek Shi  Date of admission: 5/28/2024 11:45 AM  Patient's age: 44 y.o., 1980  Admission Dx: Bradycardia [R00.1]  Symptomatic bradycardia [R00.1]  Chest pain, unspecified type [R07.9]    Requesting Physician: Kellie Wyman MD    Cardiac Evaluation Reason: Symptomatic bradycardia    History Obtained From: patient/chart review     History of Present Illness:    This patient 44 y.o. years old male with past medical history as below.     Patient with PMH of spontaneous pneumothorax, bradycardia, asthma, bipolar disorder, tobacco and marijuana abuse initially presented to ER with sudden onset and progressively worsening left-sided chest pain that is sharp and stabbing in nature.  Pain was nonradiating but complained of associated nausea vomiting and lightheadedness.  He denied diaphoresis, palpitations and shortness of breath.      On initial evaluation patient was hemodynamically stable. EKG obtained in ER showed sinus bradycardia with rate of 54 and no significant ST or T wave changes and no conduction delays or AV blocks.  High-sensitivity troponin negative x 2.  TSH and free T4 are low.  Chest x-ray with no acute processes.    Cardiology is consulted for evaluation for symptomatic bradycardia.  Currently he is not in sinus bradycardia but asymptomatic.  Patient had echocardiogram done yesterday that showed mildly reduced LVEF of 45 to 50% with normal wall motion.  He previously had cardiac Holter monitor in 11/2023 that showed variable rhythm of sinus bradycardia/sinus rhythm/sinus tachycardia.    Currently patient is denying chest pain and dizziness but becomes symptomatic if he moves around.  His heart rate goes down to 30s even when he is resting comfortably and then comes up to 50s.  His overnight telemetry shows sinus bradycardia with some sinus pauses but no AV blocks.    Past Medical

## 2024-05-29 NOTE — PROGRESS NOTES
Physical Therapy        Physical Therapy Cancel Note      DATE: 2024    NAME: Meek Shi  MRN: [id number]   : 1980      Patient not seen this date for Physical Therapy due to:    Patient independent with functional mobility. Will defer PT evaluation at this time. Please reorder PT if future needs arise.       Electronically signed by Chelsie Grubbs PT on 2024 at 2:41 PM

## 2024-05-29 NOTE — PLAN OF CARE
Problem: Discharge Planning  Goal: Discharge to home or other facility with appropriate resources  5/29/2024 1811 by Natalee Galvan RN  Outcome: Progressing  5/29/2024 0459 by Juliocesar Castro RN  Outcome: Progressing     Problem: Pain  Goal: Verbalizes/displays adequate comfort level or baseline comfort level  5/29/2024 1811 by Natalee Galvan RN  Outcome: Progressing  5/29/2024 0459 by Juilocesar Castro RN  Outcome: Progressing     Problem: Chronic Conditions and Co-morbidities  Goal: Patient's chronic conditions and co-morbidity symptoms are monitored and maintained or improved  5/29/2024 1811 by Natalee Galvan RN  Outcome: Progressing  5/29/2024 0459 by Juliocesar Castro RN  Outcome: Progressing     Problem: Cardiovascular - Adult  Goal: Maintains optimal cardiac output and hemodynamic stability  5/29/2024 1811 by Natalee Galvan RN  Outcome: Progressing  5/29/2024 0459 by Juliocesar Castro RN  Outcome: Progressing  Goal: Absence of cardiac dysrhythmias or at baseline  5/29/2024 1811 by Natalee Galvan RN  Outcome: Progressing  5/29/2024 0459 by Juliocesar Castro RN  Outcome: Progressing     Problem: Respiratory - Adult  Goal: Achieves optimal ventilation and oxygenation  5/29/2024 1811 by Natalee Galvan RN  Outcome: Progressing  5/29/2024 0459 by Juliocesar Castro RN  Outcome: Progressing     Problem: Infection - Adult  Goal: Absence of infection during hospitalization  Outcome: Progressing  Goal: Absence of fever/infection during anticipated neutropenic period  Outcome: Progressing

## 2024-05-29 NOTE — PROGRESS NOTES
Assessment: Patient was awake and alert when writer and  Soumya visited. Family was not present at the time. However, patient did make mention of family. Patient looked a bit anxious and when asked how he was feeling, patient responded, \"I am hungry.\" Patient said he was raised [employer] but not active in the [employer]. Patient declined prayer.   Intervention: Writer and  Soumya provided ministry of presence, offered support and reassured patient that he was in good hands.   Outcome: Patient expressed gratitude for the visit.   Plan: No follow up visits recommended unless called.

## 2024-05-29 NOTE — CARE COORDINATION
Case Management Assessment  Initial Evaluation    Date/Time of Evaluation: 5/29/2024 9:30AM  Assessment Completed by: Bernarda Saarvia RN    If patient is discharged prior to next notation, then this note serves as note for discharge by case management.    Patient Name: Meek Shi                   YOB: 1980  Diagnosis: Bradycardia [R00.1]  Symptomatic bradycardia [R00.1]  Chest pain, unspecified type [R07.9]                   Date / Time: 5/28/2024 11:45 AM    Patient Admission Status: Inpatient   Readmission Risk (Low < 19, Mod (19-27), High > 27): Readmission Risk Score: 4.9    Current PCP: Macey Mcpherson, DO  PCP verified by CM? (P) Yes (Macey Mcpherson DO)    Chart Reviewed: Yes      History Provided by: (P) Patient  Patient Orientation: (P) Alert and Oriented, Person, Place, Situation    Patient Cognition: (P) Alert    Hospitalization in the last 30 days (Readmission):  No    If yes, Readmission Assessment in CM Navigator will be completed.    Advance Directives:      Code Status: Full Code   Patient's Primary Decision Maker is: (P) Legal Next of Kin      Discharge Planning:    Patient lives with: (P) Friends Type of Home: (P) House  Primary Care Giver: (P) Self  Patient Support Systems include: (P) Parent, Family Members, Friends/Neighbors   Current Financial resources: (P) Medicaid  Current community resources:    Current services prior to admission: (P) None            Current DME:              Type of Home Care services:  (P) None    ADLS  Prior functional level: (P) Independent in ADLs/IADLs  Current functional level: (P) Independent in ADLs/IADLs    PT AM-PAC:   /24  OT AM-PAC:   /24    Family can provide assistance at DC: (P) No  Would you like Case Management to discuss the discharge plan with any other family members/significant others, and if so, who? (P) No  Plans to Return to Present Housing: (P) Yes (in the process of moving)  Other Identified Issues/Barriers to RETURNING to current

## 2024-05-29 NOTE — PROGRESS NOTES
[facility]  Occupational Therapy Not Seen Note    DATE: 2024    NAME: Meek Shi  MRN: [id number]   : 1980      Patient not seen this date for Occupational Therapy due to:    Patient reports independent with ADLs and functional tasks and denies acute OT needs. RN reports pt has been getting self up and going to restroom frequently with no assist required. Will defer OT evaluation at this time. Please reorder OT if future needs arise.     Electronically signed by Leslie Islas OT on 2024 at 12:04 PM

## 2024-05-30 ENCOUNTER — APPOINTMENT (OUTPATIENT)
Dept: MRI IMAGING | Age: 44
End: 2024-05-30
Payer: COMMERCIAL

## 2024-05-30 PROBLEM — I24.9 ACS (ACUTE CORONARY SYNDROME) (HCC): Status: ACTIVE | Noted: 2024-05-30

## 2024-05-30 LAB
ANION GAP SERPL CALCULATED.3IONS-SCNC: 10 MMOL/L (ref 9–16)
BASOPHILS # BLD: 0.05 K/UL (ref 0–0.2)
BASOPHILS NFR BLD: 1 % (ref 0–2)
BUN SERPL-MCNC: 11 MG/DL (ref 6–20)
CALCIUM SERPL-MCNC: 8.6 MG/DL (ref 8.6–10.4)
CHLORIDE SERPL-SCNC: 105 MMOL/L (ref 98–107)
CO2 SERPL-SCNC: 24 MMOL/L (ref 20–31)
CORTIS SERPL-MCNC: 0.8 UG/DL (ref 2.5–19.5)
CREAT SERPL-MCNC: 0.7 MG/DL (ref 0.7–1.2)
D DIMER PPP FEU-MCNC: <0.27 UG/ML FEU (ref 0–0.57)
EKG ATRIAL RATE: 40 BPM
EKG P AXIS: 76 DEGREES
EKG P-R INTERVAL: 166 MS
EKG Q-T INTERVAL: 504 MS
EKG QRS DURATION: 100 MS
EKG QTC CALCULATION (BAZETT): 410 MS
EKG R AXIS: 43 DEGREES
EKG T AXIS: 44 DEGREES
EKG VENTRICULAR RATE: 40 BPM
EOSINOPHIL # BLD: 0.19 K/UL (ref 0–0.44)
EOSINOPHILS RELATIVE PERCENT: 3 % (ref 1–4)
ERYTHROCYTE [DISTWIDTH] IN BLOOD BY AUTOMATED COUNT: 12.4 % (ref 11.8–14.4)
GFR, ESTIMATED: >90 ML/MIN/1.73M2
GLUCOSE SERPL-MCNC: 105 MG/DL (ref 74–99)
HCT VFR BLD AUTO: 38.8 % (ref 40.7–50.3)
HGB BLD-MCNC: 12.4 G/DL (ref 13–17)
IMM GRANULOCYTES # BLD AUTO: <0.03 K/UL (ref 0–0.3)
IMM GRANULOCYTES NFR BLD: 0 %
LYMPHOCYTES NFR BLD: 3.09 K/UL (ref 1.1–3.7)
LYMPHOCYTES RELATIVE PERCENT: 44 % (ref 24–43)
MCH RBC QN AUTO: 29.7 PG (ref 25.2–33.5)
MCHC RBC AUTO-ENTMCNC: 32 G/DL (ref 28.4–34.8)
MCV RBC AUTO: 92.8 FL (ref 82.6–102.9)
MONOCYTES NFR BLD: 0.54 K/UL (ref 0.1–1.2)
MONOCYTES NFR BLD: 8 % (ref 3–12)
NEUTROPHILS NFR BLD: 44 % (ref 36–65)
NEUTS SEG NFR BLD: 3 K/UL (ref 1.5–8.1)
NRBC BLD-RTO: 0 PER 100 WBC
PLATELET # BLD AUTO: 183 K/UL (ref 138–453)
PMV BLD AUTO: 10.8 FL (ref 8.1–13.5)
POTASSIUM SERPL-SCNC: 3.7 MMOL/L (ref 3.7–5.3)
PROLACTIN SERPL-MCNC: 14.4 NG/ML (ref 4.04–15.2)
RBC # BLD AUTO: 4.18 M/UL (ref 4.21–5.77)
SHBG SERPL-SCNC: 60 NMOL/L (ref 17–56)
SODIUM SERPL-SCNC: 139 MMOL/L (ref 136–145)
SOMATOMEDIN C: 110 NG/ML (ref 92.4–221)
TESTOST FREE MFR SERPL: 58.7 PG/ML (ref 47–244)
TESTOST SERPL-MCNC: 430 NG/DL (ref 249–836)
WBC OTHER # BLD: 6.9 K/UL (ref 3.5–11.3)

## 2024-05-30 PROCEDURE — 84270 ASSAY OF SEX HORMONE GLOBUL: CPT

## 2024-05-30 PROCEDURE — 85025 COMPLETE CBC W/AUTO DIFF WBC: CPT

## 2024-05-30 PROCEDURE — 6370000000 HC RX 637 (ALT 250 FOR IP): Performed by: INTERNAL MEDICINE

## 2024-05-30 PROCEDURE — A9576 INJ PROHANCE MULTIPACK: HCPCS | Performed by: HOSPITALIST

## 2024-05-30 PROCEDURE — 2580000003 HC RX 258

## 2024-05-30 PROCEDURE — 84146 ASSAY OF PROLACTIN: CPT

## 2024-05-30 PROCEDURE — 6370000000 HC RX 637 (ALT 250 FOR IP)

## 2024-05-30 PROCEDURE — 80048 BASIC METABOLIC PNL TOTAL CA: CPT

## 2024-05-30 PROCEDURE — 84305 ASSAY OF SOMATOMEDIN: CPT

## 2024-05-30 PROCEDURE — 36415 COLL VENOUS BLD VENIPUNCTURE: CPT

## 2024-05-30 PROCEDURE — 2580000003 HC RX 258: Performed by: HOSPITALIST

## 2024-05-30 PROCEDURE — 82533 TOTAL CORTISOL: CPT

## 2024-05-30 PROCEDURE — 70553 MRI BRAIN STEM W/O & W/DYE: CPT

## 2024-05-30 PROCEDURE — 82397 CHEMILUMINESCENT ASSAY: CPT

## 2024-05-30 PROCEDURE — 99233 SBSQ HOSP IP/OBS HIGH 50: CPT | Performed by: INTERNAL MEDICINE

## 2024-05-30 PROCEDURE — 2060000000 HC ICU INTERMEDIATE R&B

## 2024-05-30 PROCEDURE — 82024 ASSAY OF ACTH: CPT

## 2024-05-30 PROCEDURE — 99233 SBSQ HOSP IP/OBS HIGH 50: CPT | Performed by: NURSE PRACTITIONER

## 2024-05-30 PROCEDURE — 85379 FIBRIN DEGRADATION QUANT: CPT

## 2024-05-30 PROCEDURE — 84403 ASSAY OF TOTAL TESTOSTERONE: CPT

## 2024-05-30 PROCEDURE — 6360000004 HC RX CONTRAST MEDICATION: Performed by: HOSPITALIST

## 2024-05-30 RX ORDER — FLUDROCORTISONE ACETATE 0.1 MG/1
0.1 TABLET ORAL DAILY
Status: DISCONTINUED | OUTPATIENT
Start: 2024-05-30 | End: 2024-05-31

## 2024-05-30 RX ORDER — SODIUM CHLORIDE 0.9 % (FLUSH) 0.9 %
10 SYRINGE (ML) INJECTION PRN
Status: DISCONTINUED | OUTPATIENT
Start: 2024-05-30 | End: 2024-05-31 | Stop reason: HOSPADM

## 2024-05-30 RX ORDER — THEOPHYLLINE 80 MG/15ML
100 SOLUTION ORAL EVERY 8 HOURS SCHEDULED
Status: DISCONTINUED | OUTPATIENT
Start: 2024-05-30 | End: 2024-05-31

## 2024-05-30 RX ADMIN — SODIUM CHLORIDE, PRESERVATIVE FREE 10 ML: 5 INJECTION INTRAVENOUS at 21:20

## 2024-05-30 RX ADMIN — POLYETHYLENE GLYCOL 3350 17 G: 17 POWDER, FOR SOLUTION ORAL at 08:03

## 2024-05-30 RX ADMIN — FAMOTIDINE 20 MG: 20 TABLET, FILM COATED ORAL at 08:03

## 2024-05-30 RX ADMIN — SODIUM CHLORIDE, PRESERVATIVE FREE 10 ML: 5 INJECTION INTRAVENOUS at 08:03

## 2024-05-30 RX ADMIN — FAMOTIDINE 20 MG: 20 TABLET, FILM COATED ORAL at 21:20

## 2024-05-30 RX ADMIN — GADOTERIDOL 16 ML: 279.3 INJECTION, SOLUTION INTRAVENOUS at 12:07

## 2024-05-30 RX ADMIN — SODIUM CHLORIDE, PRESERVATIVE FREE 10 ML: 5 INJECTION INTRAVENOUS at 12:08

## 2024-05-30 RX ADMIN — THEOPHYLLINE ANHYDROUS 80 MG: 80 LIQUID ORAL at 05:56

## 2024-05-30 RX ADMIN — THEOPHYLLINE 100 MG: 80 SOLUTION ORAL at 15:59

## 2024-05-30 RX ADMIN — THEOPHYLLINE 100 MG: 80 SOLUTION ORAL at 21:51

## 2024-05-30 RX ADMIN — FLUDROCORTISONE ACETATE 0.1 MG: 0.1 TABLET ORAL at 08:03

## 2024-05-30 NOTE — PLAN OF CARE
Problem: Discharge Planning  Goal: Discharge to home or other facility with appropriate resources  5/30/2024 1824 by Natalee Galvan RN  Outcome: Progressing  5/30/2024 0617 by Juliocesar Castro RN  Outcome: Progressing     Problem: Pain  Goal: Verbalizes/displays adequate comfort level or baseline comfort level  5/30/2024 1824 by Natalee Galvan RN  Outcome: Progressing  5/30/2024 0617 by Juliocesar Castro RN  Outcome: Progressing     Problem: Chronic Conditions and Co-morbidities  Goal: Patient's chronic conditions and co-morbidity symptoms are monitored and maintained or improved  5/30/2024 1824 by Natalee aGlvan RN  Outcome: Progressing  5/30/2024 0617 by Juliocesar Castro RN  Outcome: Progressing     Problem: Cardiovascular - Adult  Goal: Maintains optimal cardiac output and hemodynamic stability  5/30/2024 1824 by Natalee Galvan RN  Outcome: Progressing  5/30/2024 0617 by Juliocesar Castro RN  Outcome: Progressing  Goal: Absence of cardiac dysrhythmias or at baseline  5/30/2024 1824 by Natalee Galvan RN  Outcome: Progressing  5/30/2024 0617 by Juliocesar Castro RN  Outcome: Progressing     Problem: Respiratory - Adult  Goal: Achieves optimal ventilation and oxygenation  5/30/2024 1824 by Natalee Galvan RN  Outcome: Progressing  5/30/2024 0617 by Juliocesar Castro RN  Outcome: Progressing     Problem: Infection - Adult  Goal: Absence of infection during hospitalization  5/30/2024 1824 by Natalee Galvan RN  Outcome: Progressing  5/30/2024 0617 by Juliocesar Castro RN  Outcome: Progressing  Goal: Absence of fever/infection during anticipated neutropenic period  5/30/2024 1824 by Natalee Galvan RN  Outcome: Progressing  5/30/2024 0617 by Juliocesar Castro RN  Outcome: Progressing     Problem: Gastrointestinal - Adult  Goal: Maintains or returns to baseline bowel function  Outcome: Progressing

## 2024-05-30 NOTE — PROGRESS NOTES
[facility]  Internal Medicine Teaching Residency Program  Inpatient Daily Progress Note  ______________________________________________________________________________    Patient: Meek Shi  YOB: 1980   MRN:[id number]    Acct: [id number]     Room: [facility]/0533-01  Admit date: 2024  Today's date: 24  Number of days in the hospital: 2    SUBJECTIVE   Admitting Diagnosis: Bradycardia  CC: chest pain    No acute events overnight.  Continues to be bradycardic 40's.  EP consulted, started on theophylline and florinef  Patient has symptoms of hypothyroidism like cold intolerance, constipation.  TSH 0.26  Cortisol 0.8  MRI brain negative for any pituitary mass or lesions      BRIEF HISTORY     The patient is a pleasant 44 y.o. male with a MHx significant for      H/o Spontaneous pneumothorax  Chronic sinusitis s/p FESS     presents with a chief complaint of left-sided chest pain, started this morning, as he woke up and started doing his regular activities, non-radiating, non-reproducible, no aggravating or relieving factors.  No history of similar complaints in the past.  Complains of lightheadedness, denies any tinnitus, nausea or vomiting.  Patient denies fever, shortness of breath, palpitations or syncope  Denies any abdominal pain, urinary urgency or frequency.     At admission in the ED patient was hemodynamically stable.  Chest x-ray was negative for acute cardiopulmonary process.  Troponin negative.  TSH 0.26.    OBJECTIVE     Vital Signs:  /64   Pulse (!) 42   Temp 98.5 °F (36.9 °C) (Oral)   Resp 14   Ht 1.88 m (6' 2\")   Wt 84.3 kg (185 lb 13.6 oz)   SpO2 98%   BMI 23.86 kg/m²     Temp (24hrs), Av °F (36.7 °C), Min:97.6 °F (36.4 °C), Max:98.6 °F (37 °C)    In: -   Out: 850 [Urine:850]    Physical Exam:  Physical Exam  Constitutional:       General: He is not in acute distress.     Appearance: He is not ill-appearing.   HENT:

## 2024-05-30 NOTE — PROGRESS NOTES
[facility]   Progress Note                   Date:   5/30/2024  Patient name: Meek Shi  Date of admission:  5/28/2024 11:45 AM  MRN:   [id number]  YOB: 1980  PCP: Macey Mcpherson DO    Reason for Admission: Bradycardia [R00.1]  Symptomatic bradycardia [R00.1]  Chest pain, unspecified type [R07.9]    Subjective:       Clinical Changes / Abnormalities: Pt seen and examined in the room.  Patient resting in bed. Pt denies any CP or sob.  Labs, vitals and tele reviewed- SB 39-50s    Medications:   Scheduled Meds:   theophylline  100 mg Oral 3 times per day    fludrocortisone  0.1 mg Oral Daily    famotidine  20 mg Oral BID    sodium chloride flush  5-40 mL IntraVENous 2 times per day    sodium chloride flush  5-40 mL IntraVENous 2 times per day    enoxaparin  40 mg SubCUTAneous Daily    nicotine  1 patch TransDERmal Daily     Continuous Infusions:   sodium chloride       CBC:   Recent Labs     05/28/24  1208 05/29/24  0737 05/30/24  0213   WBC 7.5 6.5 6.9   HGB 12.3* 11.8* 12.4*    176 183     BMP:    Recent Labs     05/28/24  1208 05/29/24  0737 05/30/24  0213    140 139   K 4.2 4.2 3.7    108* 105   CO2 25 25 24   BUN 13 12 11   CREATININE 0.9 0.7 0.7   GLUCOSE 104* 98 105*     Hepatic:   Recent Labs     05/28/24  1208 05/29/24  0737   AST 19 22   ALT 9* <5*   BILITOT 0.4 0.5   ALKPHOS 66 59     Troponin:   Recent Labs     05/28/24  1315 05/28/24  1821 05/28/24  1942   TROPHS <6 <6 <6     BNP: No results for input(s): \"BNP\" in the last 72 hours.  Lipids: No results for input(s): \"CHOL\", \"HDL\" in the last 72 hours.    Invalid input(s): \"LDLCALCU\"  INR:   Recent Labs     05/28/24  1821   INR 1.0       Objective:   Vitals: BP (!) 101/57   Pulse (!) 42   Temp 97.9 °F (36.6 °C) (Oral)   Resp 12   Ht 1.88 m (6' 2\")   Wt 84.3 kg (185 lb 13.6 oz)   SpO2 97%   BMI 23.86 kg/m²   General appearance: alert and cooperative with exam  HEENT: Head: Normocephalic, no  average HR 72 bpm on Holter monitor 10/31/24  Chronic asthma  Atypical chest pain    Patient Active Problem List:     Bradycardia     Nicotine dependence, cigarettes, uncomplicated     COVID     Spontaneous pneumothorax     History of syncope     Symptomatic bradycardia     Chest pain     Central hypothyroidism     ACS (acute coronary syndrome) (HCC)      Plan of Treatment:   Continue theophylline  Continue fludrocortisone  Per EP-ok for patient to be discharged per CV standpoint and for him to follow up outpatient.     Electronically signed by ALBERT Street CNP on 5/30/2024 at 11:04 AM  [facility]  [phone]

## 2024-05-30 NOTE — PLAN OF CARE
Problem: Discharge Planning  Goal: Discharge to home or other facility with appropriate resources  5/30/2024 0617 by Juliocesar Castro RN  Outcome: Progressing  5/29/2024 1811 by Natalee Galvan RN  Outcome: Progressing     Problem: Pain  Goal: Verbalizes/displays adequate comfort level or baseline comfort level  5/30/2024 0617 by Juliocesar Castro RN  Outcome: Progressing  5/29/2024 1811 by Natalee Galvan RN  Outcome: Progressing     Problem: Chronic Conditions and Co-morbidities  Goal: Patient's chronic conditions and co-morbidity symptoms are monitored and maintained or improved  5/30/2024 0617 by Juliocesar Castro RN  Outcome: Progressing  5/29/2024 1811 by Natalee Galvan RN  Outcome: Progressing     Problem: Cardiovascular - Adult  Goal: Maintains optimal cardiac output and hemodynamic stability  5/30/2024 0617 by Juliocesar Castro RN  Outcome: Progressing  5/29/2024 1811 by Natalee Galvan RN  Outcome: Progressing  Goal: Absence of cardiac dysrhythmias or at baseline  5/30/2024 0617 by Juliocesar Castro RN  Outcome: Progressing  5/29/2024 1811 by Natalee Galvan RN  Outcome: Progressing     Problem: Respiratory - Adult  Goal: Achieves optimal ventilation and oxygenation  5/30/2024 0617 by Juliocesar Castro RN  Outcome: Progressing  5/29/2024 1811 by Natalee Galvan RN  Outcome: Progressing     Problem: Infection - Adult  Goal: Absence of infection during hospitalization  5/30/2024 0617 by Juliocesar Castro RN  Outcome: Progressing  5/29/2024 1811 by Natalee Galvan RN  Outcome: Progressing  Goal: Absence of fever/infection during anticipated neutropenic period  5/30/2024 0617 by Juliocesar Castro RN  Outcome: Progressing  5/29/2024 1811 by Natalee Galvan RN  Outcome: Progressing

## 2024-05-30 NOTE — CONSULTS
[facility]  Inpatient Cardiology Consult             Date:   5/29/24  Patient name: Meek Shi  Date of admission:  5/28/2024 11:45 AM  MRN:   [id number]  YOB: 1980      Reason for consultation:  Sinus bradycardia    CHIEF COMPLAINT:   Atypical chest pain    History Obtained From:  Patient and medical record    HISTORY OF PRESENT ILLNESS:      The patient is a 44 y.o gentleman with h/o bipolar disorder, asthma, spontaneous pneumothorax and chronic sinusitis admitted from the ED after presenting with sharp left sided chest discomfort.  CXR showed no significant abnormalities, and EKG showed no significant ST or T wave changes with serial troponins all less than 6 ng/L.  He has longstanding sinus bradycardia with resting HR as low as 40 bpm, but remains active with no exertional CP or SOB.  He notes some lightheadedness particularly with standing up, and has a distant h/o syncope.      Past Medical History:   has a past medical history of Abscess, mastoid, subperiosteal, right, ADHD, Asthma, Bipolar disorder (HCC), Bradycardia, COVID-19, Lumbar sprain, Major depression, Metal foreign body in eye region, Orbital cellulitis on right, Poor dentition, Spontaneous pneumothorax, Under care of team, Under care of team, Under care of team, and Under care of team.    Past Surgical History:   has a past surgical history that includes Hand surgery (Right); Maxillary antrostomy (Right, 10/03/2023); sinusotomy (10/03/2023); sinus surgery (01/24/2024); and Sinus endoscopy (N/A, 1/24/2024).     Home Medications:    Prior to Admission medications    Medication Sig Start Date End Date Taking? Authorizing Provider   sodium chloride (ALTAMIST SPRAY) 0.65 % nasal spray 2 sprays by Nasal route 4 times daily 3/11/24   Macey Mcpherson,    acetaminophen (AMINOFEN) 325 MG tablet Take 2 tablets by mouth every 6 hours as needed for Pain  Patient not taking: Reported on 5/28/2024 1/23/24   Igor Silva  nursing.    Electronically signed by Glen Jackson MD on 5/30/2024 at 7:26 AM.  [facility]

## 2024-05-31 VITALS
HEART RATE: 48 BPM | SYSTOLIC BLOOD PRESSURE: 106 MMHG | BODY MASS INDEX: 23.85 KG/M2 | HEIGHT: 74 IN | TEMPERATURE: 97.8 F | DIASTOLIC BLOOD PRESSURE: 60 MMHG | RESPIRATION RATE: 17 BRPM | OXYGEN SATURATION: 95 % | WEIGHT: 185.85 LBS

## 2024-05-31 LAB
ACTH PLAS-MCNC: 21 PG/ML (ref 7–63)
ANION GAP SERPL CALCULATED.3IONS-SCNC: 8 MMOL/L (ref 9–16)
BASOPHILS # BLD: 0.08 K/UL (ref 0–0.2)
BASOPHILS NFR BLD: 1 % (ref 0–2)
BUN SERPL-MCNC: 15 MG/DL (ref 6–20)
CALCIUM SERPL-MCNC: 9.3 MG/DL (ref 8.6–10.4)
CHLORIDE SERPL-SCNC: 104 MMOL/L (ref 98–107)
CO2 SERPL-SCNC: 25 MMOL/L (ref 20–31)
CREAT SERPL-MCNC: 0.8 MG/DL (ref 0.7–1.2)
EOSINOPHIL # BLD: 0.21 K/UL (ref 0–0.44)
EOSINOPHILS RELATIVE PERCENT: 3 % (ref 1–4)
ERYTHROCYTE [DISTWIDTH] IN BLOOD BY AUTOMATED COUNT: 12.3 % (ref 11.8–14.4)
GFR, ESTIMATED: >90 ML/MIN/1.73M2
GLUCOSE SERPL-MCNC: 97 MG/DL (ref 74–99)
HCT VFR BLD AUTO: 42.6 % (ref 40.7–50.3)
HGB BLD-MCNC: 13.6 G/DL (ref 13–17)
IMM GRANULOCYTES # BLD AUTO: <0.03 K/UL (ref 0–0.3)
IMM GRANULOCYTES NFR BLD: 0 %
LYMPHOCYTES NFR BLD: 2.95 K/UL (ref 1.1–3.7)
LYMPHOCYTES RELATIVE PERCENT: 39 % (ref 24–43)
MCH RBC QN AUTO: 29.8 PG (ref 25.2–33.5)
MCHC RBC AUTO-ENTMCNC: 31.9 G/DL (ref 28.4–34.8)
MCV RBC AUTO: 93.2 FL (ref 82.6–102.9)
MONOCYTES NFR BLD: 0.63 K/UL (ref 0.1–1.2)
MONOCYTES NFR BLD: 8 % (ref 3–12)
NEUTROPHILS NFR BLD: 49 % (ref 36–65)
NEUTS SEG NFR BLD: 3.72 K/UL (ref 1.5–8.1)
NRBC BLD-RTO: 0 PER 100 WBC
PLATELET # BLD AUTO: 178 K/UL (ref 138–453)
PMV BLD AUTO: 11 FL (ref 8.1–13.5)
POTASSIUM SERPL-SCNC: 4.3 MMOL/L (ref 3.7–5.3)
RBC # BLD AUTO: 4.57 M/UL (ref 4.21–5.77)
SODIUM SERPL-SCNC: 137 MMOL/L (ref 136–145)
WBC OTHER # BLD: 7.6 K/UL (ref 3.5–11.3)

## 2024-05-31 PROCEDURE — 99233 SBSQ HOSP IP/OBS HIGH 50: CPT | Performed by: NURSE PRACTITIONER

## 2024-05-31 PROCEDURE — 6370000000 HC RX 637 (ALT 250 FOR IP)

## 2024-05-31 PROCEDURE — 36415 COLL VENOUS BLD VENIPUNCTURE: CPT

## 2024-05-31 PROCEDURE — 85025 COMPLETE CBC W/AUTO DIFF WBC: CPT

## 2024-05-31 PROCEDURE — 6370000000 HC RX 637 (ALT 250 FOR IP): Performed by: INTERNAL MEDICINE

## 2024-05-31 PROCEDURE — 2580000003 HC RX 258

## 2024-05-31 PROCEDURE — 99233 SBSQ HOSP IP/OBS HIGH 50: CPT | Performed by: INTERNAL MEDICINE

## 2024-05-31 PROCEDURE — 80048 BASIC METABOLIC PNL TOTAL CA: CPT

## 2024-05-31 RX ORDER — LEVOTHYROXINE SODIUM 0.05 MG/1
50 TABLET ORAL DAILY
Qty: 30 TABLET | Refills: 4 | Status: SHIPPED | OUTPATIENT
Start: 2024-06-01 | End: 2024-06-05 | Stop reason: SINTOL

## 2024-05-31 RX ORDER — HYDROCORTISONE 10 MG/1
50 TABLET ORAL DAILY
Status: DISCONTINUED | OUTPATIENT
Start: 2024-05-31 | End: 2024-05-31 | Stop reason: HOSPADM

## 2024-05-31 RX ORDER — HYDROCORTISONE 10 MG/1
50 TABLET ORAL DAILY
Qty: 90 TABLET | Refills: 4 | Status: SHIPPED | OUTPATIENT
Start: 2024-06-01 | End: 2024-06-05 | Stop reason: SINTOL

## 2024-05-31 RX ORDER — LEVOTHYROXINE SODIUM 0.05 MG/1
50 TABLET ORAL DAILY
Status: DISCONTINUED | OUTPATIENT
Start: 2024-05-31 | End: 2024-05-31 | Stop reason: HOSPADM

## 2024-05-31 RX ADMIN — LEVOTHYROXINE SODIUM 50 MCG: 50 TABLET ORAL at 12:12

## 2024-05-31 RX ADMIN — SODIUM CHLORIDE, PRESERVATIVE FREE 5 ML: 5 INJECTION INTRAVENOUS at 09:00

## 2024-05-31 RX ADMIN — SODIUM CHLORIDE, PRESERVATIVE FREE 10 ML: 5 INJECTION INTRAVENOUS at 08:02

## 2024-05-31 RX ADMIN — FLUDROCORTISONE ACETATE 0.1 MG: 0.1 TABLET ORAL at 07:59

## 2024-05-31 RX ADMIN — FAMOTIDINE 20 MG: 20 TABLET, FILM COATED ORAL at 08:00

## 2024-05-31 RX ADMIN — THEOPHYLLINE 100 MG: 80 SOLUTION ORAL at 06:37

## 2024-05-31 RX ADMIN — HYDROCORTISONE 50 MG: 10 TABLET ORAL at 12:12

## 2024-05-31 NOTE — PLAN OF CARE
or deterioration   Update acute care plan with appropriate goals if chronic or comorbid symptoms are exacerbated and prevent overall improvement and discharge  5/30/2024 1824 by Natalee Galvan RN  Outcome: Progressing     Problem: Cardiovascular - Adult  Goal: Maintains optimal cardiac output and hemodynamic stability  5/31/2024 0457 by Luz Maria Sin RN  Outcome: Progressing  5/30/2024 1824 by Natalee Galvan RN  Outcome: Progressing  Goal: Absence of cardiac dysrhythmias or at baseline  5/31/2024 0457 by Luz Maria Sin RN  Outcome: Progressing  5/30/2024 1824 by Natalee Galvan RN  Outcome: Progressing     Problem: Respiratory - Adult  Goal: Achieves optimal ventilation and oxygenation  5/31/2024 0457 by Luz Maria Sin RN  Outcome: Progressing  5/30/2024 1824 by Natalee Galvan RN  Outcome: Progressing     Problem: Infection - Adult  Goal: Absence of infection during hospitalization  5/31/2024 0457 by Luz Maria Sin RN  Outcome: Progressing  Flowsheets  Taken 5/31/2024 0400  Absence of infection during hospitalization:   Assess and monitor for signs and symptoms of infection   Monitor lab/diagnostic results   Monitor all insertion sites i.e., indwelling lines, tubes and drains   Monitor endotracheal (as able) and nasal secretions for changes in amount and color   [place] appropriate cooling/warming therapies per order   Administer medications as ordered   Instruct and encourage patient and family to use good hand hygiene technique   Identify and instruct in appropriate isolation precautions for identified infection/condition  Taken 5/30/2024 2000  Absence of infection during hospitalization:   Assess and monitor for signs and symptoms of infection   Monitor lab/diagnostic results   Monitor all insertion sites i.e., indwelling lines, tubes and drains   Monitor endotracheal (as able) and nasal secretions for changes in amount and color   [place] appropriate cooling/warming therapies per  order  5/30/2024 1824 by Natalee Galvan, RN  Outcome: Progressing  Goal: Absence of fever/infection during anticipated neutropenic period  5/31/2024 0457 by Luz Maria Sin RN  Outcome: Progressing  Flowsheets  Taken 5/31/2024 0400  Absence of fever/infection during anticipated neutropenic period:   Monitor white blood cell count   Implement neutropenic guidelines   Administer growth factors as ordered  Taken 5/30/2024 2000  Absence of fever/infection during anticipated neutropenic period: Monitor white blood cell count  5/30/2024 1824 by Natalee Galvan, RN  Outcome: Progressing     Problem: Gastrointestinal - Adult  Goal: Maintains or returns to baseline bowel function  5/31/2024 0457 by Luz Maria Sin, RN  Outcome: Progressing  5/30/2024 1824 by Natalee Galvan RN  Outcome: Progressing

## 2024-05-31 NOTE — PLAN OF CARE
Problem: Infection - Adult  Goal: Absence of fever/infection during anticipated neutropenic period  Recent Flowsheet Documentation  Taken 5/31/2024 0800 by Kait Rodriguez, RN  Absence of fever/infection during anticipated neutropenic period:   Monitor white blood cell count   Administer growth factors as ordered  5/31/2024 0457 by Luz Maria Sin, RN  Outcome: Progressing  Flowsheets  Taken 5/31/2024 0400  Absence of fever/infection during anticipated neutropenic period:   Monitor white blood cell count   Implement neutropenic guidelines   Administer growth factors as ordered  Taken 5/30/2024 2000  Absence of fever/infection during anticipated neutropenic period: Monitor white blood cell count

## 2024-05-31 NOTE — DISCHARGE SUMMARY
[facility]     Department of Internal Medicine - Staff Internal Medicine Teaching Service    INPATIENT DISCHARGE SUMMARY      Patient Identification:  Meek Shi is a 44 y.o. male.  :  1980  MRN: [id number]     Acct: [id number]   PCP: Macey Mcpherson DO  Admit Date:  2024  Discharge date and time: 2024  3:39 PM   Attending Provider: No att. providers found                                     ACTIVE DISCHARGE DIAGNOSES     Hospital Problem Lists:  Principal Problem:    Bradycardia  Active Problems:    Symptomatic bradycardia    Chest pain    Central hypothyroidism    ACS (acute coronary syndrome) ([facility])  Resolved Problems:    * No resolved hospital problems. *      HOSPITAL STAY     Brief Inpatient course:   Meek Shi is a 44 y.o. male who was admitted for the management of Bradycardia, presented to the emergency department with chest pain and lightheadedness. EKG showed sinus bradycardia. TSH 0.26. T4 0.9. cardiology was consulted for symptomatic bradycardia. EP was consulted and patient was started on theophylline and florinef. After discussion with cardiology both were discontinued. ACTH 21. He was started on synthroid 50 mcg and Hydrocort 50 mg for hypopituitarism. He was closely monitored. He is medically stable and is being discharged with the following instructions    Procedures/ Significant Interventions:    none    Consults:     Consults:     Final Specialist Recommendations/Findings:   IP CONSULT TO CARDIOLOGY  IP CONSULT TO INTERNAL MEDICINE  IP CONSULT TO CASE MANAGEMENT      Any Hospital Acquired Infections: none    Discharge Functional Status:  stable    DISCHARGE PLAN     Disposition: home    Patient Instructions:   Current Discharge Medication List        START taking these medications    Details   levothyroxine (SYNTHROID) 50 MCG tablet Take 1 tablet by mouth Daily  Qty: 30 tablet, Refills: 4      hydrocortisone (CORTEF) 10 MG tablet Take  grisel, FU with  about the result  Follow up labs: none  Follow up imaging: none    Note that over 30 minutes was spent in preparing discharge papers, discussing discharge with patient, medication review, etc.      Claudia Enrique MD, MD  Internal Medicine Resident, PGY-1  [facility]; [place], [place]  6/1/2024, 7:20 PM

## 2024-05-31 NOTE — PROGRESS NOTES
scleral icterus.  Cardiovascular:      Rate and Rhythm: Regular rhythm. Bradycardia present.      Heart sounds: Normal heart sounds.   Pulmonary:      Effort: Pulmonary effort is normal. No respiratory distress.   Abdominal:      General: There is no distension.      Palpations: Abdomen is soft.      Tenderness: There is no abdominal tenderness.   Musculoskeletal:      Right lower leg: No edema.      Left lower leg: No edema.   Skin:     General: Skin is warm.      Coloration: Skin is not jaundiced.   Neurological:      Mental Status: He is alert and oriented to person, place, and time.      Cranial Nerves: No cranial nerve deficit.      Motor: No weakness.   Psychiatric:         Mood and Affect: Mood normal.         Behavior: Behavior normal.       Medications:  Scheduled Medications:    levothyroxine  50 mcg Oral Daily    hydrocortisone  50 mg Oral Daily    famotidine  20 mg Oral BID    sodium chloride flush  5-40 mL IntraVENous 2 times per day    sodium chloride flush  5-40 mL IntraVENous 2 times per day    enoxaparin  40 mg SubCUTAneous Daily    nicotine  1 patch TransDERmal Daily     Continuous Infusions:    sodium chloride       PRN Medicationssodium chloride flush, 10 mL, PRN  sodium chloride flush, 5-40 mL, PRN  sodium chloride flush, 5-40 mL, PRN  sodium chloride, , PRN  potassium chloride, 40 mEq, PRN   Or  potassium alternative oral replacement, 40 mEq, PRN   Or  potassium chloride, 10 mEq, PRN  magnesium sulfate, 2,000 mg, PRN  ondansetron, 4 mg, Q8H PRN   Or  ondansetron, 4 mg, Q6H PRN  polyethylene glycol, 17 g, Daily PRN  acetaminophen, 650 mg, Q6H PRN   Or  acetaminophen, 650 mg, Q6H PRN        Diagnostic Labs:  CBC:   Recent Labs     05/29/24  0737 05/30/24  0213 05/31/24  0636   WBC 6.5 6.9 7.6   RBC 4.01* 4.18* 4.57   HGB 11.8* 12.4* 13.6   HCT 37.7* 38.8* 42.6   MCV 94.0 92.8 93.2   RDW 12.6 12.4 12.3    183 178       BMP:   Recent Labs     05/28/24  2142 05/29/24  0737 05/30/24 0213

## 2024-05-31 NOTE — DISCHARGE INSTR - DIET

## 2024-05-31 NOTE — CARE COORDINATION
Discharge Report    [facility]  Clinical Case Management Department  Written by: Graciela Senior RN    Patient Name: Meek Shi  Attending Provider: Kellie Wyman MD  Admit Date: 2024 11:45 AM  MRN: [id number]  Account: [id number]                     : 1980  Discharge Date: 2024      Disposition: home    Graciela Senior RN

## 2024-05-31 NOTE — DISCHARGE INSTRUCTIONS
You were admitted to the hospital with low heart rate and found to have low thyroid hormone.   You were started on new medications.  Start taking levothyroxine 50 mcg daily.  Start taking Hydrocort 50 mg daily in the morning.  Continue taking old medications as prescribed.  Follow up with cardiology in one week on 6/5 with the Holter monitor.  Follow up with PCP in one week.  Please schedule an appointment with endocrinologist as soon as possible.  In case of worsening symptoms or new symptoms arise, please visit ER.  You will be given a holter monitor on discharge for 7 days, FU with  about the result

## 2024-05-31 NOTE — PROGRESS NOTES
[facility]   Progress Note                   Date:   5/31/2024  Patient name: Meek Shi  Date of admission:  5/28/2024 11:45 AM  MRN:   [id number]  YOB: 1980  PCP: Macey Mcpherson DO    Reason for Admission: Bradycardia [R00.1]  Symptomatic bradycardia [R00.1]  Chest pain, unspecified type [R07.9]    Subjective:       Clinical Changes / Abnormalities: Pt seen and examined in the room. No acute CV issues/concerns overnight. Labs, vitals, & tele reviewed. Tele remains SB 50s.     Medications:   Scheduled Meds:   levothyroxine  50 mcg Oral Daily    hydrocortisone  50 mg Oral Daily    theophylline  100 mg Oral 3 times per day    fludrocortisone  0.1 mg Oral Daily    famotidine  20 mg Oral BID    sodium chloride flush  5-40 mL IntraVENous 2 times per day    sodium chloride flush  5-40 mL IntraVENous 2 times per day    enoxaparin  40 mg SubCUTAneous Daily    nicotine  1 patch TransDERmal Daily     Continuous Infusions:   sodium chloride       CBC:   Recent Labs     05/29/24  0737 05/30/24  0213 05/31/24  0636   WBC 6.5 6.9 7.6   HGB 11.8* 12.4* 13.6    183 178       BMP:    Recent Labs     05/29/24  0737 05/30/24  0213 05/31/24  0636    139 137   K 4.2 3.7 4.3   * 105 104   CO2 25 24 25   BUN 12 11 15   CREATININE 0.7 0.7 0.8   GLUCOSE 98 105* 97       Hepatic:   Recent Labs     05/29/24  0737   AST 22   ALT <5*   BILITOT 0.5   ALKPHOS 59       Troponin:   Recent Labs     05/28/24  1315 05/28/24  1821 05/28/24  1942   TROPHS <6 <6 <6       BNP: No results for input(s): \"BNP\" in the last 72 hours.  Lipids: No results for input(s): \"CHOL\", \"HDL\" in the last 72 hours.    Invalid input(s): \"LDLCALCU\"  INR:   Recent Labs     05/28/24  1821   INR 1.0         Objective:   Vitals: /60   Pulse (!) 48   Temp 97.8 °F (36.6 °C) (Oral)   Resp 17   Ht 1.88 m (6' 2\")   Wt 84.3 kg (185 lb 13.6 oz)   SpO2 95%   BMI 23.86 kg/m²   General appearance: alert and

## 2024-06-01 LAB — IGF BINDING PROTEIN-3: 4560 NG/ML (ref 2360–5560)

## 2024-06-03 ENCOUNTER — OFFICE VISIT (OUTPATIENT)
Dept: FAMILY MEDICINE CLINIC | Age: 44
End: 2024-06-03
Payer: COMMERCIAL

## 2024-06-03 VITALS
SYSTOLIC BLOOD PRESSURE: 103 MMHG | BODY MASS INDEX: 23.87 KG/M2 | WEIGHT: 186 LBS | HEIGHT: 74 IN | HEART RATE: 53 BPM | OXYGEN SATURATION: 99 % | DIASTOLIC BLOOD PRESSURE: 62 MMHG

## 2024-06-03 DIAGNOSIS — I49.8 OTHER CARDIAC ARRHYTHMIA: Primary | ICD-10-CM

## 2024-06-03 PROCEDURE — 99213 OFFICE O/P EST LOW 20 MIN: CPT | Performed by: FAMILY MEDICINE

## 2024-06-03 ASSESSMENT — PATIENT HEALTH QUESTIONNAIRE - PHQ9
2. FEELING DOWN, DEPRESSED OR HOPELESS: NOT AT ALL
1. LITTLE INTEREST OR PLEASURE IN DOING THINGS: NOT AT ALL
SUM OF ALL RESPONSES TO PHQ9 QUESTIONS 1 & 2: 0
SUM OF ALL RESPONSES TO PHQ QUESTIONS 1-9: 0

## 2024-06-03 NOTE — PROGRESS NOTES
No thyromegaly present.   Cardiovascular: Normal rate, irregular rhythm and normal heart sounds.    No murmur heard.  Pulmonary/Chest: Effort normal and breath sounds normal. He has no wheezes. Hehas no rales.   Abdominal: Soft. Bowel sounds are normal. He exhibits no distension and no mass.  There is no tenderness. There is no rebound and no guarding.   Genitourinary/Anorectal:deferred  Musculoskeletal: Normal range of motion. He exhibits no edema or tenderness.   Lymphadenopathy: He has no cervical adenopathy.   Neurological: He is alert and oriented to person, place, and time. He has normal reflexes.   Skin: Skin is warm and dry. No rash noted.   Psychiatric: He has a normal mood and affect. His   behavior is normal.       Assessment:      1. Other cardiac arrhythmia          Plan:      Call or return to clinic prn if these symptoms worsen or fail to improve as anticipated.  I have reviewed the instructions with the patient, answering all questions to his satisfaction.    Return if symptoms worsen or fail to improve.  No orders of the defined types were placed in this encounter.    No orders of the defined types were placed in this encounter.  To I discussed with him he should stop the medications gave given to him at the emergency room  He probably is in A-fib which on auscultation it sounds like he is is not actually bradycardic  The amount of energy drinks he has been consuming is caused him to get into this arrhythmia  And he needs to continue wearing the Holter monitor and follow-up with cardiology  Electronically signed by Macey Mcpherson DO on 6/3/2024 at 9:26 AM

## 2024-06-03 NOTE — PROGRESS NOTES
Patient called with questions d/t hospital follow up with his PCP. Page sent to Dr. Wyman, the attending provider for his hospital say. Patient wanted to know if he should continue to take the prescribed medications. Call placed to patent letting him know that Dr. Wyman was contacted. Writer confirmed that patient has a follow up appointment with Dr. Noriega at [facility] who may also be able to address some of his concerns. Writer told patient to call if he has any other questions.   Electronically signed by RACHEL ADAMS RN on 6/3/2024 at 10:41 AM

## 2024-06-07 ENCOUNTER — HOSPITAL ENCOUNTER (OUTPATIENT)
Dept: NUCLEAR MEDICINE | Age: 44
Discharge: HOME | End: 2024-06-09
Payer: COMMERCIAL

## 2024-06-07 ENCOUNTER — HOSPITAL ENCOUNTER (OUTPATIENT)
Age: 44
End: 2024-06-07
Payer: COMMERCIAL

## 2024-06-07 DIAGNOSIS — I20.9 ANGINA PECTORIS (HCC): ICD-10-CM

## 2024-06-07 PROCEDURE — A9500 TC99M SESTAMIBI: HCPCS

## 2024-06-07 PROCEDURE — 2580000003 HC RX 258

## 2024-06-07 PROCEDURE — 93017 CV STRESS TEST TRACING ONLY: CPT

## 2024-06-07 PROCEDURE — 3430000000 HC RX DIAGNOSTIC RADIOPHARMACEUTICAL

## 2024-06-07 PROCEDURE — 93018 CV STRESS TEST I&R ONLY: CPT | Performed by: INTERNAL MEDICINE

## 2024-06-07 PROCEDURE — 78452 HT MUSCLE IMAGE SPECT MULT: CPT

## 2024-06-07 PROCEDURE — 6360000002 HC RX W HCPCS

## 2024-06-07 RX ORDER — NITROGLYCERIN 0.4 MG/1
0.4 TABLET SUBLINGUAL EVERY 5 MIN PRN
Status: ACTIVE | OUTPATIENT
Start: 2024-06-07 | End: 2024-06-07

## 2024-06-07 RX ORDER — REGADENOSON 0.08 MG/ML
0.4 INJECTION, SOLUTION INTRAVENOUS
Status: COMPLETED | OUTPATIENT
Start: 2024-06-07 | End: 2024-06-07

## 2024-06-07 RX ORDER — TETRAKIS(2-METHOXYISOBUTYLISOCYANIDE)COPPER(I) TETRAFLUOROBORATE 1 MG/ML
13.9 INJECTION, POWDER, LYOPHILIZED, FOR SOLUTION INTRAVENOUS
Status: COMPLETED | OUTPATIENT
Start: 2024-06-07 | End: 2024-06-07

## 2024-06-07 RX ORDER — SODIUM CHLORIDE 0.9 % (FLUSH) 0.9 %
5-40 SYRINGE (ML) INJECTION PRN
Status: ACTIVE | OUTPATIENT
Start: 2024-06-07 | End: 2024-06-07

## 2024-06-07 RX ORDER — SODIUM CHLORIDE 0.9 % (FLUSH) 0.9 %
10 SYRINGE (ML) INJECTION PRN
Status: DISCONTINUED | OUTPATIENT
Start: 2024-06-07 | End: 2024-06-10 | Stop reason: HOSPADM

## 2024-06-07 RX ORDER — ATROPINE SULFATE 0.1 MG/ML
0.5 INJECTION INTRAVENOUS EVERY 5 MIN PRN
Status: ACTIVE | OUTPATIENT
Start: 2024-06-07 | End: 2024-06-07

## 2024-06-07 RX ORDER — TETRAKIS(2-METHOXYISOBUTYLISOCYANIDE)COPPER(I) TETRAFLUOROBORATE 1 MG/ML
37 INJECTION, POWDER, LYOPHILIZED, FOR SOLUTION INTRAVENOUS
Status: COMPLETED | OUTPATIENT
Start: 2024-06-07 | End: 2024-06-07

## 2024-06-07 RX ORDER — AMINOPHYLLINE 25 MG/ML
50 INJECTION, SOLUTION INTRAVENOUS PRN
Status: ACTIVE | OUTPATIENT
Start: 2024-06-07 | End: 2024-06-07

## 2024-06-07 RX ORDER — SODIUM CHLORIDE 9 MG/ML
500 INJECTION, SOLUTION INTRAVENOUS CONTINUOUS PRN
Status: ACTIVE | OUTPATIENT
Start: 2024-06-07 | End: 2024-06-07

## 2024-06-07 RX ORDER — ALBUTEROL SULFATE 90 UG/1
2 AEROSOL, METERED RESPIRATORY (INHALATION) PRN
Status: ACTIVE | OUTPATIENT
Start: 2024-06-07 | End: 2024-06-07

## 2024-06-07 RX ORDER — METOPROLOL TARTRATE 1 MG/ML
5 INJECTION, SOLUTION INTRAVENOUS EVERY 5 MIN PRN
Status: ACTIVE | OUTPATIENT
Start: 2024-06-07 | End: 2024-06-07

## 2024-06-07 RX ADMIN — REGADENOSON 0.4 MG: 0.08 INJECTION, SOLUTION INTRAVENOUS at 10:34

## 2024-06-07 RX ADMIN — SODIUM CHLORIDE, PRESERVATIVE FREE 10 ML: 5 INJECTION INTRAVENOUS at 08:55

## 2024-06-07 RX ADMIN — SODIUM CHLORIDE, PRESERVATIVE FREE 10 ML: 5 INJECTION INTRAVENOUS at 10:45

## 2024-06-07 RX ADMIN — TETRAKIS(2-METHOXYISOBUTYLISOCYANIDE)COPPER(I) TETRAFLUOROBORATE 13.9 MILLICURIE: 1 INJECTION, POWDER, LYOPHILIZED, FOR SOLUTION INTRAVENOUS at 08:55

## 2024-06-07 RX ADMIN — TETRAKIS(2-METHOXYISOBUTYLISOCYANIDE)COPPER(I) TETRAFLUOROBORATE 37 MILLICURIE: 1 INJECTION, POWDER, LYOPHILIZED, FOR SOLUTION INTRAVENOUS at 10:35

## 2024-06-07 RX ADMIN — SODIUM CHLORIDE, PRESERVATIVE FREE 10 ML: 5 INJECTION INTRAVENOUS at 10:11

## 2024-06-08 LAB
NUC STRESS EJECTION FRACTION: 39 %
STRESS BASELINE DIAS BP: 72 MMHG
STRESS BASELINE HR: 40 BPM
STRESS BASELINE SYS BP: 110 MMHG
STRESS ESTIMATED WORKLOAD: 1 METS
STRESS PEAK DIAS BP: 72 MMHG
STRESS PEAK SYS BP: 110 MMHG
STRESS PERCENT HR ACHIEVED: 49 %
STRESS POST PEAK HR: 86 BPM
STRESS RATE PRESSURE PRODUCT: 9460 BPM*MMHG
STRESS ST DEPRESSION: 0 MM
STRESS TARGET HR: 177 BPM
TID: 1.02

## 2024-06-19 ENCOUNTER — HOSPITAL ENCOUNTER (EMERGENCY)
Age: 44
Discharge: HOME | End: 2024-06-19
Attending: EMERGENCY MEDICINE
Payer: COMMERCIAL

## 2024-06-19 ENCOUNTER — APPOINTMENT (OUTPATIENT)
Dept: GENERAL RADIOLOGY | Age: 44
End: 2024-06-19
Payer: COMMERCIAL

## 2024-06-19 VITALS
OXYGEN SATURATION: 98 % | TEMPERATURE: 98.1 F | SYSTOLIC BLOOD PRESSURE: 114 MMHG | BODY MASS INDEX: 23.75 KG/M2 | HEART RATE: 60 BPM | DIASTOLIC BLOOD PRESSURE: 71 MMHG | RESPIRATION RATE: 20 BRPM | WEIGHT: 185 LBS

## 2024-06-19 DIAGNOSIS — S52.125A CLOSED NONDISPLACED FRACTURE OF HEAD OF LEFT RADIUS, INITIAL ENCOUNTER: Primary | ICD-10-CM

## 2024-06-19 PROCEDURE — 6370000000 HC RX 637 (ALT 250 FOR IP): Performed by: STUDENT IN AN ORGANIZED HEALTH CARE EDUCATION/TRAINING PROGRAM

## 2024-06-19 PROCEDURE — 73110 X-RAY EXAM OF WRIST: CPT

## 2024-06-19 PROCEDURE — 73080 X-RAY EXAM OF ELBOW: CPT

## 2024-06-19 PROCEDURE — 99284 EMERGENCY DEPT VISIT MOD MDM: CPT

## 2024-06-19 PROCEDURE — 73090 X-RAY EXAM OF FOREARM: CPT

## 2024-06-19 PROCEDURE — 73030 X-RAY EXAM OF SHOULDER: CPT

## 2024-06-19 PROCEDURE — 96372 THER/PROPH/DIAG INJ SC/IM: CPT

## 2024-06-19 PROCEDURE — 29105 APPLICATION LONG ARM SPLINT: CPT

## 2024-06-19 PROCEDURE — 6360000002 HC RX W HCPCS: Performed by: STUDENT IN AN ORGANIZED HEALTH CARE EDUCATION/TRAINING PROGRAM

## 2024-06-19 PROCEDURE — 73120 X-RAY EXAM OF HAND: CPT

## 2024-06-19 RX ORDER — OXYCODONE HYDROCHLORIDE AND ACETAMINOPHEN 5; 325 MG/1; MG/1
1 TABLET ORAL EVERY 4 HOURS PRN
Qty: 12 TABLET | Refills: 0 | Status: SHIPPED | OUTPATIENT
Start: 2024-06-19 | End: 2024-06-22

## 2024-06-19 RX ORDER — CYCLOBENZAPRINE HCL 10 MG
10 TABLET ORAL ONCE
Status: COMPLETED | OUTPATIENT
Start: 2024-06-19 | End: 2024-06-19

## 2024-06-19 RX ORDER — KETOROLAC TROMETHAMINE 15 MG/ML
15 INJECTION, SOLUTION INTRAMUSCULAR; INTRAVENOUS ONCE
Status: COMPLETED | OUTPATIENT
Start: 2024-06-19 | End: 2024-06-19

## 2024-06-19 RX ADMIN — CYCLOBENZAPRINE 10 MG: 10 TABLET, FILM COATED ORAL at 19:27

## 2024-06-19 RX ADMIN — KETOROLAC TROMETHAMINE 15 MG: 15 INJECTION, SOLUTION INTRAMUSCULAR; INTRAVENOUS at 19:26

## 2024-06-19 ASSESSMENT — ENCOUNTER SYMPTOMS
NAUSEA: 0
WHEEZING: 0
BACK PAIN: 0
ABDOMINAL PAIN: 0
SORE THROAT: 0
VOMITING: 0
SHORTNESS OF BREATH: 0
COUGH: 0
DIARRHEA: 0

## 2024-06-19 ASSESSMENT — PAIN SCALES - GENERAL: PAINLEVEL_OUTOF10: 10

## 2024-06-19 ASSESSMENT — PAIN - FUNCTIONAL ASSESSMENT: PAIN_FUNCTIONAL_ASSESSMENT: 0-10

## 2024-06-19 ASSESSMENT — PAIN DESCRIPTION - ORIENTATION: ORIENTATION: LEFT

## 2024-06-19 ASSESSMENT — PAIN DESCRIPTION - LOCATION: LOCATION: ELBOW;HAND

## 2024-06-19 NOTE — ED PROVIDER NOTES
[facility]     Emergency Department     Faculty Attestation    I performed a history and physical examination of the patient and discussed management with the resident. I have reviewed and agree with the resident’s findings including all diagnostic interpretations, and treatment plans as written at the time of my review. Any areas of disagreement are noted on the chart. I was personally present for the key portions of any procedures. I have documented in the chart those procedures where I was not present during the key portions. For Physician Assistant/ Nurse Practitioner cases/documentation I have personally evaluated this patient and have completed at least one if not all key elements of the E/M (history, physical exam, and MDM). Additional findings are as noted.    PtName: Meek Shi  MRN: [id number]  Birthdate 1980  Date of evaluation: 6/19/24  Note Started: 7:23 PM EDT    Primary Care Physician: Macey Mcpherson DO        History: This is a 44 y.o. male who presents to the Emergency Department with complaint of left wrist and arm and elbow pain after a fall earlier today.  He denied hitting his head.  There is no reported loss conscious.  Denies any neck back or chest pain.    Physical:   weight is 83.9 kg (185 lb). His oral temperature is 98.1 °F (36.7 °C). His blood pressure is 114/71 and his pulse is 60. His respiration is 20 and oxygen saturation is 98%.  Tenderness to palpation along the left elbow forearm and wrist.  He has decreased range of motion at the wrist of the elbow secondary to pain.  He has no tenderness over the shoulder.  Radial pulses 2/4.  Sensation light touch intact distally.    Impression: Left upper extremity pain, status post fall    Plan: X-ray, analgesia      Medical Decision Making  Problems Addressed:  Closed nondisplaced fracture of head of left radius, initial encounter: acute illness or injury    Amount and/or  Complexity of Data Reviewed  Radiology: ordered.  Discussion of management or test interpretation with external provider(s): Orthopedic surgery    Risk  Prescription drug management.  Risk Details: Long-arm splint            (Please note that portions of this note were completed with a voice recognition program.  Efforts were made to edit the dictations but occasionally words are mis-transcribed.)    Paramjit Choudhury MD, FACEP  Attending Emergency Medicine Physician        Paramjit Choudhury MD  06/19/24 6550

## 2024-06-19 NOTE — ED TRIAGE NOTES
Pt presents to ED via triage with complaint of Left elbow pain and wrist injury.  Pt states a couple hours ago while he was moving stuff from his car to his trailer he tripped and fell landing on his left elbow.  Pt is holding his left arm unable to lift  but can wiggle his fingers and has full sensation.   Pt denies hitting head and denies LOC.  Pt is alert and oriented x 4.  Vital signs within normal.

## 2024-06-20 NOTE — ED PROVIDER NOTES
[facility] ED  Emergency Department Encounter  Emergency Medicine Resident     Pt Name:Meek Shi  MRN: [id number]  Birthdate 1980  Date of evaluation: 6/19/24  PCP:  Macey Mcpherson DO  Note Started: 10:00 PM EDT      CHIEF COMPLAINT       Chief Complaint   Patient presents with    Elbow Pain    Wrist Injury     Tripped and fell, did not hit head, No LOC       HISTORY OF PRESENT ILLNESS  (Location/Symptom, Timing/Onset, Context/Setting, Quality, Duration, Modifying Factors, Severity.)      Meek Shi is a 44 y.o. male who presents with left elbow pain.  Patient states he was walking when he tripped and fell landing on an outstretched left hand.  States he has been having pain in the wrist and elbow since.  Denies any numbness or tingling in the hand or fingers.  Did not hit his head or lose consciousness.  Patient not on any blood thinning medications.  No other complaints at this time.    PAST MEDICAL / SURGICAL / SOCIAL / FAMILY HISTORY      has a past medical history of Abscess, mastoid, subperiosteal, right, ADHD, Asthma, Bipolar disorder (HCC), Bradycardia, COVID-19, Lumbar sprain, Major depression, Metal foreign body in eye region, Orbital cellulitis on right, Poor dentition, Spontaneous pneumothorax, Under care of team, Under care of team, Under care of team, and Under care of team.       has a past surgical history that includes Hand surgery (Right); Maxillary antrostomy (Right, 10/03/2023); sinusotomy (10/03/2023); sinus surgery (01/24/2024); and Sinus endoscopy (N/A, 1/24/2024).      Social History     Socioeconomic History    Marital status: Single     Spouse name: Not on file    Number of children: Not on file    Years of education: Not on file    Highest education level: Not on file   Occupational History    Not on file   Tobacco Use    Smoking status: Every Day     Current packs/day: 1.00     Average packs/day: 1 pack/day for 44.5 years (44.5 ttl pk-yrs)

## 2024-06-20 NOTE — DISCHARGE INSTRUCTIONS
You were seen in the ED for a fall. Found to have a fracture of the radial head, which is part of the bone near the elbow. You have been placed in a splint. Given a sling for comfort. Please call the orthopaedic clinic for follow up. Please return to the emergency department for any new or worsening symptoms.

## 2024-06-21 ENCOUNTER — OFFICE VISIT (OUTPATIENT)
Dept: ORTHOPEDIC SURGERY | Age: 44
End: 2024-06-21
Payer: COMMERCIAL

## 2024-06-21 VITALS — BODY MASS INDEX: 23.74 KG/M2 | WEIGHT: 185 LBS | HEIGHT: 74 IN | RESPIRATION RATE: 14 BRPM

## 2024-06-21 DIAGNOSIS — S52.125A CLOSED NONDISPLACED FRACTURE OF HEAD OF LEFT RADIUS, INITIAL ENCOUNTER: Primary | ICD-10-CM

## 2024-06-21 PROCEDURE — 99203 OFFICE O/P NEW LOW 30 MIN: CPT

## 2024-06-28 ENCOUNTER — OFFICE VISIT (OUTPATIENT)
Dept: ORTHOPEDIC SURGERY | Age: 44
End: 2024-06-28
Payer: COMMERCIAL

## 2024-06-28 VITALS — BODY MASS INDEX: 23.74 KG/M2 | RESPIRATION RATE: 14 BRPM | WEIGHT: 185 LBS | HEIGHT: 74 IN

## 2024-06-28 DIAGNOSIS — S52.125A CLOSED NONDISPLACED FRACTURE OF HEAD OF LEFT RADIUS, INITIAL ENCOUNTER: Primary | ICD-10-CM

## 2024-06-28 PROCEDURE — 99212 OFFICE O/P EST SF 10 MIN: CPT

## 2024-06-28 NOTE — PROGRESS NOTES
HPI: Mr. Shi is a 44-year-old male who presents today for a 1 week follow-up regarding his left nondisplaced radial head fracture.  At his last visit I did make the decision to keep him in the posterior long-arm splint as he was complaining of pain and it was well-fitted and he had no complaints is reasonable to keep him in this for another week.  He presents today stating no pain in the elbow.  He was removed from his posterior long-arm splint x-rays.  He was adherent to the sling.  States that he bent taking Percocets for other reasons and was taking those intermittently as well as ibuprofen intermittently for any pain.  States that his pain is much improved.  Has been adherent to immobilization and has not been using the arm for much activity.    Physical exam: Evaluation of the patient's left elbow and upper extremity demonstrates skin to be clean, dry and intact.  Mild swelling noted over the level of the posterior elbow.  2+ radial pulse with brisk capillary refill.  Sensation is grossly intact to light touch in all dermatomes.  Does have mild to moderate tenderness over the level of the radial head with pronating and supinating movements.  No ecchymosis, erythema or warmth.  Patient does have near full passive range of motion without any mechanical catching or locking.  He does have pain with maximal extension and maximal flexion.  Pain with active pronation/supination.    Imaging: Three-view x-rays of the left elbow completed on 6/28/2024 were reviewed independently demonstrating nondisplaced, vertical fracture through the radial head as well as a nondisplaced avulsion fracture at the tip of the coronoid.  No change in alignment at either of these fracture sites from previous radiographs.    Impression/plan: Mr. Shi is a 44-year-old male who presents today for a 1 week follow-up regarding his left nondisplaced radial head fracture, avulsion fracture at the tip of the coronoid.  We did again discuss

## 2024-07-12 ENCOUNTER — OFFICE VISIT (OUTPATIENT)
Dept: ORTHOPEDIC SURGERY | Age: 44
End: 2024-07-12
Payer: COMMERCIAL

## 2024-07-12 DIAGNOSIS — M25.522 LEFT ELBOW PAIN: ICD-10-CM

## 2024-07-12 DIAGNOSIS — S52.125D CLOSED NONDISPLACED FRACTURE OF HEAD OF LEFT RADIUS WITH ROUTINE HEALING, SUBSEQUENT ENCOUNTER: Primary | ICD-10-CM

## 2024-07-12 PROCEDURE — 99212 OFFICE O/P EST SF 10 MIN: CPT

## 2024-07-13 NOTE — PROGRESS NOTES
continue to restrict how much weight he is lifting to about no more than 5 pounds at this point.  I will see the patient back in 4 weeks for new radiographs and evaluation.  Patient is amenable to plan at this time and was encouraged to contact the office any question concerns he may have prior to his next follow-up appointment.

## 2024-08-07 ENCOUNTER — OFFICE VISIT (OUTPATIENT)
Dept: INFECTIOUS DISEASES | Age: 44
End: 2024-08-07
Payer: COMMERCIAL

## 2024-08-07 VITALS
TEMPERATURE: 98.1 F | OXYGEN SATURATION: 96 % | BODY MASS INDEX: 23.49 KG/M2 | HEIGHT: 74 IN | WEIGHT: 183 LBS | DIASTOLIC BLOOD PRESSURE: 71 MMHG | RESPIRATION RATE: 19 BRPM | HEART RATE: 90 BPM | SYSTOLIC BLOOD PRESSURE: 106 MMHG

## 2024-08-07 DIAGNOSIS — J32.1 CHRONIC FRONTAL SINUSITIS: Primary | ICD-10-CM

## 2024-08-07 PROCEDURE — 99214 OFFICE O/P EST MOD 30 MIN: CPT | Performed by: INTERNAL MEDICINE

## 2024-08-07 ASSESSMENT — ENCOUNTER SYMPTOMS
RESPIRATORY NEGATIVE: 1
SINUS PAIN: 1
GASTROINTESTINAL NEGATIVE: 1

## 2024-08-07 NOTE — PROGRESS NOTES
[facility]  Office Progress Note  Today's Date and Time: 8/7/2024, 11:53 AM    Impression:     1. Chronic frontal sinusitis         Recommendations   The patient is seen and currently is doing okay he does still report some drainage from the sinuses especially in the mornings.  The concern at this point in time is whether the patient can have a more effective antibiotic regimen and we discussed intravenous antibiotics if his infection were to return and the patient was not really interested in doing this and reported \"my body did not respond well to that\"  We also discussed that the other potential would be oral linezolid as long as he continues to grow Staphylococcus aureus as this has good bioavailability  And will follow his clinical progress and adjust therapy accordingly and the plan for now is for him to follow-up with me in 4 to 6 weeks    I have ordered the following medications/ labs:  No orders of the defined types were placed in this encounter.     No orders of the defined types were placed in this encounter.      Chief complaint/reason for consultation:     Chief Complaint   Patient presents with    Chronic frontal sinusitis    Orbital abscess        History of Present Illness:   Meek Shi is a 44 y.o.-year-old male who I am seeing in follow-up and has persistent right frontal sinus infection and has extensive bone growth.  The patient was taken to the operating room was found to have complete obstruction of the right frontal recess with new bone growth.  Right frontal sinus with purulence and inflamed mucosa.  This was widely opened across the midline to create a common frontal sinus outflow tract on 6/25/2024    The patient was discharged on Bactrim and he reports that he has had large chunks of green mucosa especially in the mornings as he feels that he has secretions build up at night.  He does not report any subjective fevers, chills or sweats.  He reports that the sinus

## 2024-08-09 ENCOUNTER — OFFICE VISIT (OUTPATIENT)
Dept: ORTHOPEDIC SURGERY | Age: 44
End: 2024-08-09
Payer: COMMERCIAL

## 2024-08-09 DIAGNOSIS — S52.125D CLOSED NONDISPLACED FRACTURE OF HEAD OF LEFT RADIUS WITH ROUTINE HEALING, SUBSEQUENT ENCOUNTER: Primary | ICD-10-CM

## 2024-08-09 PROCEDURE — 99213 OFFICE O/P EST LOW 20 MIN: CPT

## 2024-09-06 ENCOUNTER — OFFICE VISIT (OUTPATIENT)
Dept: ORTHOPEDIC SURGERY | Age: 44
End: 2024-09-06
Payer: COMMERCIAL

## 2024-09-06 VITALS — WEIGHT: 180 LBS | RESPIRATION RATE: 14 BRPM | HEIGHT: 74 IN | BODY MASS INDEX: 23.1 KG/M2

## 2024-09-06 DIAGNOSIS — S52.125D CLOSED NONDISPLACED FRACTURE OF HEAD OF LEFT RADIUS WITH ROUTINE HEALING, SUBSEQUENT ENCOUNTER: Primary | ICD-10-CM

## 2024-09-06 PROCEDURE — 99213 OFFICE O/P EST LOW 20 MIN: CPT

## 2024-09-18 ENCOUNTER — OFFICE VISIT (OUTPATIENT)
Dept: INFECTIOUS DISEASES | Age: 44
End: 2024-09-18
Payer: COMMERCIAL

## 2024-09-18 VITALS
DIASTOLIC BLOOD PRESSURE: 61 MMHG | WEIGHT: 192 LBS | HEART RATE: 55 BPM | TEMPERATURE: 97 F | SYSTOLIC BLOOD PRESSURE: 108 MMHG | BODY MASS INDEX: 24.64 KG/M2 | HEIGHT: 74 IN

## 2024-09-18 DIAGNOSIS — J32.0 CHRONIC MAXILLARY SINUSITIS: ICD-10-CM

## 2024-09-18 DIAGNOSIS — J32.1 CHRONIC FRONTAL SINUSITIS: Primary | ICD-10-CM

## 2024-09-18 PROCEDURE — 99213 OFFICE O/P EST LOW 20 MIN: CPT | Performed by: INTERNAL MEDICINE

## 2024-10-08 ENCOUNTER — APPOINTMENT (OUTPATIENT)
Dept: GENERAL RADIOLOGY | Age: 44
End: 2024-10-08
Payer: COMMERCIAL

## 2024-10-08 ENCOUNTER — HOSPITAL ENCOUNTER (EMERGENCY)
Age: 44
Discharge: HOME | End: 2024-10-08
Attending: EMERGENCY MEDICINE
Payer: COMMERCIAL

## 2024-10-08 VITALS
OXYGEN SATURATION: 95 % | WEIGHT: 185 LBS | TEMPERATURE: 97.9 F | RESPIRATION RATE: 13 BRPM | DIASTOLIC BLOOD PRESSURE: 67 MMHG | BODY MASS INDEX: 23.74 KG/M2 | SYSTOLIC BLOOD PRESSURE: 103 MMHG | HEART RATE: 58 BPM | HEIGHT: 74 IN

## 2024-10-08 DIAGNOSIS — S62.642A CLOSED NONDISPLACED FRACTURE OF PROXIMAL PHALANX OF RIGHT MIDDLE FINGER, INITIAL ENCOUNTER: ICD-10-CM

## 2024-10-08 DIAGNOSIS — S62.640A CLOSED NONDISPLACED FRACTURE OF PROXIMAL PHALANX OF RIGHT INDEX FINGER, INITIAL ENCOUNTER: Primary | ICD-10-CM

## 2024-10-08 DIAGNOSIS — S61.411A LACERATION OF RIGHT HAND WITHOUT FOREIGN BODY, INITIAL ENCOUNTER: ICD-10-CM

## 2024-10-08 LAB — GLUCOSE BLD-MCNC: 97 MG/DL (ref 75–110)

## 2024-10-08 PROCEDURE — 90471 IMMUNIZATION ADMIN: CPT

## 2024-10-08 PROCEDURE — 82947 ASSAY GLUCOSE BLOOD QUANT: CPT

## 2024-10-08 PROCEDURE — 93005 ELECTROCARDIOGRAM TRACING: CPT | Performed by: EMERGENCY MEDICINE

## 2024-10-08 PROCEDURE — 99284 EMERGENCY DEPT VISIT MOD MDM: CPT

## 2024-10-08 PROCEDURE — 6360000002 HC RX W HCPCS

## 2024-10-08 PROCEDURE — 2500000003 HC RX 250 WO HCPCS

## 2024-10-08 PROCEDURE — 90715 TDAP VACCINE 7 YRS/> IM: CPT

## 2024-10-08 PROCEDURE — 6370000000 HC RX 637 (ALT 250 FOR IP)

## 2024-10-08 PROCEDURE — 73130 X-RAY EXAM OF HAND: CPT

## 2024-10-08 RX ORDER — CEPHALEXIN 500 MG/1
500 CAPSULE ORAL 2 TIMES DAILY
Qty: 14 CAPSULE | Refills: 0 | Status: SHIPPED | OUTPATIENT
Start: 2024-10-08 | End: 2024-10-15

## 2024-10-08 RX ORDER — ACETAMINOPHEN 500 MG
500 TABLET ORAL 4 TIMES DAILY PRN
Qty: 120 TABLET | Refills: 0 | Status: SHIPPED | OUTPATIENT
Start: 2024-10-08

## 2024-10-08 RX ORDER — IBUPROFEN 400 MG/1
400 TABLET, FILM COATED ORAL ONCE
Status: COMPLETED | OUTPATIENT
Start: 2024-10-08 | End: 2024-10-08

## 2024-10-08 RX ORDER — CEPHALEXIN 500 MG/1
500 CAPSULE ORAL ONCE
Status: COMPLETED | OUTPATIENT
Start: 2024-10-08 | End: 2024-10-08

## 2024-10-08 RX ORDER — ACETAMINOPHEN 325 MG/1
650 TABLET ORAL ONCE
Status: COMPLETED | OUTPATIENT
Start: 2024-10-08 | End: 2024-10-08

## 2024-10-08 RX ORDER — LIDOCAINE HYDROCHLORIDE 10 MG/ML
20 INJECTION, SOLUTION INFILTRATION; PERINEURAL ONCE
Status: COMPLETED | OUTPATIENT
Start: 2024-10-08 | End: 2024-10-08

## 2024-10-08 RX ORDER — IBUPROFEN 600 MG/1
600 TABLET, FILM COATED ORAL 4 TIMES DAILY PRN
Qty: 30 TABLET | Refills: 0 | Status: SHIPPED | OUTPATIENT
Start: 2024-10-08

## 2024-10-08 RX ADMIN — ACETAMINOPHEN 650 MG: 325 TABLET ORAL at 19:26

## 2024-10-08 RX ADMIN — TETANUS TOXOID, REDUCED DIPHTHERIA TOXOID AND ACELLULAR PERTUSSIS VACCINE, ADSORBED 0.5 ML: 5; 2.5; 8; 8; 2.5 SUSPENSION INTRAMUSCULAR at 19:26

## 2024-10-08 RX ADMIN — CEPHALEXIN 500 MG: 500 CAPSULE ORAL at 21:41

## 2024-10-08 RX ADMIN — LIDOCAINE HYDROCHLORIDE 20 ML: 10 INJECTION, SOLUTION INFILTRATION; PERINEURAL at 19:25

## 2024-10-08 RX ADMIN — IBUPROFEN 400 MG: 400 TABLET, FILM COATED ORAL at 19:26

## 2024-10-08 ASSESSMENT — PAIN DESCRIPTION - LOCATION
LOCATION: HAND
LOCATION: HAND

## 2024-10-08 ASSESSMENT — PAIN DESCRIPTION - ORIENTATION
ORIENTATION: RIGHT
ORIENTATION: RIGHT

## 2024-10-08 ASSESSMENT — PAIN SCALES - GENERAL
PAINLEVEL_OUTOF10: 10
PAINLEVEL_OUTOF10: 9

## 2024-10-08 ASSESSMENT — PAIN - FUNCTIONAL ASSESSMENT: PAIN_FUNCTIONAL_ASSESSMENT: 0-10

## 2024-10-08 ASSESSMENT — PAIN DESCRIPTION - DESCRIPTORS: DESCRIPTORS: ACHING

## 2024-10-08 NOTE — ED PROVIDER NOTES
[facility] ED  Emergency Department Encounter  Emergency Medicine Resident     Pt Name:Meek hSi  MRN: [id number]  Birthdate 1980  Date of evaluation: 10/8/24  PCP:  Macey Mcpherson DO  Note Started: 7:06 PM EDT      CHIEF COMPLAINT       Chief Complaint   Patient presents with    Hand Injury       HISTORY OF PRESENT ILLNESS  (Location/Symptom, Timing/Onset, Context/Setting, Quality, Duration, Modifying Factors, Severity.)      Meek Shi is a 44 y.o. male who presents with pain to right hand after having multiple I-beam's fall on to it.  Felt immediate pain and noticed blood and covered it up as he cannot tolerate blood.  Unknown when his last tetanus shot was.  Denies any numbness or tingling in the fingers distal to the injury.  He is able to move it but with significant pain.  Otherwise denies any injuries.    PAST MEDICAL / SURGICAL / SOCIAL / FAMILY HISTORY      has a past medical history of Abscess, mastoid, subperiosteal, right, ADHD, Asthma, Bipolar disorder (HCC), Bradycardia, COVID-19, Lumbar sprain, Major depression, Metal foreign body in eye region, Orbital cellulitis on right, Poor dentition, Spontaneous pneumothorax, Under care of team, Under care of team, Under care of team, and Under care of team.       has a past surgical history that includes Hand surgery (Right); Maxillary antrostomy (Right, 10/03/2023); sinusotomy (10/03/2023); sinus surgery (01/24/2024); and Sinus endoscopy (N/A, 1/24/2024).      Social History     Socioeconomic History    Marital status: Single     Spouse name: Not on file    Number of children: Not on file    Years of education: Not on file    Highest education level: Not on file   Occupational History    Not on file   Tobacco Use    Smoking status: Every Day     Current packs/day: 1.00     Average packs/day: 1 pack/day for 44.8 years (44.8 ttl pk-yrs)     Types: Cigarettes     Start date: 12/19/2001     Passive exposure: Never

## 2024-10-08 NOTE — ED PROVIDER NOTES
[facility]     Emergency Department     Faculty Attestation    I performed a history and physical examination of the patient and discussed management with the resident. I reviewed the resident's note and agree with the documented findings and plan of care. Any areas of disagreement are noted on the chart. I was personally present for the key portions of any procedures. I have documented in the chart those procedures where I was not present during the key portions. I have reviewed the emergency nurses triage note. I agree with the chief complaint, past medical history, past surgical history, allergies, medications, social and family history as documented unless otherwise noted below. For Physician Assistant/ Nurse Practitioner cases/documentation I have personally evaluated this patient and have completed at least one if not all key elements of the E/M (history, physical exam, and MDM). Additional findings are as noted.    Patient is left-hand dominant, lacerations on the dorsum of the right hand, normal finger extension.     Manish Diaz MD  10/08/24 1910      Patient had vasovagal syncope during laceration repair, patient states he is normally bradycardic.  Heart bradycardic and regular, equal breath sounds.  EKG Interpretation    Interpreted by emergency department physician    Rhythm: normal sinus   Rate: 45  Axis: normal/66  Ectopy: none  Conduction: normal  ST Segments: no acute change  T Waves: no acute change  Q Waves: none    Clinical Impression: Normal EKG except for sinus bradycardia    GONZALO Rice John A, MD  10/08/24 2034

## 2024-10-08 NOTE — ED NOTES
Pt to ED via triage with c/o right hand laceration. Pt state he was on a ladder tearing apart a trailer when he fell off of the ladder. States metal fell on his right hand. Denies hitting head or LOC. Denies blood thinner use. 3 lacerations noted to right hand. Minimal bleeding noted. Wrapped PTA. Pt is a/ox4, ambulatory, RR even and non labored on room air, call light in reach.

## 2024-10-09 NOTE — DISCHARGE INSTRUCTIONS
You are seen in the emergency department after having a steel beam fall on your hand.  Your tetanus was updated and you will not need another one until 2034.  He was started on a short course of antibiotics which you should take until they are completely gone.  You can use Tylenol Motrin as needed for pain.  You should follow-up with orthopedic surgery clinic in 1 week for reevaluation and further management.  At that time they can determine whether stitches need to come out.  Please see attached instructions for splint and care of your stitches.  You may remove the splint to wash your hand and arms but should avoid movement of that hand and keep it in the splinted position while you are doing this.    If at any point you have significant tingling, numbness of your fingers, pain in your arm, or significantly worsening swelling, you should return the emergency department for further evaluation

## 2024-10-09 NOTE — ED NOTES
Dr. Varner at bedside to attempt stitching pt's right hand. Pt then became nauseas and began to vomit. Dr. Varner call out for a nurse. Upon entry pt was seen passed out in the chair and diaphoretic. Pt was placed in an ER stretcher moved to rm 6. Pt placed on cardiac monitor, IV established, EKG performed. Dr. Israel and Dr. Varner were at bedside to assess.

## 2024-10-10 ENCOUNTER — OFFICE VISIT (OUTPATIENT)
Age: 44
End: 2024-10-10

## 2024-10-10 VITALS — WEIGHT: 185 LBS | BODY MASS INDEX: 23.74 KG/M2 | HEIGHT: 74 IN

## 2024-10-10 DIAGNOSIS — S62.640A CLOSED NONDISPLACED FRACTURE OF PROXIMAL PHALANX OF RIGHT INDEX FINGER, INITIAL ENCOUNTER: Primary | ICD-10-CM

## 2024-10-10 DIAGNOSIS — S62.642A CLOSED NONDISPLACED FRACTURE OF PROXIMAL PHALANX OF RIGHT MIDDLE FINGER, INITIAL ENCOUNTER: ICD-10-CM

## 2024-10-10 LAB
EKG ATRIAL RATE: 45 BPM
EKG P AXIS: 70 DEGREES
EKG P-R INTERVAL: 162 MS
EKG Q-T INTERVAL: 494 MS
EKG QRS DURATION: 96 MS
EKG QTC CALCULATION (BAZETT): 427 MS
EKG R AXIS: 66 DEGREES
EKG T AXIS: 64 DEGREES
EKG VENTRICULAR RATE: 45 BPM

## 2024-10-10 NOTE — PROGRESS NOTES
on file   Social Connections: Not on file   Intimate Partner Violence: Not on file   Housing Stability: Low Risk  (5/28/2024)    Housing Stability Vital Sign     Unable to Pay for Housing in the Last Year: No     Number of Places Lived in the Last Year: 1     Unstable Housing in the Last Year: No     Past Medical History:   Diagnosis Date    Abscess, mastoid, subperiosteal, right 10/2023    ADHD     As a kid    Asthma     Childhood.  Denies difficulty since then.  No current treatment.    Bipolar disorder (HCC)     \"Diagnosed when I was younger but I worked through it.\"    Bradycardia 10/2022    ER visit . Asymptomatic.    COVID-19 10/01/2022    Lumbar sprain 12/07/2023    ER visit    Major depression     Metal foreign body in eye region     Orbital cellulitis on right 10/2023    Poor dentition 01/12/2024    States that dentist said that he needs all teeth removed.    Spontaneous pneumothorax     Left    Under care of team     PCP - Dr. Mcpherson - last visit 12/15/2023    Under care of team     Infectious Disease - Dr. Arora - last visit 11/8/2023    Under care of team     Otolaryngology - Dr. Baker - last visit 12/19/2023    Under care of team     Cardiology - New pt appt 1/24/2024 - same day as surgery.     Past Surgical History:   Procedure Laterality Date    HAND SURGERY Right     Boxer's fracture    MAXILLARY ANTROSTOMY Right 10/03/2023    Ethmoidectomy - [facility]    SINUS ENDOSCOPY N/A 1/24/2024    FUNCTIONAL ENDOSCOPIC SINUS SURGERY IMAGE GUIDED performed by Krish Baker MD at [facility] OR    SINUS SURGERY  01/24/2024    SINUSOTOMY  10/03/2023    With brain stent placement, rt superior orbitotomy with drainage of orbital abscess - [facility]     Family History   Adopted: Yes   Problem Relation Age of Onset    Heart Disease Mother     Kidney stones Mother     Lymphoma Mother     Heart Disease Father     Suicide Father           Electronically signed by Tito Griffin MD on 10/10/2024 at 2:13 PM     Please

## 2024-10-11 ENCOUNTER — OFFICE VISIT (OUTPATIENT)
Dept: FAMILY MEDICINE CLINIC | Age: 44
End: 2024-10-11

## 2024-10-11 VITALS
DIASTOLIC BLOOD PRESSURE: 77 MMHG | HEART RATE: 72 BPM | OXYGEN SATURATION: 98 % | WEIGHT: 188.2 LBS | BODY MASS INDEX: 24.15 KG/M2 | HEIGHT: 74 IN | SYSTOLIC BLOOD PRESSURE: 128 MMHG

## 2024-10-11 DIAGNOSIS — L03.211 FACIAL CELLULITIS: Primary | ICD-10-CM

## 2024-10-11 DIAGNOSIS — K05.30: ICD-10-CM

## 2024-10-11 RX ORDER — LIDOCAINE HYDROCHLORIDE 10 MG/ML
2 INJECTION, SOLUTION INFILTRATION; PERINEURAL ONCE
Status: COMPLETED | OUTPATIENT
Start: 2024-10-11 | End: 2024-10-11

## 2024-10-11 RX ORDER — CLINDAMYCIN HCL 300 MG
300 CAPSULE ORAL 4 TIMES DAILY
Qty: 40 CAPSULE | Refills: 0 | Status: SHIPPED | OUTPATIENT
Start: 2024-10-11 | End: 2024-10-21

## 2024-10-11 RX ORDER — CHLORHEXIDINE GLUCONATE ORAL RINSE 1.2 MG/ML
15 SOLUTION DENTAL 2 TIMES DAILY
Qty: 420 ML | Refills: 2 | Status: SHIPPED | OUTPATIENT
Start: 2024-10-11 | End: 2024-10-25

## 2024-10-11 RX ORDER — CEFTRIAXONE 1 G/1
1000 INJECTION, POWDER, FOR SOLUTION INTRAMUSCULAR; INTRAVENOUS ONCE
Status: COMPLETED | OUTPATIENT
Start: 2024-10-11 | End: 2024-10-11

## 2024-10-11 RX ADMIN — CEFTRIAXONE 1000 MG: 1 INJECTION, POWDER, FOR SOLUTION INTRAMUSCULAR; INTRAVENOUS at 09:08

## 2024-10-11 RX ADMIN — LIDOCAINE HYDROCHLORIDE 2 ML: 10 INJECTION, SOLUTION INFILTRATION; PERINEURAL at 09:09

## 2024-10-11 ASSESSMENT — PATIENT HEALTH QUESTIONNAIRE - PHQ9
1. LITTLE INTEREST OR PLEASURE IN DOING THINGS: NOT AT ALL
SUM OF ALL RESPONSES TO PHQ QUESTIONS 1-9: 0
2. FEELING DOWN, DEPRESSED OR HOPELESS: NOT AT ALL
SUM OF ALL RESPONSES TO PHQ QUESTIONS 1-9: 0
SUM OF ALL RESPONSES TO PHQ9 QUESTIONS 1 & 2: 0

## 2024-10-11 NOTE — PROGRESS NOTES
[facility]  [facility]ICINE  [address]  [address]  [facility] [address]  Dept: [phone]      Meek Shi is a 44 y.o. male who presents today for follow up on his  medical conditions as noted below.      Chief Complaint   Patient presents with    Oral Swelling     Gum pain        Patient Active Problem List:     Bradycardia     Nicotine dependence, cigarettes, uncomplicated     COVID     Spontaneous pneumothorax     History of syncope     Symptomatic bradycardia     Chest pain     Central hypothyroidism     ACS (acute coronary syndrome) (HCC)     Past Medical History:   Diagnosis Date    Abscess, mastoid, subperiosteal, right 10/2023    ADHD     As a kid    Asthma     Childhood.  Denies difficulty since then.  No current treatment.    Bipolar disorder (HCC)     \"Diagnosed when I was younger but I worked through it.\"    Bradycardia 10/2022    ER visit . Asymptomatic.    COVID-19 10/01/2022    Lumbar sprain 12/07/2023    ER visit    Major depression     Metal foreign body in eye region     Orbital cellulitis on right 10/2023    Poor dentition 01/12/2024    States that dentist said that he needs all teeth removed.    Spontaneous pneumothorax     Left    Under care of team     PCP - Dr. Mcpherson - last visit 12/15/2023    Under care of team     Infectious Disease - Dr. Arora - last visit 11/8/2023    Under care of team     Otolaryngology - Dr. Baker - last visit 12/19/2023    Under care of team     Cardiology - New pt appt 1/24/2024 - same day as surgery.      Past Surgical History:   Procedure Laterality Date    HAND SURGERY Right     Boxer's fracture    MAXILLARY ANTROSTOMY Right 10/03/2023    Ethmoidectomy - [facility]    SINUS ENDOSCOPY N/A 1/24/2024    FUNCTIONAL ENDOSCOPIC SINUS SURGERY IMAGE GUIDED performed by Krish Baker MD at [facility] OR    SINUS SURGERY  01/24/2024    SINUSOTOMY  10/03/2023    With brain stent placement, rt superior orbitotomy with

## 2024-10-14 ENCOUNTER — TELEPHONE (OUTPATIENT)
Dept: FAMILY MEDICINE CLINIC | Age: 44
End: 2024-10-14

## 2024-10-14 RX ORDER — CHOLECALCIFEROL (VITAMIN D3) 50 MCG
1 CAPSULE ORAL DAILY
Qty: 30 TABLET | Refills: 5 | Status: SHIPPED | OUTPATIENT
Start: 2024-10-14

## 2024-10-14 NOTE — TELEPHONE ENCOUNTER
Every time he takes the antibiotic, even when he eats he gets acid reflux. It feels like it's stuck in his chest area. He is having loose stools sometimes.    He would like to try the ProBiotic please

## 2024-10-14 NOTE — TELEPHONE ENCOUNTER
Patient was given clindamycin on 10/11/24 and its causing him to feel bad, upset stomach indigestion. He has tried eating before and/ or after medication and he is not sure what to do. Please advise.

## 2024-10-21 ENCOUNTER — OFFICE VISIT (OUTPATIENT)
Dept: ORTHOPEDIC SURGERY | Age: 44
End: 2024-10-21
Payer: COMMERCIAL

## 2024-10-21 VITALS — HEIGHT: 74 IN | BODY MASS INDEX: 24.13 KG/M2 | RESPIRATION RATE: 14 BRPM | WEIGHT: 188 LBS

## 2024-10-21 DIAGNOSIS — S52.125D CLOSED NONDISPLACED FRACTURE OF HEAD OF LEFT RADIUS WITH ROUTINE HEALING, SUBSEQUENT ENCOUNTER: Primary | ICD-10-CM

## 2024-10-21 PROCEDURE — 99213 OFFICE O/P EST LOW 20 MIN: CPT

## 2024-10-21 NOTE — PROGRESS NOTES
HPI: Mr. Shi is a 44-year-old male presents today for a follow-up regarding his left nondisplaced radial head fracture and avulsion fracture at the coronoid tip.  Patient is approximately 4 months postinjury at this point.  He states he is back to doing his normal activity and notes full range of motion at his elbow.  He does state however 3-4 times a day he does experience a locking/popping sensation in his elbow which is painful at times.  States that no certain activity causes this but it is a daily occurrence.  Denies any new symptoms.  States he recently had 2 broken fingers on his contralateral side so he has been using the left arm for a lot of his activity at this point.    Physical exam: Evaluation of the patient's left elbow and upper extremity demonstrates skin to be clean, dry, and intact.  No warmth, erythema or ecchymosis.  2+ radial pulse with brisk capillary refill.  Sensation is gross intact to light touch in all dermatomes.  No obvious swelling.  No tenderness diffusely throughout the elbow.  He does have full range of motion at the elbow without deficit.  5/5 muscle strength testing at the bicep, tricep, pronators, supinators.    Imaging: Three-view x-rays of the left elbow completed on 10/21/2024 were independently reviewed demonstrating a well-healed radial head fracture.  Small avulsion at the coronoid tip although does appear to be healed    Impression/plan: Mr. Shi is a 44-year-old male presents today for a follow-up regarding his left nondisplaced radial head fracture and avulsion fracture at the coronoid tip.  Patient is approximately 4 months postinjury at this point.  He is continue to do very well and has full range of motion at the elbow.  However, given the fact that he is experiencing a popping sensation 3-4 times a day at the elbow it does indicate that he could have a loose body in the joint space especially given the fact that his fractures do appear to be well-healed at

## 2024-10-22 ENCOUNTER — TELEPHONE (OUTPATIENT)
Dept: FAMILY MEDICINE CLINIC | Age: 44
End: 2024-10-22

## 2024-10-22 NOTE — TELEPHONE ENCOUNTER
Patient is calling stating that after taking Clindamycin made him sick, states that on Saturday he had an episode of vomiting, so stopped taking the medication would like to know if there is something else he could take.  Please advise

## 2024-10-23 RX ORDER — DOXYCYCLINE HYCLATE 100 MG
100 TABLET ORAL 2 TIMES DAILY
Qty: 20 TABLET | Refills: 0 | Status: SHIPPED | OUTPATIENT
Start: 2024-10-23 | End: 2024-11-02

## 2024-10-23 NOTE — TELEPHONE ENCOUNTER
Patient called stating that he stopped the clindamycin due to making him sick, and is wanting to know if he should take anything else. Did not finish the antibiotic. Please advise.       Okay to lvm

## 2024-10-24 ENCOUNTER — OFFICE VISIT (OUTPATIENT)
Age: 44
End: 2024-10-24

## 2024-10-24 VITALS — WEIGHT: 188 LBS | BODY MASS INDEX: 24.13 KG/M2 | HEIGHT: 74 IN

## 2024-10-24 DIAGNOSIS — S62.642D CLOSED NONDISPLACED FRACTURE OF PROXIMAL PHALANX OF RIGHT MIDDLE FINGER WITH ROUTINE HEALING, SUBSEQUENT ENCOUNTER: ICD-10-CM

## 2024-10-24 DIAGNOSIS — S62.610D CLOSED DISPLACED FRACTURE OF PROXIMAL PHALANX OF RIGHT INDEX FINGER WITH ROUTINE HEALING, SUBSEQUENT ENCOUNTER: Primary | ICD-10-CM

## 2024-10-24 NOTE — PROGRESS NOTES
Occupational History    Not on file   Tobacco Use    Smoking status: Every Day     Current packs/day: 1.00     Average packs/day: 1 pack/day for 44.8 years (44.8 ttl pk-yrs)     Types: Cigarettes     Start date: 2001     Passive exposure: Never    Smokeless tobacco: Not on file    Tobacco comments:     1 pack daily   Vaping Use    Vaping status: Never Used   Substance and Sexual Activity    Alcohol use: No     Comment: Stopped \"a few years ago\" after family member .    Drug use: Yes     Types: Marijuana (Weed)     Comment: Multiple times daily.    Sexual activity: Not on file   Other Topics Concern    Not on file   Social History Narrative    Not on file     Social Determinants of Health     Financial Resource Strain: Low Risk  (10/25/2023)    Overall Financial Resource Strain (CARDIA)     Difficulty of Paying Living Expenses: Not very hard   Food Insecurity: No Food Insecurity (10/4/2024)    Received from [facility]    Hunger Screening     Within the past 12 months we worried whether our food would run out before we got money to buy more.: Never True     Within the past 12 months the food we bought just didn't last and we didn't have money to get more.: Never True   Transportation Needs: No Transportation Needs (2024)    PRAPARE - Transportation     Lack of Transportation (Medical): No     Lack of Transportation (Non-Medical): No   Physical Activity: Not on file   Stress: Not on file   Social Connections: Not on file   Intimate Partner Violence: Not on file   Housing Stability: Low Risk  (2024)    Housing Stability Vital Sign     Unable to Pay for Housing in the Last Year: No     Number of Places Lived in the Last Year: 1     Unstable Housing in the Last Year: No     Past Medical History:   Diagnosis Date    Abscess, mastoid, subperiosteal, right 10/2023    ADHD     As a kid    Asthma     Childhood.  Denies difficulty since then.  No current treatment.    Bipolar disorder (HCC)

## 2024-10-28 ENCOUNTER — HOSPITAL ENCOUNTER (OUTPATIENT)
Dept: CT IMAGING | Age: 44
Discharge: HOME | End: 2024-10-30
Payer: COMMERCIAL

## 2024-10-28 DIAGNOSIS — S52.125D CLOSED NONDISPLACED FRACTURE OF HEAD OF LEFT RADIUS WITH ROUTINE HEALING, SUBSEQUENT ENCOUNTER: ICD-10-CM

## 2024-10-28 PROCEDURE — 73200 CT UPPER EXTREMITY W/O DYE: CPT

## 2024-10-31 ENCOUNTER — TELEPHONE (OUTPATIENT)
Dept: ORTHOPEDIC SURGERY | Age: 44
End: 2024-10-31

## 2024-10-31 NOTE — TELEPHONE ENCOUNTER
----- Message from Mikal Bosch PA-C sent at 10/30/2024  4:05 PM EDT -----  Please reach out to the patient and inform that I did get a chance to review his CT scan and it does not show any evidence of intra-articular loose body.  It does show evidence of the prior fracture and it does appear that it is not fully healed at this point and the fracture line is still largely visible.  I would recommend he cut back on some of his activity at this point and we could start him in formal physical therapy if he is wishing to proceed that way.  I would like to follow-up with the patient in 6 weeks, he may call to schedule an appointment or we could aid him in scheduling the appointment.    Thanks  ----- Message -----  From: Lorraine Vernon Incoming Radiant Results From [employer]  Sent: 10/29/2024   8:52 PM EDT  To: Mikal Bosch PA-C

## 2024-10-31 NOTE — TELEPHONE ENCOUNTER
Patient was contacted with results and recommendations.  Patient did not want an order for formal PT at this time.  Patient did schedule a follow up appointment with Mikal Bosch PA-C.

## 2024-12-05 ENCOUNTER — OFFICE VISIT (OUTPATIENT)
Dept: ORTHOPEDIC SURGERY | Age: 44
End: 2024-12-05
Payer: COMMERCIAL

## 2024-12-05 VITALS — WEIGHT: 188 LBS | HEIGHT: 74 IN | BODY MASS INDEX: 24.13 KG/M2 | RESPIRATION RATE: 14 BRPM

## 2024-12-05 DIAGNOSIS — S52.125G CLOSED NONDISPLACED FRACTURE OF HEAD OF LEFT RADIUS WITH DELAYED HEALING, SUBSEQUENT ENCOUNTER: Primary | ICD-10-CM

## 2024-12-05 PROCEDURE — 99213 OFFICE O/P EST LOW 20 MIN: CPT

## 2024-12-06 NOTE — PROGRESS NOTES
HPI: Mr. Shi is a 44-year-old male who presents to clinic today for follow-up regarding his left elbow nondisplaced fracture of the radial head/neck.  He is here as we did send him for a CT scan the last time I saw him as he was having some painful popping and clicking into the elbow and I wanted to rule out any loose body in the joint secondary to the fracture.  He presents today to review the results of that CT scan and discuss additional treatment options.  We did review the CT together in office today and it does redemonstrate the slightly impacted/nondisplaced fracture at the radial head with the fracture line extending down to the level of the radial neck.  The fracture line is still largely visible at the time of the CT.  No obvious joint effusion but he does have some mild degenerative changes at the ulnohumeral joint.  At this time patient states that the painful popping is becoming less prominent and he is able to do his normal activities.  He states the only time he has pain is if he is in full extension and trying to push the force with his hand.  I did discuss with him that these types of motions could put pressure at the site of the fracture and that is why it could be causing him pain.  Other than that he has no complaints at this time and states that he believes the elbow is healing well.  Given this I would recommend he try to limit some of the lifting activities and placing forces through the elbow similar to the ones he has described to me today.  Ultimately, I will see him back in the clinic on an as needed basis for any changes in his symptoms.  Patient is amendable to the plan as outlined above and was encouraged to contact our office with any questions or concerns that he may have.      Total time spent on visit: 25 minutes

## 2025-01-15 ENCOUNTER — OFFICE VISIT (OUTPATIENT)
Dept: INFECTIOUS DISEASES | Age: 45
End: 2025-01-15
Payer: COMMERCIAL

## 2025-01-15 VITALS
RESPIRATION RATE: 18 BRPM | DIASTOLIC BLOOD PRESSURE: 65 MMHG | HEART RATE: 64 BPM | OXYGEN SATURATION: 99 % | WEIGHT: 182.63 LBS | SYSTOLIC BLOOD PRESSURE: 108 MMHG | HEIGHT: 74 IN | BODY MASS INDEX: 23.44 KG/M2

## 2025-01-15 DIAGNOSIS — J32.1 CHRONIC FRONTAL SINUSITIS: Primary | ICD-10-CM

## 2025-01-15 DIAGNOSIS — J32.0 CHRONIC MAXILLARY SINUSITIS: ICD-10-CM

## 2025-01-15 PROCEDURE — 99213 OFFICE O/P EST LOW 20 MIN: CPT | Performed by: INTERNAL MEDICINE

## 2025-01-15 ASSESSMENT — ENCOUNTER SYMPTOMS
SINUS PAIN: 1
GASTROINTESTINAL NEGATIVE: 1
RESPIRATORY NEGATIVE: 1
SINUS PRESSURE: 1

## 2025-01-15 NOTE — PROGRESS NOTES
[facility]  Office Progress Note  Today's Date and Time: 1/15/2025, 11:41 AM    Impression:     1. Chronic frontal sinusitis    2. Chronic maxillary sinusitis         Recommendations     Assessment & Plan  1. Recurrent sinusitis.  He reports frequent nasal drainage, with the left side often producing clear or green mucus and occasional large, black, and grayish blockages from the right side every 1 to 2 weeks. Despite using nasal rinses, he continues to experience significant drainage and blockages. He also mentions occasional pressure but no persistent pain. It was explained that maintaining open sinus passages is crucial to prevent infections. He was advised to continue with nasal rinses and to ensure thorough clearance of nasal passages to avoid blockages. The potential benefits of using a humidifier were discussed, although it may increase secretions. He was instructed to keep his upcoming appointment with the ENT specialist to discuss further management strategies for his sinus issues.       I have ordered the following medications/ labs:  No orders of the defined types were placed in this encounter.     No orders of the defined types were placed in this encounter.      Chief complaint/reason for consultation:     Chief Complaint   Patient presents with    Chronic frontal sinusitis     Follow up        History of Present Illness:   Meek Shi is a 44 y.o.-year-old male who comes in for follow-up of recurrent/chronic sinusitis  History of Present Illness  The patient presents for a follow-up of recurrent sinusitis.    He has a history of right frontal sinus infection and has undergone multiple operative procedures, most recently in 06/2024. He reports an increase in nasal discharge, necessitating frequent nose blowing or sniffing. The left nostril consistently produces clear or green mucus, often in large quantities. The right nostril, however, remains mostly clear with occasional  67

## 2025-02-11 ENCOUNTER — OFFICE VISIT (OUTPATIENT)
Age: 45
End: 2025-02-11
Payer: COMMERCIAL

## 2025-02-11 VITALS — HEIGHT: 74 IN | BODY MASS INDEX: 23.36 KG/M2 | WEIGHT: 182 LBS

## 2025-02-11 DIAGNOSIS — M79.641 RIGHT HAND PAIN: Primary | ICD-10-CM

## 2025-02-11 PROCEDURE — 99213 OFFICE O/P EST LOW 20 MIN: CPT | Performed by: ORTHOPAEDIC SURGERY

## 2025-02-11 NOTE — PROGRESS NOTES
Suicide Father           Electronically signed by Tito Griffin MD on 2/11/2025 at 4:49 PM     Please note that this chart was generated using voice recognition Dragon dictation software.  Although every effort was made to ensure the accuracy of this automated transcription, some errors in transcription may have occurred.

## 2025-02-17 ENCOUNTER — HOSPITAL ENCOUNTER (OUTPATIENT)
Facility: CLINIC | Age: 45
Setting detail: THERAPIES SERIES
Discharge: HOME | End: 2025-02-17
Payer: COMMERCIAL

## 2025-02-17 PROCEDURE — 97110 THERAPEUTIC EXERCISES: CPT

## 2025-02-17 PROCEDURE — 97165 OT EVAL LOW COMPLEX 30 MIN: CPT

## 2025-02-17 PROCEDURE — 97760 ORTHOTIC MGMT&TRAING 1ST ENC: CPT

## 2025-02-17 PROCEDURE — 97140 MANUAL THERAPY 1/> REGIONS: CPT

## 2025-02-17 NOTE — THERAPY EVALUATION
[] [facility]  Outpatient Rehabilitation &  Therapy  [address]  [phone]  F: [phone] [x] [facility]  Outpatient Rehabilitation &  Therapy  [address]   [address]  P: [phone]  F: [phone] [] [facility]  Outpatient Rehabilitation &  Therapy  [address]  P: [phone]  F: [phone] [] [facility]  Outpatient Rehabilitation &  Therapy  [address]   P: [phone]  F: [phone] [] [facility]   Outpatient Rehabilitation   & Therapy  [address]  P: [phone]   F: [phone]       Occupational Therapy Hand & Upper Extremity  Initial Evaluation    Date: 2025      Patient: Meek Shi  : 1980  MRN: [id number]  Referring Provider:  Tito Griffin MD    Insurance: [facility]   Medical Diagnosis: M79.641 (ICD-10-CM) - Right hand pain   Right hand, index finger Possible trigger vs A2 pulley rupture   Post fracture to the right index P1 and right middle P1     Rehab Codes: pain in right hand M79.641 and stiffness in right wrist M25.631  Onset Date: Oct 8th, 2024     Next  Appt: ESPERANZA    Insurance/Authorization as of Initial Eval:   Visit Limit: 30   Visits Remainin   Hard Max?       []  yes      [x]  no   Are visits combined? NO   Authorization Required?   [] yes     [x]  no         Subjective:   Current Complaints:   Patient sustained a proximal fracture of his right second and third fingers 4 months ago; treated with splinting.   After he started using the hand, he noticed a clicking in the index, and he can not fully bend the index or middle finger .   He notices difficulty pinching and grabbing during use of the hand.     Mechanism of Injury: trauma    Surgery: []  Yes [x]  No    Precautions:  WBAT    Involved Extremity: Right     Dominant Extremity: Left    Orthosis: He had a splint after the original but is no longer wearing

## 2025-02-25 ENCOUNTER — HOSPITAL ENCOUNTER (OUTPATIENT)
Facility: CLINIC | Age: 45
Setting detail: THERAPIES SERIES
Discharge: HOME | End: 2025-02-25
Payer: COMMERCIAL

## 2025-02-25 PROCEDURE — 97110 THERAPEUTIC EXERCISES: CPT

## 2025-03-04 ENCOUNTER — HOSPITAL ENCOUNTER (OUTPATIENT)
Facility: CLINIC | Age: 45
Setting detail: THERAPIES SERIES
Discharge: HOME | End: 2025-03-04
Payer: COMMERCIAL

## 2025-03-04 ENCOUNTER — TELEPHONE (OUTPATIENT)
Age: 45
End: 2025-03-04

## 2025-03-04 DIAGNOSIS — M25.521 RIGHT ELBOW PAIN: Primary | ICD-10-CM

## 2025-03-04 PROCEDURE — 97110 THERAPEUTIC EXERCISES: CPT

## 2025-03-04 NOTE — TELEPHONE ENCOUNTER
----- Message from Dr. Tito Griffin MD sent at 3/4/2025 12:08 PM EST -----  He is a patient of mine and had a fall.  Can we get him in for elbow pain.  He will need xrays.  HE cna see me or Aida    thanks

## 2025-03-04 NOTE — FLOWSHEET NOTE
[facility]  Outpatient Rehabilitation &  Therapy  [address]     [phone]  F: [phone]   [x] [facility]  Outpatient Rehabilitation &  Therapy  [address]   [address]  P: [phone]  F: [phone] [] [facility]  Outpatient Rehabilitation &  Therapy  [address]  P: [phone]  F: [phone]  [] [facility]   Outpatient Rehabilitation & Therapy  [address]  P: [phone]   F: [phone]     Occupational Therapy Daily Treatment Note    Date:  3/4/2025  Patient Name:  Meek Shi    :  1980  MRN: [id number]  Referring Provider:  Tito Griffin MD     Insurance: [employer] [facility] [facility]   Medical Diagnosis: M79.641 (ICD-10-CM) - Right hand pain   Right hand, index finger Possible trigger vs A2 pulley rupture   Post fracture to the right index P1 and right middle P1      Rehab Codes: pain in right hand M79.641 and stiffness in right wrist M25.631  Onset Date: Oct 8th, 2024      Next  Appt: PRN     Insurance/Authorization as of Initial Eval:   Visit Limit: 30   Visits Remainin   Hard Max?       []  yes      [x]  no   Are visits combined? NO   Authorization Required?   [] yes     [x]  no             Date of OT eval: 25  Visit# / total visits: 3/10; Progress note due at visit 10    Cancels/No Shows: 0      Subjective:      Pt Comments:   He forgot to change his finger splint at night, and the skin is a little irritated.     Also, he fell out of his truck on 25. He now has lateral elbow pain with lifting and driving.       Pain:  [] Yes  [x] No   Location: NA   Pain Rating:  NA/10 (0-10 scale)  Pain altered Tx:  [x] No  [] Yes    Action: NA        Objective:        ROM/Strength: No triggering today with ring splint on     Precautions: splint at all times to prevent triggering.     Exercises:  EXERCISE    REPS/     TIME  WEIGHT/    LEVEL COMMENTS   Heated stretch  10min  Digit

## 2025-03-04 NOTE — TELEPHONE ENCOUNTER
Patient called to schedule an appointment for his right elbow. Please submit order for xray.  Patient is requesting a return call once order has been placed and would like directions to the radiology department on the premises.     Thank you.

## 2025-03-04 NOTE — TELEPHONE ENCOUNTER
Left patient a message letting him know that we were reaching out to schedule appointment with Dr. Griffin or Carmenza. Patient will need to get XR prior to the appointment and orders will be placed

## 2025-03-06 ENCOUNTER — HOSPITAL ENCOUNTER (OUTPATIENT)
Age: 45
Discharge: HOME | End: 2025-03-08
Payer: COMMERCIAL

## 2025-03-06 PROCEDURE — 73080 X-RAY EXAM OF ELBOW: CPT

## 2025-03-10 ENCOUNTER — HOSPITAL ENCOUNTER (OUTPATIENT)
Facility: CLINIC | Age: 45
Setting detail: THERAPIES SERIES
Discharge: HOME | End: 2025-03-10
Payer: COMMERCIAL

## 2025-03-10 PROCEDURE — 97110 THERAPEUTIC EXERCISES: CPT

## 2025-03-10 NOTE — FLOWSHEET NOTE
[facility]  Outpatient Rehabilitation &  Therapy  [address]     [phone]  F: [phone]   [x] [facility]  Outpatient Rehabilitation &  Therapy  [address]   [address]  P: [phone]  F: [phone] [] [facility]  Outpatient Rehabilitation &  Therapy  [address]  P: [phone]  F: [phone]  [] [facility]   Outpatient Rehabilitation & Therapy  [address]  P: [phone]   F: [phone]     Occupational Therapy Daily Treatment Note    Date:  3/10/2025  Patient Name:  Meek Shi    :  1980  MRN: [id number]  Referring Provider:  Tito Griffin MD     Insurance: [facility]   Medical Diagnosis: M79.641 (ICD-10-CM) - Right hand pain   Right hand, index finger Possible trigger vs A2 pulley rupture   Post fracture to the right index P1 and right middle P1      Rehab Codes: pain in right hand M79.641 and stiffness in right wrist M25.631  Onset Date: Oct 8th, 2024      Next  Appt: PRN     Insurance/Authorization as of Initial Eval:   Visit Limit: 30   Visits Remainin   Hard Max?       []  yes      [x]  no   Are visits combined? NO   Authorization Required?   [] yes     [x]  no             Date of OT eval: 25  Visit# / total visits: 4/10; Progress note due at visit 10    Cancels/No Shows: 0      Subjective:      Pt Comments:   The right elbow has been painful since he fell out of his truck on 25.   He got an xray of the elbow, and he will follow up with MD on Friday.     The right index finger has been feeling fine with the splint.     The left elbow has been clicking since he broke it last year.     Pain:  [] Yes  [x] No   Location: NA   Pain Rating:  NA/10 (0-10 scale)  Pain altered Tx:  [x] No  [] Yes    Action: NA        Objective:        ROM/Strength: No triggering today with MP spacer      Precautions: splint at all times to prevent triggering.

## 2025-03-12 ENCOUNTER — HOSPITAL ENCOUNTER (EMERGENCY)
Age: 45
Discharge: HOME | End: 2025-03-12
Attending: EMERGENCY MEDICINE
Payer: COMMERCIAL

## 2025-03-12 VITALS
OXYGEN SATURATION: 100 % | HEART RATE: 65 BPM | RESPIRATION RATE: 18 BRPM | TEMPERATURE: 97.5 F | DIASTOLIC BLOOD PRESSURE: 70 MMHG | SYSTOLIC BLOOD PRESSURE: 119 MMHG

## 2025-03-12 DIAGNOSIS — K08.89 PAIN, DENTAL: Primary | ICD-10-CM

## 2025-03-12 PROCEDURE — 6370000000 HC RX 637 (ALT 250 FOR IP)

## 2025-03-12 PROCEDURE — 99283 EMERGENCY DEPT VISIT LOW MDM: CPT

## 2025-03-12 RX ORDER — PENICILLIN V POTASSIUM 250 MG/1
500 TABLET, FILM COATED ORAL ONCE
Status: COMPLETED | OUTPATIENT
Start: 2025-03-12 | End: 2025-03-12

## 2025-03-12 RX ORDER — PENICILLIN V POTASSIUM 500 MG/1
500 TABLET, FILM COATED ORAL 4 TIMES DAILY
Qty: 28 TABLET | Refills: 0 | Status: SHIPPED | OUTPATIENT
Start: 2025-03-12 | End: 2025-03-19

## 2025-03-12 RX ADMIN — PENICILLIN V POTASSIUM 500 MG: 250 TABLET, FILM COATED ORAL at 16:15

## 2025-03-12 ASSESSMENT — PAIN DESCRIPTION - ORIENTATION: ORIENTATION: LEFT

## 2025-03-12 ASSESSMENT — PAIN DESCRIPTION - PAIN TYPE: TYPE: ACUTE PAIN

## 2025-03-12 ASSESSMENT — PAIN DESCRIPTION - LOCATION: LOCATION: TEETH

## 2025-03-12 ASSESSMENT — LIFESTYLE VARIABLES
HOW OFTEN DO YOU HAVE A DRINK CONTAINING ALCOHOL: NEVER
HOW MANY STANDARD DRINKS CONTAINING ALCOHOL DO YOU HAVE ON A TYPICAL DAY: PATIENT DOES NOT DRINK

## 2025-03-12 ASSESSMENT — PAIN SCALES - GENERAL: PAINLEVEL_OUTOF10: 8

## 2025-03-12 ASSESSMENT — PAIN DESCRIPTION - DESCRIPTORS: DESCRIPTORS: ACHING

## 2025-03-12 ASSESSMENT — PAIN - FUNCTIONAL ASSESSMENT: PAIN_FUNCTIONAL_ASSESSMENT: 0-10

## 2025-03-12 NOTE — DISCHARGE INSTRUCTIONS
Take your medication as indicated and prescribed.  If you are given an antibiotic, then make sure you get the prescription filled and take the antibiotics until finished.  Drink plenty of water while taking the antibiotics.  Avoid drinking alcohol or drinks that have caffeine in it while taking antibiotics.       For pain use ibuprofen (Motrin / Advil) or acetaminophen (Tylenol), unless prescribed medications that have acetaminophen in it.  You can take over the counter acetaminophen tablets (1 - 2 tablets of the 500-mg strength every 6 hours) or ibuprofen tablets (2 tablets every 4 hours).    PLEASE RETURN TO THE EMERGENCY DEPARTMENT IMMEDIATELY for worsening symptoms, swelling to your face, redness on your face, drainage from the tooth, or if you develop any concerning symptoms such as: high fever not relieved by acetaminophen (Tylenol) and/or ibuprofen (Motrin / Advil), chills, shortness of breath, chest pain, feeling of your heart fluttering or racing, persistent nausea and/or vomiting, vomiting up blood, blood in your stool, numbness, loss of consciousness, weakness or tingling in the arms or legs or change in color of the extremities, changes in mental status, persistent headache, blurry vision, loss of bladder / bowel control, unable to follow up with your physician, or other any other care or concern.

## 2025-03-12 NOTE — ED NOTES
Pt presents to the ED through triage  Pt complains of dental pain to the left lower side of mouth  Pt states unsure if tooth is broken  Pt denies difficulty with eating or drinking  Pt denies shortness of breath  All questions answered and needs met at this time  Whiteboard updated

## 2025-03-14 ENCOUNTER — OFFICE VISIT (OUTPATIENT)
Age: 45
End: 2025-03-14
Payer: COMMERCIAL

## 2025-03-14 VITALS — WEIGHT: 182 LBS | BODY MASS INDEX: 23.36 KG/M2 | HEIGHT: 74 IN

## 2025-03-14 DIAGNOSIS — M25.521 RIGHT ELBOW PAIN: Primary | ICD-10-CM

## 2025-03-14 PROCEDURE — 99213 OFFICE O/P EST LOW 20 MIN: CPT | Performed by: NURSE PRACTITIONER

## 2025-03-14 NOTE — PROGRESS NOTES
[facility] Orthopedics & Sports Medicine      [facility] PHYSICIANS [facility] ORTHOPAEDICS AND SPORTS MEDICINE  [address] [place] [address] [address]  [place] [address]  Dept: [phone]  Dept Fax: [phone]    Chief Compliant:  Chief Complaint   Patient presents with    Elbow Pain     Right elbow pain          History of Present Illness:  3/14/25:This is a pleasant 45 y.o. male who is here for evaluation of right elbow pain.  States about 2 to 3 weeks ago he fell out of his truck landing onto the right elbow.  He states ever since the fall he has pain on the inside of the elbow.  This is worse with use of the arm such as carrying a furnace.  He states he is taking ibuprofen more recently due to a tooth infection.  He has not iced the elbow.  He denies any numbness or tingling or radiating symptoms.    Physical Exam: Right elbow: Skin intact.  No ecchymosis or erythema or edema.  There is TTP over the medial epicondyle and antecubital region of the elbow.  Full range of motion of the elbow.  Elbow stable to varus and valgus stress.    Imaging: None today.  Independently reviewed x-rays of the right elbow obtained on 3/6/2025 which demonstrates no acute fractures, dislocations or effusions.      Assessment and Plan:    This is a pleasant 45 y.o. male who has a right elbow contusion.  Reviewed imaging with the patient which demonstrate no acute fracture or dislocation.  Discussed etiology symptoms and treatment options.  Recommend conservative treatment in the form of elbow compression sleeve, icing, NSAIDs.  Expect with time, his symptoms will continue to improve.  He may follow-up as needed.         Past History:    Current Outpatient Medications:     penicillin v potassium (VEETID) 500 MG tablet, Take 1 tablet by mouth 4 times daily for 7 days, Disp: 28 tablet, Rfl: 0    Probiotic Product (4X PROBIOTIC) TABS, Take 1 tablet by mouth daily (Patient not taking: Reported on 1/15/2025), Disp:

## 2025-03-14 NOTE — ED PROVIDER NOTES
[facility]     Emergency Department     Faculty Attestation    I performed a history and physical examination of the patient and discussed management with the resident. I reviewed the resident’s note and agree with the documented findings and plan of care. Any areas of disagreement are noted on the chart. I was personally present for the key portions of any procedures. I have documented in the chart those procedures where I was not present during the key portions. I have reviewed the emergency nurses triage note. I agree with the chief complaint, past medical history, past surgical history, allergies, medications, social and family history as documented unless otherwise noted below. Documentation of the HPI, Physical Exam and Medical Decision Making performed by medical students or scribes is based on my personal performance of the HPI, PE and MDM. For Physician Assistant/ Nurse Practitioner cases/documentation I have personally evaluated this patient and have completed at least one if not all key elements of the E/M (history, physical exam, and MDM). Additional findings are as noted.    Vital signs:   Vitals:    03/12/25 1528   BP: 119/70   Pulse: 65   Resp: 18   Temp: 97.5 °F (36.4 °C)   SpO2: 100%                Dianna Moore M.D,  Attending Emergency  Physician            Dianna Moore MD  03/14/25 8063

## 2025-03-24 ENCOUNTER — HOSPITAL ENCOUNTER (OUTPATIENT)
Facility: CLINIC | Age: 45
Setting detail: THERAPIES SERIES
Discharge: HOME | End: 2025-03-24
Payer: COMMERCIAL

## 2025-03-24 PROCEDURE — 97110 THERAPEUTIC EXERCISES: CPT

## 2025-03-24 NOTE — FLOWSHEET NOTE
[facility]  Outpatient Rehabilitation &  Therapy  [address]     [phone]  F: [phone]   [x] [facility]  Outpatient Rehabilitation &  Therapy  [address]   [address]  P: [phone]  F: [phone] [] [facility]  Outpatient Rehabilitation &  Therapy  [address]  P: [phone]  F: [phone]  [] [facility]   Outpatient Rehabilitation & Therapy  [address]  P: [phone]   F: [phone]     Occupational Therapy Daily Treatment Note    Date:  3/24/2025  Patient Name:  Meek Shi    :  1980  MRN: [id number]  Referring Provider:  Tito Griffin MD     Insurance: [facility]   Medical Diagnosis: M79.641 (ICD-10-CM) - Right hand pain   Right hand, index finger Possible trigger vs A2 pulley rupture   Post fracture to the right index P1 and right middle P1      Rehab Codes: pain in right hand M79.641 and stiffness in right wrist M25.631  Onset Date: Oct 8th, 2024      Next  Appt: PRN     Insurance/Authorization as of Initial Eval:   Visit Limit: 30   Visits Remainin   Hard Max?       []  yes      [x]  no   Are visits combined? NO   Authorization Required?   [] yes     [x]  no             Date of OT eval: 25  Visit# / total visits: 5/10; Progress note due at visit 10    Cancels/No Shows: 0      Subjective:      Pt Comments:   He is still wearing the splint on the finger. He has been using the splint consistently for 6 weeks.     He saw MD for the elbow, and there was no fracture. He is struggling with lifting heavy things.     He had KJ with him today    Pain:  [] Yes  [x] No   Location: NA   Pain Rating:  NA/10 (0-10 scale)  Pain altered Tx:  [x] No  [] Yes    Action: NA        Objective:        ROM/Strength: No triggering today with MP spacer      Precautions: splint at all times to prevent triggering.     Exercises:  EXERCISE    REPS/     TIME  WEIGHT/    LEVEL

## 2025-04-07 ENCOUNTER — HOSPITAL ENCOUNTER (OUTPATIENT)
Facility: CLINIC | Age: 45
Setting detail: THERAPIES SERIES
Discharge: HOME | End: 2025-04-07
Payer: COMMERCIAL

## 2025-04-07 PROCEDURE — 97110 THERAPEUTIC EXERCISES: CPT

## 2025-04-07 NOTE — DISCHARGE SUMMARY
[facility]  Outpatient Rehabilitation &  Therapy  [address]     [phone]  F: [phone]   [x] [facility]  Outpatient Rehabilitation &  Therapy  [address]   [address]  P: [phone]  F: [phone] [] [facility]  Outpatient Rehabilitation &  Therapy  [address]  P: [phone]  F: [phone]  [] [facility]   Outpatient Rehabilitation & Therapy  [address]  P: [phone]   F: [phone]     Occupational Therapy Daily Treatment Note/Discharge     Date:  2025  Patient Name:  Meek Shi    :  1980  MRN: [id number]  Referring Provider:  Tito Griffin MD     Insurance: [facility]   Medical Diagnosis: M79.641 (ICD-10-CM) - Right hand pain   Right hand, index finger Possible trigger vs A2 pulley rupture   Post fracture to the right index P1 and right middle P1      Rehab Codes: pain in right hand M79.641 and stiffness in right wrist M25.631  Onset Date: Oct 8th, 2024      Next  Appt: ESPERANZA     Insurance/Authorization as of Initial Eval:   Visit Limit: 30   Visits Remainin   Hard Max?       []  yes      [x]  no   Are visits combined? NO   Authorization Required?   [] yes     [x]  no             Date of OT eval: 25  Visit# / total visits: 6/10;     Cancels/No Shows: 0      Subjective:      Pt Comments:   He has weaned from the finger ring splint. He can make a better fist with less pain, but there is still a slight click.     The elbow is getting worse. It is painful with touch the medial elbow, and he has pain with lifting.     Pain:  [] Yes  [x] No   Location: NA   Pain Rating:  NA/10 (0-10 scale)  Pain altered Tx:  [x] No  [] Yes    Action: NA        Objective:      ROM/STRENGTH:     Date of Measurements  25 Rt  Lt             Index ext/flex   MP  PIP  DIP    0-85  0-105  0-70    0-90  0-105  0-70   Digit flex from the DPC (cm) Flex WNL, but has a slight click

## 2025-04-10 ENCOUNTER — HOSPITAL ENCOUNTER (OUTPATIENT)
Dept: PREADMISSION TESTING | Age: 45
Discharge: HOME | End: 2025-04-14
Payer: COMMERCIAL

## 2025-04-10 VITALS
DIASTOLIC BLOOD PRESSURE: 65 MMHG | OXYGEN SATURATION: 97 % | SYSTOLIC BLOOD PRESSURE: 109 MMHG | HEART RATE: 89 BPM | TEMPERATURE: 97.9 F | BODY MASS INDEX: 24.38 KG/M2 | HEIGHT: 74 IN | RESPIRATION RATE: 16 BRPM | WEIGHT: 190 LBS

## 2025-04-10 DIAGNOSIS — Z01.818 PRE-OP TESTING: Primary | ICD-10-CM

## 2025-04-10 LAB
ANION GAP SERPL CALCULATED.3IONS-SCNC: 9 MMOL/L (ref 9–16)
BUN SERPL-MCNC: 14 MG/DL (ref 6–20)
CALCIUM SERPL-MCNC: 9.3 MG/DL (ref 8.6–10.4)
CHLORIDE SERPL-SCNC: 106 MMOL/L (ref 98–107)
CO2 SERPL-SCNC: 28 MMOL/L (ref 20–31)
CREAT SERPL-MCNC: 0.9 MG/DL (ref 0.7–1.2)
ERYTHROCYTE [DISTWIDTH] IN BLOOD BY AUTOMATED COUNT: 13.2 % (ref 11.8–14.4)
GFR, ESTIMATED: >90 ML/MIN/1.73M2
GLUCOSE SERPL-MCNC: 100 MG/DL (ref 74–99)
HCT VFR BLD AUTO: 40.7 % (ref 40.7–50.3)
HGB BLD-MCNC: 13 G/DL (ref 13–17)
MCH RBC QN AUTO: 29.7 PG (ref 25.2–33.5)
MCHC RBC AUTO-ENTMCNC: 31.9 G/DL (ref 28.4–34.8)
MCV RBC AUTO: 92.9 FL (ref 82.6–102.9)
NRBC BLD-RTO: 0 PER 100 WBC
PLATELET # BLD AUTO: 198 K/UL (ref 138–453)
PMV BLD AUTO: 11 FL (ref 8.1–13.5)
POTASSIUM SERPL-SCNC: 5.1 MMOL/L (ref 3.7–5.3)
RBC # BLD AUTO: 4.38 M/UL (ref 4.21–5.77)
SODIUM SERPL-SCNC: 143 MMOL/L (ref 136–145)
WBC OTHER # BLD: 8.6 K/UL (ref 3.5–11.3)

## 2025-04-10 PROCEDURE — 36415 COLL VENOUS BLD VENIPUNCTURE: CPT

## 2025-04-10 PROCEDURE — 93005 ELECTROCARDIOGRAM TRACING: CPT

## 2025-04-10 PROCEDURE — 85027 COMPLETE CBC AUTOMATED: CPT

## 2025-04-10 PROCEDURE — 80048 BASIC METABOLIC PNL TOTAL CA: CPT

## 2025-04-10 ASSESSMENT — PAIN DESCRIPTION - ORIENTATION: ORIENTATION: RIGHT

## 2025-04-10 ASSESSMENT — PAIN DESCRIPTION - LOCATION: LOCATION: ELBOW

## 2025-04-10 ASSESSMENT — PAIN SCALES - GENERAL: PAINLEVEL_OUTOF10: 3

## 2025-04-10 NOTE — PRE-PROCEDURE INSTRUCTIONS
ARRIVE AT THE HOSPITAL ON Friday, 4/25/25 at 11:30 AM    Once you enter the [facility] barak @ [address], take the elevators to the second floor.  Check-In is at the surgery registration desk.      Continue to take your home medications as you normally do up to and including the night before surgery with the exception of any blood thinning medications.      Blood Thinning Medications:  Please stop prescription blood thinning medications such as Apixaban (Eliquis); Clopidogrel (Plavix); Dabigatran (Pradaxa); Prasugrel (Effient); Rivaroxaban (Xarelto); Ticagrelor (Brilinta); Warfarin (Coumadin) only as directed by your surgeon and/or the prescribing physician    Some common examples of other medications that can thin your blood are: Aspirin, Ibuprofen (Advil, Motrin), Naproxen (Aleve), Meloxicam (Mobic), Celecoxib (Celebrex), Fish Oil, many Herbal Supplements.  These medications should usually be stopped at least 7 days prior to surgery.        Stop using OTC pain relievers containing Aspirin, Ibuprofen (Advil, Motrin) or Naproxen (Aleve) seven days prior to surgery.  It is OK to continue using Tylenol for pain the week prior to surgery.    Failure to stop certain medications may interfere with your scheduled surgery.    If you receive instructions from your surgeon regarding what medications to stop prior to surgery, please follow those specific instructions.      Please take the following medication(s) the day of surgery with a small sip of water:  none      PREPARING FOR YOUR SURGERY:     Before surgery, you can play an important role in your own health. Because skin is not sterile, we need to be sure that your skin is as free of germs as possible before surgery by carefully washing before surgery.  Preparing or “prepping” skin before surgery can reduce the risk of a “surgical site infection.”  Do not shave the area of your body where your surgery will be performed unless you received  specific permission from your physician.    Preoperative Bathing Instructions  Bathe or shower the day of surgery using an anti-bacterial soap  Wear clean clothing after bathing.  Shampoo hair before surgery  Keep nails clean    Do not apply lotion/creams/oils; cosmetics; perfumes/aftershave; deodorant; alcohol based hair or skin products the day of surgery.  Do not shave the area of your body where your surgery will be performed unless you receive specific permission from your surgeon.       Patient Instructions:    If you are having any type of anesthesia you are to have nothing to eat or drink after midnight the night before your surgery.  This includes gum, hard candy, mints, water or smoking or chewing tobacco.  The only exception to this is a small sip of water to take the medications listed above on the morning of surgery.  No alcoholic beverages for 24 hours prior to surgery.    You may brush your teeth but do not swallow the water.    Bring your eyeglasses and case with you.  No contacts are to be worn the day of surgery.  You also may bring your hearing aids.  Most surgical procedures involving anesthesia will require that you remove your dentures prior to surgery.    If you are on C-PAP or Bi-PAP at home and plan on staying in the hospital overnight for your surgery please bring the machine with you.    Do not wear any jewelry or body piercings day of surgery.  No nail polish on the operative extremity (arm/leg surgeries)    If you are staying overnight with us, please bring a small bag of necessary personal items.      Please wear loose, comfortable clothing.  If you are potentially going to have a cast or brace bring clothing that will fit over them.                                                                                                                          In case of illness - If you have cold or flu like symptoms (high fever, runny nose, sore throat, cough, etc.) rash, nausea, vomiting,

## 2025-04-11 LAB
EKG ATRIAL RATE: 50 BPM
EKG P AXIS: 71 DEGREES
EKG P-R INTERVAL: 136 MS
EKG Q-T INTERVAL: 436 MS
EKG QRS DURATION: 86 MS
EKG QTC CALCULATION (BAZETT): 397 MS
EKG R AXIS: 57 DEGREES
EKG T AXIS: 53 DEGREES
EKG VENTRICULAR RATE: 50 BPM

## 2025-04-21 ENCOUNTER — ANESTHESIA EVENT (OUTPATIENT)
Dept: OPERATING ROOM | Age: 45
End: 2025-04-21
Payer: COMMERCIAL

## 2025-04-25 ENCOUNTER — ANESTHESIA (OUTPATIENT)
Dept: OPERATING ROOM | Age: 45
End: 2025-04-25
Payer: COMMERCIAL

## 2025-04-25 ENCOUNTER — HOSPITAL ENCOUNTER (OUTPATIENT)
Age: 45
Setting detail: OUTPATIENT SURGERY
Discharge: HOME | End: 2025-04-25
Attending: ORAL & MAXILLOFACIAL SURGERY | Admitting: ORAL & MAXILLOFACIAL SURGERY
Payer: COMMERCIAL

## 2025-04-25 VITALS
OXYGEN SATURATION: 99 % | RESPIRATION RATE: 19 BRPM | SYSTOLIC BLOOD PRESSURE: 121 MMHG | TEMPERATURE: 97.9 F | HEART RATE: 50 BPM | DIASTOLIC BLOOD PRESSURE: 75 MMHG

## 2025-04-25 PROBLEM — K02.9 DENTAL CARIES: Status: ACTIVE | Noted: 2025-04-25

## 2025-04-25 PROCEDURE — 3700000001 HC ADD 15 MINUTES (ANESTHESIA): Performed by: ORAL & MAXILLOFACIAL SURGERY

## 2025-04-25 PROCEDURE — 2709999900 HC NON-CHARGEABLE SUPPLY: Performed by: ORAL & MAXILLOFACIAL SURGERY

## 2025-04-25 PROCEDURE — 2580000003 HC RX 258: Performed by: ANESTHESIOLOGY

## 2025-04-25 PROCEDURE — 6360000002 HC RX W HCPCS: Performed by: NURSE ANESTHETIST, CERTIFIED REGISTERED

## 2025-04-25 PROCEDURE — 6360000002 HC RX W HCPCS: Performed by: ORAL & MAXILLOFACIAL SURGERY

## 2025-04-25 PROCEDURE — 7100000010 HC PHASE II RECOVERY - FIRST 15 MIN: Performed by: ORAL & MAXILLOFACIAL SURGERY

## 2025-04-25 PROCEDURE — 3700000000 HC ANESTHESIA ATTENDED CARE: Performed by: ORAL & MAXILLOFACIAL SURGERY

## 2025-04-25 PROCEDURE — 6360000002 HC RX W HCPCS: Performed by: ANESTHESIOLOGY

## 2025-04-25 PROCEDURE — 2500000003 HC RX 250 WO HCPCS: Performed by: ANESTHESIOLOGY

## 2025-04-25 PROCEDURE — 7100000001 HC PACU RECOVERY - ADDTL 15 MIN: Performed by: ORAL & MAXILLOFACIAL SURGERY

## 2025-04-25 PROCEDURE — 7100000011 HC PHASE II RECOVERY - ADDTL 15 MIN: Performed by: ORAL & MAXILLOFACIAL SURGERY

## 2025-04-25 PROCEDURE — 7100000000 HC PACU RECOVERY - FIRST 15 MIN: Performed by: ORAL & MAXILLOFACIAL SURGERY

## 2025-04-25 PROCEDURE — 6370000000 HC RX 637 (ALT 250 FOR IP): Performed by: ANESTHESIOLOGY

## 2025-04-25 PROCEDURE — 3600000012 HC SURGERY LEVEL 2 ADDTL 15MIN: Performed by: ORAL & MAXILLOFACIAL SURGERY

## 2025-04-25 PROCEDURE — 2500000003 HC RX 250 WO HCPCS: Performed by: ORAL & MAXILLOFACIAL SURGERY

## 2025-04-25 PROCEDURE — 3600000002 HC SURGERY LEVEL 2 BASE: Performed by: ORAL & MAXILLOFACIAL SURGERY

## 2025-04-25 PROCEDURE — 2720000010 HC SURG SUPPLY STERILE: Performed by: ORAL & MAXILLOFACIAL SURGERY

## 2025-04-25 PROCEDURE — 2500000003 HC RX 250 WO HCPCS: Performed by: NURSE ANESTHETIST, CERTIFIED REGISTERED

## 2025-04-25 PROCEDURE — 2580000003 HC RX 258: Performed by: NURSE ANESTHETIST, CERTIFIED REGISTERED

## 2025-04-25 RX ORDER — PROPOFOL 10 MG/ML
INJECTION, EMULSION INTRAVENOUS
Status: DISCONTINUED | OUTPATIENT
Start: 2025-04-25 | End: 2025-04-25 | Stop reason: SDUPTHER

## 2025-04-25 RX ORDER — MIDAZOLAM HYDROCHLORIDE 1 MG/ML
INJECTION, SOLUTION INTRAMUSCULAR; INTRAVENOUS
Status: DISCONTINUED | OUTPATIENT
Start: 2025-04-25 | End: 2025-04-25 | Stop reason: SDUPTHER

## 2025-04-25 RX ORDER — FENTANYL CITRATE 50 UG/ML
25 INJECTION, SOLUTION INTRAMUSCULAR; INTRAVENOUS EVERY 5 MIN PRN
Status: COMPLETED | OUTPATIENT
Start: 2025-04-25 | End: 2025-04-25

## 2025-04-25 RX ORDER — SODIUM CHLORIDE 9 MG/ML
INJECTION, SOLUTION INTRAVENOUS PRN
Status: DISCONTINUED | OUTPATIENT
Start: 2025-04-25 | End: 2025-04-25 | Stop reason: HOSPADM

## 2025-04-25 RX ORDER — DIPHENHYDRAMINE HYDROCHLORIDE 50 MG/ML
12.5 INJECTION, SOLUTION INTRAMUSCULAR; INTRAVENOUS
Status: DISCONTINUED | OUTPATIENT
Start: 2025-04-25 | End: 2025-04-25 | Stop reason: HOSPADM

## 2025-04-25 RX ORDER — SODIUM CHLORIDE, SODIUM LACTATE, POTASSIUM CHLORIDE, CALCIUM CHLORIDE 600; 310; 30; 20 MG/100ML; MG/100ML; MG/100ML; MG/100ML
INJECTION, SOLUTION INTRAVENOUS CONTINUOUS
Status: DISCONTINUED | OUTPATIENT
Start: 2025-04-25 | End: 2025-04-25 | Stop reason: HOSPADM

## 2025-04-25 RX ORDER — PROCHLORPERAZINE EDISYLATE 5 MG/ML
10 INJECTION INTRAMUSCULAR; INTRAVENOUS
Status: DISCONTINUED | OUTPATIENT
Start: 2025-04-25 | End: 2025-04-25 | Stop reason: HOSPADM

## 2025-04-25 RX ORDER — BUPIVACAINE HYDROCHLORIDE AND EPINEPHRINE 5; 5 MG/ML; UG/ML
INJECTION, SOLUTION EPIDURAL; INTRACAUDAL; PERINEURAL PRN
Status: DISCONTINUED | OUTPATIENT
Start: 2025-04-25 | End: 2025-04-25 | Stop reason: ALTCHOICE

## 2025-04-25 RX ORDER — LIDOCAINE HYDROCHLORIDE 20 MG/ML
INJECTION, SOLUTION EPIDURAL; INFILTRATION; INTRACAUDAL; PERINEURAL
Status: DISCONTINUED | OUTPATIENT
Start: 2025-04-25 | End: 2025-04-25 | Stop reason: SDUPTHER

## 2025-04-25 RX ORDER — ONDANSETRON 2 MG/ML
INJECTION INTRAMUSCULAR; INTRAVENOUS
Status: DISCONTINUED | OUTPATIENT
Start: 2025-04-25 | End: 2025-04-25 | Stop reason: SDUPTHER

## 2025-04-25 RX ORDER — FENTANYL CITRATE 50 UG/ML
INJECTION, SOLUTION INTRAMUSCULAR; INTRAVENOUS
Status: DISCONTINUED | OUTPATIENT
Start: 2025-04-25 | End: 2025-04-25 | Stop reason: SDUPTHER

## 2025-04-25 RX ORDER — SODIUM CHLORIDE, SODIUM LACTATE, POTASSIUM CHLORIDE, CALCIUM CHLORIDE 600; 310; 30; 20 MG/100ML; MG/100ML; MG/100ML; MG/100ML
INJECTION, SOLUTION INTRAVENOUS
Status: DISCONTINUED | OUTPATIENT
Start: 2025-04-25 | End: 2025-04-25 | Stop reason: SDUPTHER

## 2025-04-25 RX ORDER — SODIUM CHLORIDE 0.9 % (FLUSH) 0.9 %
5-40 SYRINGE (ML) INJECTION PRN
Status: DISCONTINUED | OUTPATIENT
Start: 2025-04-25 | End: 2025-04-25 | Stop reason: HOSPADM

## 2025-04-25 RX ORDER — HYDROMORPHONE HYDROCHLORIDE 1 MG/ML
0.5 INJECTION, SOLUTION INTRAMUSCULAR; INTRAVENOUS; SUBCUTANEOUS EVERY 5 MIN PRN
Status: DISCONTINUED | OUTPATIENT
Start: 2025-04-25 | End: 2025-04-25 | Stop reason: HOSPADM

## 2025-04-25 RX ORDER — OXYMETAZOLINE HYDROCHLORIDE 0.05 G/100ML
2 SPRAY NASAL ONCE
Status: DISCONTINUED | OUTPATIENT
Start: 2025-04-25 | End: 2025-04-25 | Stop reason: HOSPADM

## 2025-04-25 RX ORDER — ROCURONIUM BROMIDE 10 MG/ML
INJECTION, SOLUTION INTRAVENOUS
Status: DISCONTINUED | OUTPATIENT
Start: 2025-04-25 | End: 2025-04-25 | Stop reason: SDUPTHER

## 2025-04-25 RX ORDER — METOCLOPRAMIDE HYDROCHLORIDE 5 MG/ML
10 INJECTION INTRAMUSCULAR; INTRAVENOUS
Status: DISCONTINUED | OUTPATIENT
Start: 2025-04-25 | End: 2025-04-25 | Stop reason: HOSPADM

## 2025-04-25 RX ORDER — NALOXONE HYDROCHLORIDE 0.4 MG/ML
INJECTION, SOLUTION INTRAMUSCULAR; INTRAVENOUS; SUBCUTANEOUS PRN
Status: DISCONTINUED | OUTPATIENT
Start: 2025-04-25 | End: 2025-04-25 | Stop reason: HOSPADM

## 2025-04-25 RX ORDER — LIDOCAINE HYDROCHLORIDE 10 MG/ML
1 INJECTION, SOLUTION EPIDURAL; INFILTRATION; INTRACAUDAL; PERINEURAL
Status: DISCONTINUED | OUTPATIENT
Start: 2025-04-26 | End: 2025-04-25 | Stop reason: HOSPADM

## 2025-04-25 RX ORDER — CEFAZOLIN SODIUM/WATER 2 G/20 ML
2000 SYRINGE (ML) INTRAVENOUS ONCE
Status: COMPLETED | OUTPATIENT
Start: 2025-04-25 | End: 2025-04-25

## 2025-04-25 RX ORDER — SODIUM CHLORIDE 0.9 % (FLUSH) 0.9 %
5-40 SYRINGE (ML) INJECTION EVERY 12 HOURS SCHEDULED
Status: DISCONTINUED | OUTPATIENT
Start: 2025-04-25 | End: 2025-04-25 | Stop reason: HOSPADM

## 2025-04-25 RX ORDER — OXYCODONE HYDROCHLORIDE 5 MG/1
5 TABLET ORAL
Status: COMPLETED | OUTPATIENT
Start: 2025-04-25 | End: 2025-04-25

## 2025-04-25 RX ORDER — DEXAMETHASONE SODIUM PHOSPHATE 10 MG/ML
INJECTION, SOLUTION INTRAMUSCULAR; INTRAVENOUS
Status: DISCONTINUED | OUTPATIENT
Start: 2025-04-25 | End: 2025-04-25 | Stop reason: SDUPTHER

## 2025-04-25 RX ORDER — SODIUM CHLORIDE 9 MG/ML
INJECTION, SOLUTION INTRAVENOUS CONTINUOUS
Status: DISCONTINUED | OUTPATIENT
Start: 2025-04-25 | End: 2025-04-25 | Stop reason: HOSPADM

## 2025-04-25 RX ORDER — MEPERIDINE HYDROCHLORIDE 50 MG/ML
12.5 INJECTION INTRAMUSCULAR; INTRAVENOUS; SUBCUTANEOUS EVERY 5 MIN PRN
Status: DISCONTINUED | OUTPATIENT
Start: 2025-04-25 | End: 2025-04-25 | Stop reason: HOSPADM

## 2025-04-25 RX ADMIN — HYDROMORPHONE HYDROCHLORIDE 0.5 MG: 1 INJECTION, SOLUTION INTRAMUSCULAR; INTRAVENOUS; SUBCUTANEOUS at 16:40

## 2025-04-25 RX ADMIN — FENTANYL CITRATE 50 MCG: 50 INJECTION INTRAMUSCULAR; INTRAVENOUS at 14:02

## 2025-04-25 RX ADMIN — FENTANYL CITRATE 25 MCG: 50 INJECTION INTRAMUSCULAR; INTRAVENOUS at 17:28

## 2025-04-25 RX ADMIN — DEXAMETHASONE SODIUM PHOSPHATE 10 MG: 10 INJECTION, SOLUTION INTRAMUSCULAR; INTRAVENOUS at 14:00

## 2025-04-25 RX ADMIN — FENTANYL CITRATE 50 MCG: 50 INJECTION INTRAMUSCULAR; INTRAVENOUS at 13:53

## 2025-04-25 RX ADMIN — MIDAZOLAM 2 MG: 1 INJECTION INTRAMUSCULAR; INTRAVENOUS at 13:44

## 2025-04-25 RX ADMIN — LIDOCAINE HYDROCHLORIDE 60 MG: 20 INJECTION, SOLUTION EPIDURAL; INFILTRATION; INTRACAUDAL; PERINEURAL at 13:53

## 2025-04-25 RX ADMIN — SODIUM CHLORIDE, SODIUM LACTATE, POTASSIUM CHLORIDE, AND CALCIUM CHLORIDE: .6; .31; .03; .02 INJECTION, SOLUTION INTRAVENOUS at 11:58

## 2025-04-25 RX ADMIN — FENTANYL CITRATE 25 MCG: 50 INJECTION INTRAMUSCULAR; INTRAVENOUS at 17:12

## 2025-04-25 RX ADMIN — FENTANYL CITRATE 50 MCG: 50 INJECTION INTRAMUSCULAR; INTRAVENOUS at 15:12

## 2025-04-25 RX ADMIN — ONDANSETRON 4 MG: 2 INJECTION, SOLUTION INTRAMUSCULAR; INTRAVENOUS at 15:18

## 2025-04-25 RX ADMIN — SUGAMMADEX 200 MG: 100 INJECTION, SOLUTION INTRAVENOUS at 16:01

## 2025-04-25 RX ADMIN — OXYCODONE 5 MG: 5 TABLET ORAL at 17:38

## 2025-04-25 RX ADMIN — FENTANYL CITRATE 50 MCG: 50 INJECTION INTRAMUSCULAR; INTRAVENOUS at 16:11

## 2025-04-25 RX ADMIN — FENTANYL CITRATE 25 MCG: 50 INJECTION INTRAMUSCULAR; INTRAVENOUS at 16:54

## 2025-04-25 RX ADMIN — Medication 2000 MG: at 14:03

## 2025-04-25 RX ADMIN — SODIUM CHLORIDE, POTASSIUM CHLORIDE, SODIUM LACTATE AND CALCIUM CHLORIDE: 600; 310; 30; 20 INJECTION, SOLUTION INTRAVENOUS at 14:09

## 2025-04-25 RX ADMIN — SODIUM CHLORIDE, POTASSIUM CHLORIDE, SODIUM LACTATE AND CALCIUM CHLORIDE: 600; 310; 30; 20 INJECTION, SOLUTION INTRAVENOUS at 13:44

## 2025-04-25 RX ADMIN — HYDROMORPHONE HYDROCHLORIDE 0.5 MG: 1 INJECTION, SOLUTION INTRAMUSCULAR; INTRAVENOUS; SUBCUTANEOUS at 16:46

## 2025-04-25 RX ADMIN — ROCURONIUM BROMIDE 50 MG: 10 INJECTION, SOLUTION INTRAVENOUS at 13:53

## 2025-04-25 RX ADMIN — PROPOFOL 200 MG: 10 INJECTION, EMULSION INTRAVENOUS at 13:53

## 2025-04-25 RX ADMIN — FENTANYL CITRATE 25 MCG: 50 INJECTION INTRAMUSCULAR; INTRAVENOUS at 17:19

## 2025-04-25 ASSESSMENT — PAIN DESCRIPTION - LOCATION
LOCATION: MOUTH
LOCATION: MOUTH;JAW
LOCATION: MOUTH

## 2025-04-25 ASSESSMENT — PAIN DESCRIPTION - ORIENTATION
ORIENTATION: UPPER;LOWER
ORIENTATION: LOWER;UPPER
ORIENTATION: LOWER;UPPER

## 2025-04-25 ASSESSMENT — PAIN DESCRIPTION - DESCRIPTORS
DESCRIPTORS: ACHING;SORE
DESCRIPTORS: ACHING;SORE
DESCRIPTORS: SORE;ACHING

## 2025-04-25 ASSESSMENT — PAIN SCALES - GENERAL
PAINLEVEL_OUTOF10: 5
PAINLEVEL_OUTOF10: 10
PAINLEVEL_OUTOF10: 10

## 2025-04-25 NOTE — ANESTHESIA PRE PROCEDURE
Department of Anesthesiology  Preprocedure Note       Name:  Meek Shi   Age:  45 y.o.  :  1980                                          MRN:  [id number]         Date:  2025      Surgeon: Surgeon(s):  Leslie Hughes DDS    Procedure: Procedure(s):  DENTAL EXTRACTION FULL    Medications prior to admission:   Prior to Admission medications    Not on File       Current medications:    Current Facility-Administered Medications   Medication Dose Route Frequency Provider Last Rate Last Admin    [START ON 2025] lidocaine PF 1 % injection 1 mL  1 mL IntraDERmal Once PRN Julia Gar MD        0.9 % sodium chloride infusion   IntraVENous Continuous Julia Gar MD        lactated ringers infusion   IntraVENous Continuous Julia Gar  mL/hr at 25 1158 New Bag at 25 1158    sodium chloride flush 0.9 % injection 5-40 mL  5-40 mL IntraVENous 2 times per day Julia Gar MD        sodium chloride flush 0.9 % injection 5-40 mL  5-40 mL IntraVENous PRN Julia Gar MD        0.9 % sodium chloride infusion   IntraVENous PRN Julia Gar MD        oxymetazoline (AFRIN) 0.05 % nasal spray 2 spray  2 spray Each Nostril Once Callum Cary,          Facility-Administered Medications Ordered in Other Encounters   Medication Dose Route Frequency Provider Last Rate Last Admin    fentaNYL (SUBLIMAZE) injection   IntraVENous Once PRN Dora, Tabby R, APRN - CRNA   50 mcg at 25 1402    rocuronium (ZEMURON) injection   IntraVENous Once PRN Dora, Tabby R, APRN - CRNA   50 mg at 25 1353    midazolam (VERSED) injection   IntraVENous Once PRN Dora, Tabby R, APRN - CRNA   2 mg at 25 1344    dexAMETHasone (PF) (DECADRON) injection   IntraVENous Once PRN Dora, Tabby R, APRN - CRNA   10 mg at 25 1400    propofol infusion   IntraVENous Once PRN Dora, Tabby R, APRN - CRNA   200 mg at 25 1353

## 2025-04-25 NOTE — ANESTHESIA POSTPROCEDURE EVALUATION
Department of Anesthesiology  Postprocedure Note    Patient: Meek Shi  MRN: [id number]  YOB: 1980  Date of evaluation: 4/25/2025    Procedure Summary       Date: 04/25/25 Room / Location: [address]    Anesthesia Start: 1344 Anesthesia Stop: 1616    Procedure: DENTAL EXTRACTION FULL (Mouth) Diagnosis:       Dental caries      (Dental caries [K02.9])    Surgeons: Leslie Hughes DDS Responsible Provider: Callum Cary DO    Anesthesia Type: General ASA Status: 3            Anesthesia Type: General    Clement Phase I: Clement Score: 10    Clement Phase II: Clement Score: 10    Anesthesia Post Evaluation    Patient location during evaluation: PACU  Patient participation: complete - patient participated  Level of consciousness: awake and alert  Airway patency: patent  Nausea & Vomiting: no nausea and no vomiting  Cardiovascular status: hemodynamically stable  Respiratory status: acceptable  Hydration status: stable  Pain management: adequate    No notable events documented.

## 2025-04-25 NOTE — H&P
History and Physical Service   [facility]    HISTORY AND PHYSICAL EXAMINATION            Date of Evaluation: 4/25/2025  Patient name:  Meek Shi  MRN:   [id number]  YOB: 1980  PCP:    Macey Mcpherson DO    History Obtained From:     Patient, medical records    History of Present Illness:     This is Meek Shi a 45 y.o. male who presents today for a DENTAL EXTRACTION FULL by Leslie Hughes DDS for Dental caries. PATIENT HAS HAD ONGOING DENTAL PROBLEMS FOR MANY YEARS.PRESENTS FOR DENTAL EXTRACTIONS.       Denies fever, chills, shortness of breath, cough, congestion, wheezing, chest pain, open sores or wounds.HE DOES NOT TAKE BLOOD THINNERS.HE DOES NOT HAVE DIABETES.SMOKED MARIJUANA YESTERDAY.    Past Medical History:     Past Medical History:   Diagnosis Date    Abscess, mastoid, subperiosteal, right 10/2023    ADHD     As a kid    Asthma     Childhood.  Denies difficulty since then.  No current treatment.    Bipolar disorder (HCC)     \"Diagnosed when I was younger but I worked through it.\"    Bradycardia 10/2022    ER visit . Asymptomatic.    COVID-19 10/01/2022    History of MRSA infection     Lumbar sprain 12/07/2023    ER visit    Major depression     not a problem    Metal foreign body in eye region     Orbital cellulitis on right 10/2023    Poor dentition 01/12/2024    States that dentist said that he needs all teeth removed.    Spontaneous pneumothorax     Left    Under care of team     PCP - Dr. Mcpherson - last visit 12/15/2023    Under care of team     Infectious Disease - Dr. Arora - last visit 11/8/2023; is being seen for chronic sinus infection has next appointment 5/14/25    Under care of team     Otolaryngology - Dr. Baker - last visit 12/19/2023    Under care of team     Cardiology - New pt appt 1/24/2024 - same day as surgery; last saw Dr Noriega 6/14/24        Past Surgical History:     Past Surgical History:   Procedure Laterality Date    HAND

## 2025-04-25 NOTE — BRIEF OP NOTE
Brief Postoperative Note      Patient: Meek Shi  YOB: 1980  MRN: [id number]    Date of Procedure: 4/25/2025    Pre-Op Diagnosis Codes:      * Dental caries [K02.9]    Post-Op Diagnosis: Same       Procedure(s):  DENTAL EXTRACTION FULL    Surgeon(s):  Leslie Hughes DDS    Assistant:  First Assistant: Shyann Corey RN    Anesthesia: General    Estimated Blood Loss (mL): less than 50     Complications: None    Specimens:   * No specimens in log *    Implants:  * No implants in log *      Drains: * No LDAs found *    Findings:  Infection Present At Time Of Surgery (PATOS) (choose all levels that have infection present):  No infection present  Other Findings: rampant dental caries    Electronically signed by Leslie Hughes DDS on 4/25/2025 at 4:23 PM

## 2025-04-25 NOTE — DISCHARGE INSTRUCTIONS
Soft diet for next week at least  Swelling will peak in 2-3 days (Sunday/Monday)  Ice packs to face for first 24-48 hours will help with swelling  Keep head elevated will help with swelling.  Stitches are dissolvable and will fall out on their own  If bleeding, fold 1-2 4X4 gauze pads and place over socket/gums and bite very firmly. Leave gauze in place for at least 20-30 minutes. Do not take gauze out too soon (I.e. 5-10 minutes) because it will dislodge early soft clot and cause it to bleed again. Avoid rinsing, swishing, spitting for 24 hours as this will also increase bleeding.

## 2025-04-25 NOTE — OP NOTE
Operative Note      Patient: Meek Shi  YOB: 1980  MRN: [id number]    Date of Procedure: 4/25/2025    Pre-Op Diagnosis Codes:      * Dental caries [K02.9]    Post-Op Diagnosis: Same       Procedure(s):  DENTAL EXTRACTION FULL    Surgeon(s):  Leslie Hughes DDS    Assistant:   First Assistant: Shyann Corey RN    Anesthesia: General    Estimated Blood Loss (mL): less than 50     Complications: None    Specimens:   * No specimens in log *    Implants:  * No implants in log *      Drains: * No LDAs found *    Findings:  Infection Present At Time Of Surgery (PATOS) (choose all levels that have infection present):  No infection present  Other Findings: bilateral mandibular vivian, buccal exostoses in mandible and maxilla    Detailed Description of Procedure:   Patient brought to OR awake and alert in care of nursing/anesthesia teams. Transferred to OR table in supine position. All standard ASA monitors applied. General anesthesia induced and an oral endotracheal tube placed without difficulty. Patient with history of extensive nasal surgery and nasal stents therefore anesthesia did not want to place a nasal tube. Correct ET placement was verified via auscultation and end-tidal CO2 and tube was taped and secured throughout case. Eyes taped for protection. Upper extremities padded and tucked. Patient was sterilely draped.    Local anesthesia was given: 8 X 1.8mL dental cartridges of 0.5% bupivacaine with 1/200,000 epinephrine via bilateral inferior alveolar nerve block and local infiltration. Sufficient time was allowed for the local to take effect.    Attention directed to patient's right side first.  Teeth #8, 9, 10, 11, 12, 13, 14, 15 elevator/forceps extraction; small sinus opening in socket #14 (about 5-6mm); gelfoam placed and buccal flap mobilized, closed with multiple 3-0 chromic gut sutures; crestal and buccal bone was smoothed with rongeur and surgical handpiece/fissured bur, copious

## 2025-05-06 ENCOUNTER — OFFICE VISIT (OUTPATIENT)
Age: 45
End: 2025-05-06

## 2025-05-06 DIAGNOSIS — M77.01 MEDIAL EPICONDYLITIS OF RIGHT ELBOW: Primary | ICD-10-CM

## 2025-05-06 RX ADMIN — LIDOCAINE HYDROCHLORIDE 0.5 ML: 10 INJECTION, SOLUTION INFILTRATION; PERINEURAL at 13:29

## 2025-05-06 RX ADMIN — METHYLPREDNISOLONE ACETATE 40 MG: 80 INJECTION, SUSPENSION INTRA-ARTICULAR; INTRALESIONAL; INTRAMUSCULAR; SOFT TISSUE at 13:29

## 2025-05-07 RX ORDER — LIDOCAINE HYDROCHLORIDE 10 MG/ML
0.5 INJECTION, SOLUTION INFILTRATION; PERINEURAL ONCE
Status: COMPLETED | OUTPATIENT
Start: 2025-05-07 | End: 2025-05-06

## 2025-05-07 RX ORDER — METHYLPREDNISOLONE ACETATE 80 MG/ML
40 INJECTION, SUSPENSION INTRA-ARTICULAR; INTRALESIONAL; INTRAMUSCULAR; SOFT TISSUE ONCE
Status: COMPLETED | OUTPATIENT
Start: 2025-05-07 | End: 2025-05-06

## 2025-05-22 ENCOUNTER — OFFICE VISIT (OUTPATIENT)
Dept: INFECTIOUS DISEASES | Age: 45
End: 2025-05-22
Payer: COMMERCIAL

## 2025-05-22 VITALS
SYSTOLIC BLOOD PRESSURE: 109 MMHG | BODY MASS INDEX: 24.77 KG/M2 | OXYGEN SATURATION: 100 % | HEIGHT: 74 IN | WEIGHT: 193 LBS | TEMPERATURE: 97 F | DIASTOLIC BLOOD PRESSURE: 64 MMHG | HEART RATE: 52 BPM

## 2025-05-22 DIAGNOSIS — J32.2 CHRONIC ETHMOIDAL SINUSITIS: ICD-10-CM

## 2025-05-22 DIAGNOSIS — J32.0 CHRONIC MAXILLARY SINUSITIS: ICD-10-CM

## 2025-05-22 DIAGNOSIS — J32.1 CHRONIC FRONTAL SINUSITIS: Primary | ICD-10-CM

## 2025-05-22 PROCEDURE — 99213 OFFICE O/P EST LOW 20 MIN: CPT | Performed by: INTERNAL MEDICINE

## 2025-05-22 ASSESSMENT — ENCOUNTER SYMPTOMS
GASTROINTESTINAL NEGATIVE: 1
RESPIRATORY NEGATIVE: 1

## 2025-05-22 NOTE — PROGRESS NOTES
05/28/2024 12:08 PM       Lab Results   Component Value Date/Time    CRP <3.0 10/02/2022 07:32 AM     No results found for: \"SEDRATE\"      Imaging Studies:   No new imaging    Cultures:   Culture and Sensitivities:  No results for input(s): \"SPECDESC\", \"SPECIAL\", \"CULTURE\", \"STATUS\", \"ORG\", \"CDIFFTOXPCR\", \"CAMPYLOBPCR\", \"SALMONELLAPC\", \"SHIGAPCR\", \"SHIGELLAPCR\" in the last 72 hours.      Thank you for allowing us to participate in the care of this patient. Please call with questions.    Electronically signed by Kwesi Arora MD on 5/22/2025 at 10:04 AM      Kwesi Arora MD  Perfect Serve messaging: [phone]    The patient (or guardian, if applicable) and other individuals in attendance with the patient were advised that Artificial Intelligence will be utilized during this visit to record, process the conversation to generate a clinical note, and support improvement of the AI technology. The patient (or guardian, if applicable) and other individuals in attendance at the appointment consented to the use of AI, including the recording.        This note is created with the assistance of a speech recognition program.  While intending to generate a document that actually reflects the content ofthe visit, the document can still have some errors including those of syntax and sound a like substitutions which may escape proof reading.  It such instances, actual meaning can be extrapolated by contextual diversion.

## 2025-07-01 ENCOUNTER — OFFICE VISIT (OUTPATIENT)
Age: 45
End: 2025-07-01
Payer: COMMERCIAL

## 2025-07-01 DIAGNOSIS — M77.01 MEDIAL EPICONDYLITIS OF RIGHT ELBOW: Primary | ICD-10-CM

## 2025-07-01 PROCEDURE — 99213 OFFICE O/P EST LOW 20 MIN: CPT | Performed by: NURSE PRACTITIONER

## 2025-07-01 NOTE — PROGRESS NOTES
[facility]      [facility]YS[facility] ORTHOPAEDICS AND SPORTS MEDICINE  [address] [place] [address] [address]  [place] [address]  Dept: [phone]  Dept Fax: [phone]    Chief Compliant:  Chief Complaint   Patient presents with    Elbow Pain     Right elbow        History of Present Illness:  7/1/25:This is a pleasant 45 y.o. male who is here for reevaluation of right medial elbow pain.  I previously diagnosed him with medial epicondylitis.  He received a cortisone injection on May 6, 2025 and states this provided near complete relief of his symptoms up until a few days ago.  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  He states now that he is resting he does not have much pain but he knows as soon as he goes to lift something heavy he is going to have pain in the elbow.  He denies any numbness or tingling.  He denies any significant injury or fall.  He is not taking anything for pain.    Physical Exam: Right elbow: Skin intact. Prominent medial epicondyle, equivalent to contralateral side. TTP over the medial epicondyle. Pain with resisted pronation and resisted wrist flexion. Good range of motion of the elbow, wrist and digits.  Negative Tinel's negative Durkan's at the cubital tunnel.    Imaging: None today      Assessment and Plan:    This is a pleasant 45 y.o. male who has ongoing right elbow medial epicondylitis.  I discussed with his line of work, this can be a very difficult thing to treat.  Since he is not due for another injection for another 6 weeks, I recommend that he goes to physical therapy.  Order will be placed.  Discussed if he continues to have pain 4 weeks from now, he can come back for repeat injection.  Patient may follow-up as needed.         Past History:  No current outpatient medications on file.  No Known

## 2025-07-15 ENCOUNTER — HOSPITAL ENCOUNTER (OUTPATIENT)
Facility: CLINIC | Age: 45
Setting detail: THERAPIES SERIES
Discharge: HOME | End: 2025-07-15
Payer: COMMERCIAL

## 2025-07-15 PROCEDURE — 97110 THERAPEUTIC EXERCISES: CPT

## 2025-07-15 PROCEDURE — 97165 OT EVAL LOW COMPLEX 30 MIN: CPT

## 2025-07-15 NOTE — THERAPY EVALUATION
[] [facility]  Outpatient Rehabilitation &  Therapy  [address]  [phone]  F: [phone] [x] [facility]  Outpatient Rehabilitation &  Therapy  [address]   [address]  P: [phone]  F: [phone] [] [facility]  Outpatient Rehabilitation &  Therapy  [address]  P: [phone]  F: [phone] [] [facility]  Outpatient Rehabilitation &  Therapy  [address]   P: [phone]  F: [phone] [] [facility]   Outpatient Rehabilitation   & Therapy  [address]  P: [phone]   F: [phone]       Occupational Therapy Hand & Upper Extremity  Initial Evaluation    Date: 7/15/2025      Patient: Meek Shi  : 1980  MRN: [id number]  Referring Provider:  Tito Griffin MD    Insurance: [facility]   Medical Diagnosis: M77.01 (ICD-10-CM) - Medial epicondylitis of right elbow   Rehab Codes: pain in right hand M79.641 and pain in right elbow M25.521  Onset Date: 25    Next  Appt: in 4 weeks for another injection PRN     Insurance/Authorization as of Initial Eval:   Visit Limit: 30   Visits Remainin   Hard Max?       []  yes      [x]  no   Are visits combined? NO   Authorization Required?   [] yes     [x]  no      Subjective:   Current Complaints:   Pt fell off his truck   He had a cortisone shot on 25. The pain relief only lasted about 6 weeks, and then it started hurting pretty bad again.     Pt was also in therapy at the beginning of this year due to triggering of the index and middle finger. Symptoms had resolved with splitting and therapy, but he state the finger has started acting up again. He can still open and close but it occ locks up.         Mechanism of Injury: trauma    Surgery: []  Yes [x]  No    Precautions: WBAT     Involved Extremity: Right     Dominant Extremity: left handed     Orthosis: has finger splints for right index. He

## 2025-07-31 ENCOUNTER — HOSPITAL ENCOUNTER (OUTPATIENT)
Facility: CLINIC | Age: 45
Setting detail: THERAPIES SERIES
Discharge: HOME | End: 2025-07-31
Payer: COMMERCIAL

## 2025-07-31 PROCEDURE — 97110 THERAPEUTIC EXERCISES: CPT

## 2025-07-31 NOTE — FLOWSHEET NOTE
[facility]  Outpatient Rehabilitation &  Therapy  [address]     [phone]  F: [phone]   [x] [facility]  Outpatient Rehabilitation &  Therapy  [address]   [address]  P: [phone]  F: [phone] [] [facility]  Outpatient Rehabilitation &  Therapy  [address]  P: [phone]  F: [phone]  [] [facility]   Outpatient Rehabilitation & Therapy  [address]  P: [phone]   F: [phone]     Occupational Therapy Daily Treatment Note    Date:  2025  Patient Name:  Meek Shi    :  1980  MRN: [id number]  Referring Provider:  Tito Griffin MD     Insurance: [facility]   Medical Diagnosis: M77.01 (ICD-10-CM) - Medial epicondylitis of right elbow   Rehab Codes: pain in right hand M79.641 and pain in right elbow M25.521  Onset Date: 25     Next  Appt: in 4 weeks for another injection PRN      Insurance/Authorization as of Initial Eval:   Visit Limit: 30   Visits Remainin   Hard Max?       []  yes      [x]  no   Are visits combined? NO   Authorization Required?   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   He hasn't worked as hard the last couple days, but the elbow still hurts.   He has not done the friction massage because it slipped his mind.   He has not bought the counterforce brace because he is short on cash.         Pain:  [x] Yes  [] No   Location: elbow   Pain Ratin/10 (0-10 scale)  Pain altered Tx:  [x] No  [] Yes    Action: NA       Objective:    ROM/STRENGTH:     Date of Measurements  7/15/25 Rt          Elbow ext/flex   WFL    Wrist ext/flex    pain with resisted wrist flex    Digit flex from the DPC (cm)  there is palpable popping/crepitation at

## 2025-08-07 ENCOUNTER — HOSPITAL ENCOUNTER (OUTPATIENT)
Facility: CLINIC | Age: 45
Setting detail: THERAPIES SERIES
Discharge: HOME | End: 2025-08-07
Payer: COMMERCIAL

## 2025-08-07 PROCEDURE — 97110 THERAPEUTIC EXERCISES: CPT

## 2025-08-14 ENCOUNTER — HOSPITAL ENCOUNTER (OUTPATIENT)
Facility: CLINIC | Age: 45
Setting detail: THERAPIES SERIES
Discharge: HOME | End: 2025-08-14
Payer: COMMERCIAL

## 2025-08-14 PROCEDURE — 97110 THERAPEUTIC EXERCISES: CPT

## 2025-08-14 PROCEDURE — 97760 ORTHOTIC MGMT&TRAING 1ST ENC: CPT

## 2025-08-25 ENCOUNTER — HOSPITAL ENCOUNTER (OUTPATIENT)
Facility: CLINIC | Age: 45
Setting detail: THERAPIES SERIES
Discharge: HOME | End: 2025-08-25
Payer: COMMERCIAL

## 2025-08-25 PROCEDURE — 97110 THERAPEUTIC EXERCISES: CPT

## (undated) DEVICE — BLADE,CARBON-STEEL,15,STRL,DISPOSABLE,TB: Brand: MEDLINE

## (undated) DEVICE — DRAPE,EENT,SPLIT,STERILE: Brand: MEDLINE

## (undated) DEVICE — ADHESIVE PAD 9732500XOM 25PK ENT

## (undated) DEVICE — STRAP,POSITIONING,KNEE/BODY,FOAM,4X60": Brand: MEDLINE

## (undated) DEVICE — SUTURE VICRYL RAPIDE SZ 3-0 L18IN ABSRB UD PS-2 L19MM 3/8 CIR VR497

## (undated) DEVICE — BLADE 1884380EM QUADCUT 4.3MMX13CM ROHS: Brand: ROTATABLE FUSION®

## (undated) DEVICE — MARKER,SKIN,WI/RULER AND LABELS: Brand: MEDLINE

## (undated) DEVICE — SVMMC NSL PK

## (undated) DEVICE — SYRINGE,EAR/ULCER, 2 OZ, STERILE: Brand: MEDLINE

## (undated) DEVICE — STAZ MINOR BASIN: Brand: MEDLINE INDUSTRIES, INC.

## (undated) DEVICE — BLADE 1884016HR RAD60 5PK M4 4MM ROTATE: Brand: RAD

## (undated) DEVICE — GAUZE,SPONGE,4"X4",16PLY,XRAY,STRL,LF: Brand: MEDLINE

## (undated) DEVICE — CONTAINER,SPECIMEN,OR STERILE,4OZ: Brand: MEDLINE

## (undated) DEVICE — GLOVE ORANGE PI 8   MSG9080

## (undated) DEVICE — STRAP ARMBRD W1.5XL32IN FOAM STR YET SFT W/ HK AND LOOP

## (undated) DEVICE — COVER LT HNDL BLU PLAS

## (undated) DEVICE — POSITIONER HD W8XH4XL8.5IN RASPBERRY FOAM SLT

## (undated) DEVICE — AGENT HEMOSTATIC SURGIFLOW MATRIX KIT W/THROMBIN

## (undated) DEVICE — TUBING 1895522 5PK STRAIGHTSHOT TO XPS: Brand: STRAIGHTSHOT®

## (undated) DEVICE — SHEATH 1912000 5PK 4MM/0DEG STORZ XOMED: Brand: ENDO-SCRUB®

## (undated) DEVICE — SPLINT NSL W0.6XL2IN INTNSL CHITOSAN POLYMER HYDRATED

## (undated) DEVICE — TOWEL,OR,DSP,ST,BLUE,DLX,XR,4/PK,20PK/CS: Brand: MEDLINE

## (undated) DEVICE — INSTRUMENT TRACKER 9733533XOM ENT 1PK

## (undated) DEVICE — CARBIDE ROUND

## (undated) DEVICE — GOWN,AURORA,NONREINFORCED,LARGE: Brand: MEDLINE

## (undated) DEVICE — DECANTER BAG 9": Brand: MEDLINE INDUSTRIES, INC.

## (undated) DEVICE — TRAP,MUCUS SPECIMEN, 80CC: Brand: MEDLINE

## (undated) DEVICE — SOLUTION IRRIG 3000ML 0.9% SOD CHL USP UROMATIC PLAS CONT

## (undated) DEVICE — Device: Brand: NIPRO HYPODERMIC NEEDLE

## (undated) DEVICE — NDL CNTR 40CT FM MAG: Brand: MEDLINE INDUSTRIES, INC.

## (undated) DEVICE — PAD,NON-ADHERENT,3X8,STERILE,LF,1/PK: Brand: MEDLINE

## (undated) DEVICE — PATIENT TRACKER 9733534XOM ENT 1PK

## (undated) DEVICE — SHEATH 1912020 5PK 4MM/70DEG STORZ LINV: Brand: ENDO-SCRUB®

## (undated) DEVICE — GARMENT,MEDLINE,DVT,INT,CALF,MED, GEN2: Brand: MEDLINE

## (undated) DEVICE — SYRINGE IRRIG 60ML SFT PLIABLE BLB EZ TO GRP 1 HND USE W/

## (undated) DEVICE — Device

## (undated) DEVICE — 3.0MM ROUND SOLID CARBIDE BUR MEDIUM

## (undated) DEVICE — PATIENT TRACKER  9733534 EM ENT 1PK

## (undated) DEVICE — GLOVE SURG SZ 65 CRM LTX FREE POLYISOPRENE POLYMER BEAD ANTI

## (undated) DEVICE — TUBING, SUCTION, 9/32" X 20', STRAIGHT: Brand: MEDLINE INDUSTRIES, INC.

## (undated) DEVICE — ELECTRODE PT RET AD L9FT HI MOIST COND ADH HYDRGEL CORDED